# Patient Record
Sex: FEMALE | Race: WHITE | HISPANIC OR LATINO | Employment: OTHER | URBAN - METROPOLITAN AREA
[De-identification: names, ages, dates, MRNs, and addresses within clinical notes are randomized per-mention and may not be internally consistent; named-entity substitution may affect disease eponyms.]

---

## 2017-01-04 ENCOUNTER — ALLSCRIPTS OFFICE VISIT (OUTPATIENT)
Dept: OTHER | Facility: OTHER | Age: 77
End: 2017-01-04

## 2017-01-04 DIAGNOSIS — R27.0 ATAXIA: ICD-10-CM

## 2017-01-04 DIAGNOSIS — E78.2 MIXED HYPERLIPIDEMIA: ICD-10-CM

## 2017-01-04 DIAGNOSIS — G62.0 DRUG-INDUCED POLYNEUROPATHY (HCC): ICD-10-CM

## 2017-01-06 ENCOUNTER — ALLSCRIPTS OFFICE VISIT (OUTPATIENT)
Dept: OTHER | Facility: OTHER | Age: 77
End: 2017-01-06

## 2017-01-11 ENCOUNTER — APPOINTMENT (OUTPATIENT)
Dept: LAB | Facility: CLINIC | Age: 77
End: 2017-01-11
Payer: COMMERCIAL

## 2017-01-11 DIAGNOSIS — R27.0 ATAXIA: ICD-10-CM

## 2017-01-11 DIAGNOSIS — G62.0 DRUG-INDUCED POLYNEUROPATHY (HCC): ICD-10-CM

## 2017-01-11 DIAGNOSIS — E78.2 MIXED HYPERLIPIDEMIA: ICD-10-CM

## 2017-01-11 LAB
ALBUMIN SERPL BCP-MCNC: 3.8 G/DL (ref 3.5–5)
ALP SERPL-CCNC: 70 U/L (ref 46–116)
ALT SERPL W P-5'-P-CCNC: 30 U/L (ref 12–78)
ANION GAP SERPL CALCULATED.3IONS-SCNC: 8 MMOL/L (ref 4–13)
AST SERPL W P-5'-P-CCNC: 18 U/L (ref 5–45)
BASOPHILS # BLD AUTO: 0.02 THOUSANDS/ΜL (ref 0–0.1)
BASOPHILS NFR BLD AUTO: 0 % (ref 0–1)
BILIRUB SERPL-MCNC: 0.57 MG/DL (ref 0.2–1)
BUN SERPL-MCNC: 15 MG/DL (ref 5–25)
CALCIUM SERPL-MCNC: 9.1 MG/DL (ref 8.3–10.1)
CHLORIDE SERPL-SCNC: 102 MMOL/L (ref 100–108)
CO2 SERPL-SCNC: 28 MMOL/L (ref 21–32)
CREAT SERPL-MCNC: 0.7 MG/DL (ref 0.6–1.3)
EOSINOPHIL # BLD AUTO: 0.1 THOUSAND/ΜL (ref 0–0.61)
EOSINOPHIL NFR BLD AUTO: 2 % (ref 0–6)
ERYTHROCYTE [DISTWIDTH] IN BLOOD BY AUTOMATED COUNT: 13.4 % (ref 11.6–15.1)
EST. AVERAGE GLUCOSE BLD GHB EST-MCNC: 114 MG/DL
FOLATE SERPL-MCNC: 19.7 NG/ML (ref 3.1–17.5)
GFR SERPL CREATININE-BSD FRML MDRD: >60 ML/MIN/1.73SQ M
GLUCOSE SERPL-MCNC: 136 MG/DL (ref 65–140)
HBA1C MFR BLD: 5.6 % (ref 4.2–6.3)
HCT VFR BLD AUTO: 40.2 % (ref 34.8–46.1)
HGB BLD-MCNC: 13.8 G/DL (ref 11.5–15.4)
LYMPHOCYTES # BLD AUTO: 1.64 THOUSANDS/ΜL (ref 0.6–4.47)
LYMPHOCYTES NFR BLD AUTO: 35 % (ref 14–44)
MCH RBC QN AUTO: 31.7 PG (ref 26.8–34.3)
MCHC RBC AUTO-ENTMCNC: 34.3 G/DL (ref 31.4–37.4)
MCV RBC AUTO: 92 FL (ref 82–98)
MONOCYTES # BLD AUTO: 0.23 THOUSAND/ΜL (ref 0.17–1.22)
MONOCYTES NFR BLD AUTO: 5 % (ref 4–12)
NEUTROPHILS # BLD AUTO: 2.71 THOUSANDS/ΜL (ref 1.85–7.62)
NEUTS SEG NFR BLD AUTO: 58 % (ref 43–75)
NRBC BLD AUTO-RTO: 0 /100 WBCS
PLATELET # BLD AUTO: 231 THOUSANDS/UL (ref 149–390)
PMV BLD AUTO: 10.6 FL (ref 8.9–12.7)
POTASSIUM SERPL-SCNC: 3.8 MMOL/L (ref 3.5–5.3)
PROT SERPL-MCNC: 7.5 G/DL (ref 6.4–8.2)
RBC # BLD AUTO: 4.35 MILLION/UL (ref 3.81–5.12)
SODIUM SERPL-SCNC: 138 MMOL/L (ref 136–145)
T4 FREE SERPL-MCNC: 1.02 NG/DL (ref 0.76–1.46)
TSH SERPL DL<=0.05 MIU/L-ACNC: 4.93 UIU/ML (ref 0.36–3.74)
VIT B12 SERPL-MCNC: 302 PG/ML (ref 100–900)
WBC # BLD AUTO: 4.74 THOUSAND/UL (ref 4.31–10.16)

## 2017-01-11 PROCEDURE — 83036 HEMOGLOBIN GLYCOSYLATED A1C: CPT

## 2017-01-11 PROCEDURE — 84439 ASSAY OF FREE THYROXINE: CPT

## 2017-01-11 PROCEDURE — 84443 ASSAY THYROID STIM HORMONE: CPT

## 2017-01-11 PROCEDURE — 85025 COMPLETE CBC W/AUTO DIFF WBC: CPT

## 2017-01-11 PROCEDURE — 36415 COLL VENOUS BLD VENIPUNCTURE: CPT

## 2017-01-11 PROCEDURE — 80053 COMPREHEN METABOLIC PANEL: CPT

## 2017-01-11 PROCEDURE — 82746 ASSAY OF FOLIC ACID SERUM: CPT

## 2017-01-11 PROCEDURE — 82607 VITAMIN B-12: CPT

## 2017-02-07 ENCOUNTER — GENERIC CONVERSION - ENCOUNTER (OUTPATIENT)
Dept: OTHER | Facility: OTHER | Age: 77
End: 2017-02-07

## 2017-04-11 ENCOUNTER — ALLSCRIPTS OFFICE VISIT (OUTPATIENT)
Dept: OTHER | Facility: OTHER | Age: 77
End: 2017-04-11

## 2017-05-03 DIAGNOSIS — Z12.31 ENCOUNTER FOR SCREENING MAMMOGRAM FOR MALIGNANT NEOPLASM OF BREAST: ICD-10-CM

## 2017-05-09 ENCOUNTER — GENERIC CONVERSION - ENCOUNTER (OUTPATIENT)
Dept: OTHER | Facility: OTHER | Age: 77
End: 2017-05-09

## 2017-08-09 ENCOUNTER — GENERIC CONVERSION - ENCOUNTER (OUTPATIENT)
Dept: OTHER | Facility: OTHER | Age: 77
End: 2017-08-09

## 2017-08-14 ENCOUNTER — ALLSCRIPTS OFFICE VISIT (OUTPATIENT)
Dept: OTHER | Facility: OTHER | Age: 77
End: 2017-08-14

## 2017-08-14 ENCOUNTER — TRANSCRIBE ORDERS (OUTPATIENT)
Dept: ADMINISTRATIVE | Facility: HOSPITAL | Age: 77
End: 2017-08-14

## 2017-08-14 DIAGNOSIS — C54.1 ENDOMETRIAL CANCER (HCC): Primary | ICD-10-CM

## 2017-08-16 ENCOUNTER — GENERIC CONVERSION - ENCOUNTER (OUTPATIENT)
Dept: OTHER | Facility: OTHER | Age: 77
End: 2017-08-16

## 2017-08-25 ENCOUNTER — GENERIC CONVERSION - ENCOUNTER (OUTPATIENT)
Dept: OTHER | Facility: OTHER | Age: 77
End: 2017-08-25

## 2017-08-30 ENCOUNTER — GENERIC CONVERSION - ENCOUNTER (OUTPATIENT)
Dept: OTHER | Facility: OTHER | Age: 77
End: 2017-08-30

## 2017-11-08 ENCOUNTER — HOSPITAL ENCOUNTER (OUTPATIENT)
Dept: CT IMAGING | Facility: CLINIC | Age: 77
Discharge: HOME | End: 2017-11-08
Payer: COMMERCIAL

## 2017-11-08 DIAGNOSIS — C54.1 MALIGNANT NEOPLASM OF ENDOMETRIUM (HCC): ICD-10-CM

## 2017-11-08 PROCEDURE — 71260 CT THORAX DX C+: CPT

## 2017-11-08 PROCEDURE — 74177 CT ABD & PELVIS W/CONTRAST: CPT

## 2017-11-08 RX ADMIN — IOHEXOL 100 ML: 350 INJECTION, SOLUTION INTRAVENOUS at 09:10

## 2017-11-14 ENCOUNTER — GENERIC CONVERSION - ENCOUNTER (OUTPATIENT)
Dept: OTHER | Facility: OTHER | Age: 77
End: 2017-11-14

## 2017-11-14 DIAGNOSIS — C54.1 MALIGNANT NEOPLASM OF ENDOMETRIUM (HCC): ICD-10-CM

## 2018-01-04 ENCOUNTER — GENERIC CONVERSION - ENCOUNTER (OUTPATIENT)
Dept: INTERNAL MEDICINE CLINIC | Facility: CLINIC | Age: 78
End: 2018-01-04

## 2018-01-08 ENCOUNTER — APPOINTMENT (OUTPATIENT)
Dept: LAB | Facility: CLINIC | Age: 78
End: 2018-01-08
Payer: COMMERCIAL

## 2018-01-08 ENCOUNTER — GENERIC CONVERSION - ENCOUNTER (OUTPATIENT)
Dept: OTHER | Facility: OTHER | Age: 78
End: 2018-01-08

## 2018-01-08 ENCOUNTER — ALLSCRIPTS OFFICE VISIT (OUTPATIENT)
Dept: OTHER | Facility: OTHER | Age: 78
End: 2018-01-08

## 2018-01-08 DIAGNOSIS — R05.9 COUGH: ICD-10-CM

## 2018-01-08 DIAGNOSIS — E78.2 MIXED HYPERLIPIDEMIA: ICD-10-CM

## 2018-01-08 LAB
ALBUMIN SERPL BCP-MCNC: 3.6 G/DL (ref 3.5–5)
ALP SERPL-CCNC: 67 U/L (ref 46–116)
ALT SERPL W P-5'-P-CCNC: 24 U/L (ref 12–78)
ANION GAP SERPL CALCULATED.3IONS-SCNC: 6 MMOL/L (ref 4–13)
AST SERPL W P-5'-P-CCNC: 17 U/L (ref 5–45)
BASOPHILS # BLD AUTO: 0.02 THOUSANDS/ΜL (ref 0–0.1)
BASOPHILS NFR BLD AUTO: 0 % (ref 0–1)
BILIRUB SERPL-MCNC: 0.38 MG/DL (ref 0.2–1)
BUN SERPL-MCNC: 16 MG/DL (ref 5–25)
CALCIUM SERPL-MCNC: 9.5 MG/DL (ref 8.3–10.1)
CHLORIDE SERPL-SCNC: 106 MMOL/L (ref 100–108)
CHOLEST SERPL-MCNC: 189 MG/DL (ref 50–200)
CO2 SERPL-SCNC: 28 MMOL/L (ref 21–32)
CREAT SERPL-MCNC: 0.67 MG/DL (ref 0.6–1.3)
EOSINOPHIL # BLD AUTO: 0.13 THOUSAND/ΜL (ref 0–0.61)
EOSINOPHIL NFR BLD AUTO: 2 % (ref 0–6)
ERYTHROCYTE [DISTWIDTH] IN BLOOD BY AUTOMATED COUNT: 12.6 % (ref 11.6–15.1)
GFR SERPL CREATININE-BSD FRML MDRD: 86 ML/MIN/1.73SQ M
GLUCOSE P FAST SERPL-MCNC: 105 MG/DL (ref 65–99)
HCT VFR BLD AUTO: 40.1 % (ref 34.8–46.1)
HDLC SERPL-MCNC: 58 MG/DL (ref 40–60)
HGB BLD-MCNC: 13.8 G/DL (ref 11.5–15.4)
LDLC SERPL CALC-MCNC: 105 MG/DL (ref 0–100)
LYMPHOCYTES # BLD AUTO: 1.2 THOUSANDS/ΜL (ref 0.6–4.47)
LYMPHOCYTES NFR BLD AUTO: 22 % (ref 14–44)
MCH RBC QN AUTO: 31.9 PG (ref 26.8–34.3)
MCHC RBC AUTO-ENTMCNC: 34.4 G/DL (ref 31.4–37.4)
MCV RBC AUTO: 93 FL (ref 82–98)
MONOCYTES # BLD AUTO: 0.34 THOUSAND/ΜL (ref 0.17–1.22)
MONOCYTES NFR BLD AUTO: 6 % (ref 4–12)
NEUTROPHILS # BLD AUTO: 3.73 THOUSANDS/ΜL (ref 1.85–7.62)
NEUTS SEG NFR BLD AUTO: 70 % (ref 43–75)
NRBC BLD AUTO-RTO: 0 /100 WBCS
PLATELET # BLD AUTO: 237 THOUSANDS/UL (ref 149–390)
PMV BLD AUTO: 10.2 FL (ref 8.9–12.7)
POTASSIUM SERPL-SCNC: 4.8 MMOL/L (ref 3.5–5.3)
PROT SERPL-MCNC: 7.6 G/DL (ref 6.4–8.2)
RBC # BLD AUTO: 4.33 MILLION/UL (ref 3.81–5.12)
SODIUM SERPL-SCNC: 140 MMOL/L (ref 136–145)
TRIGL SERPL-MCNC: 129 MG/DL
WBC # BLD AUTO: 5.44 THOUSAND/UL (ref 4.31–10.16)

## 2018-01-08 PROCEDURE — 80061 LIPID PANEL: CPT

## 2018-01-08 PROCEDURE — 85025 COMPLETE CBC W/AUTO DIFF WBC: CPT

## 2018-01-08 PROCEDURE — 86003 ALLG SPEC IGE CRUDE XTRC EA: CPT

## 2018-01-08 PROCEDURE — 80053 COMPREHEN METABOLIC PANEL: CPT

## 2018-01-08 PROCEDURE — 82785 ASSAY OF IGE: CPT

## 2018-01-08 PROCEDURE — 36415 COLL VENOUS BLD VENIPUNCTURE: CPT

## 2018-01-09 LAB
A ALTERNATA IGE QN: <0.1 KUA/I
A FUMIGATUS IGE QN: <0.1 KUA/I
ALLERGEN COMMENT: ABNORMAL
BERMUDA GRASS IGE QN: <0.1 KUA/I
BOXELDER IGE QN: <0.1 KUA/I
C HERBARUM IGE QN: <0.1 KUA/I
CAT DANDER IGE QN: <0.1 KUA/I
CMN PIGWEED IGE QN: <0.1 KUA/I
COMMON RAGWEED IGE QN: <0.1 KUA/I
COTTONWOOD IGE QN: <0.1 KUA/I
D FARINAE IGE QN: <0.1 KUA/I
D PTERONYSS IGE QN: <0.1 KUA/I
DOG DANDER IGE QN: <0.1 KUA/I
LONDON PLANE IGE QN: <0.1 KUA/I
MOUSE URINE PROT IGE QN: <0.1 KUA/I
MT JUNIPER IGE QN: <0.1 KUA/I
MUGWORT IGE QN: <0.1 KUA/I
P NOTATUM IGE QN: <0.1 KUA/I
ROACH IGE QN: <0.1 KUA/I
SHEEP SORREL IGE QN: <0.1 KUA/I
SILVER BIRCH IGE QN: <0.1 KUA/I
TIMOTHY IGE QN: <0.1 KUA/I
TOTAL IGE SMQN RAST: 336 KU/L (ref 0–113)
WALNUT IGE QN: <0.1 KUA/I
WHITE ASH IGE QN: <0.1 KUA/I
WHITE ELM IGE QN: <0.1 KUA/I
WHITE MULBERRY IGE QN: <0.1 KUA/I
WHITE OAK IGE QN: <0.1 KUA/I

## 2018-01-09 NOTE — PROGRESS NOTES
Assessment   1  Cough (786 2) (R05)   2  Lichen planus (071 3) (L43 9)   3  Mixed hyperlipidemia (272 2) (E78 2)   4  Moderate persistent asthmatic bronchitis with acute exacerbation (493 92) (J45 41)   5  Skin rash (782 1) (R21)    Plan   Cough    · (1) ALLERGY, NORTHEAST PANEL ADULT; Status:Active; Requested XSY:[id number];    · (1) CBC/PLT/DIFF; Status:Active; Requested FLN:12RWC8297;    · (1) COMPREHENSIVE METABOLIC PANEL; Status:Active; Requested YEB:01ZQV0679;    · * XR CHEST PA & LATERAL; Status:Active; Requested [id number];    · * XR SINUSES ROUTINE 3+ VIEWS; Status:Active; Requested YIT:[id number]; Flu vaccine need    · Fluzone High-Dose 0 5 ML Intramuscular Suspension Prefilled Syringe; INJECT 0 5  ML    Intramuscular; To Be Done: 01DDO2976  Health Maintenance    · *VB - Urinary Incontinence Screen (Dx Z13 89 Screen for UI) ; every 1 year; Last 69LOA7623; Next    88RUC5767; Status:Active  Mixed hyperlipidemia    · (1) LIPID PANEL FASTING W DIRECT LDL REFLEX; Status:Active; Requested HXX:94DUX0099;   Screen for colon cancer    · COLONOSCOPY; Status:Active; Requested DGY:21FPL7125;   Skin rash    · 1 - Santino Vicente MD  (Dermatology) Co-Management  *  Status: Hold For - Scheduling  Requested    for: 30GYU1507  Care Summary provided  : Yes    Discussion/Summary   Discussion Summary:    Regarding the issues you brought up today cancer screening requested testing requested  to the skin doctor requested regarding the chronic likens rash on both feet particular regarding your ongoing cough: The most likely diagnosis is chronic sinusitis or cough variant asthma or both  It is possible this is a bacterial infection although not terribly likely  Initial assessment should be blood testing plus x-ray of chest in paranasal sinuses  me in 2 or 3 days to review those test results  visit should be scheduled within a month  Further treatment options to be decided after review of the original examinations  History of Present Illness   HPI: A patient is here for a wellness visit  She follows with [facility] for endometrial carcinoma  However, she brings in a couple of issues patient has an area of chronic lichenification of Both the left and the right foot medially extending from the calcaneus to the midfoot which has been itchy for years   history of a cough with stuffy nose but no dyspnea, fever, chills, or sweats  the patient was seen here on August 2016 with a cough and found to be wheezing  She was given a diagnosis of asthma at that time  She reports to me that the cough never really disappeared but has stayed chronic  However, what is different, is that for 1 week she has had a mucoid nasal discharge mixed with blood  Lichen Planus (Brief): The patient is being seen for a routine clinic follow-up of lichen planus  The patient presents with complaints of gradual onset of constant episodes of mild bilateral foot localized rash  Episodes years ago  Her symptoms are caused by no known event  Hyperlipidemia (Follow-Up): The patient states her hyperlipidemia has been stable since the last visit  She has no comorbid illnesses  She has no significant interval events  Symptoms: The patient is currently asymptomatic  Cough, Chronic: The patient is being seen for a routine clinic follow-up of chronic cough  The patient presents with complaints of intermittent episodes of moderate productive cough, described as barky  Episodes started about 2 years ago  Her symptoms are caused by no known event  The patient describes the cough as barky  Currently, the symptoms occur One times a week  No exacerbating factors are noted  Review of Systems   Complete-Female:      Constitutional: no fever-- and-- no chills  Cardiovascular: no chest pain-- and-- no palpitations  Respiratory: cough, but-- no shortness of breath during exertion  Gastrointestinal: no nausea-- and-- no diarrhea  Musculoskeletal: arthralgias, but-- no joint swelling,-- no myalgias-- and-- no joint stiffness  Integumentary: a rash, but-- as noted in HPI  Neurological: no headache  Psychiatric: no anxiety-- and-- no depression  Hematologic/Lymphatic: no swollen glands,-- no tendency for easy bleeding-- and-- no tendency for easy bruising  Preventive Quality 65 Older:      Preventive Quality 65 and Older: Falls Risk: The patient fell 0 times in the past 12 months  Active Problems   1  Ataxia (781 3) (R27 0)   2  Borderline osteopenia (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) (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 80)   3  Drug-induced peripheral neuropathy (357 6,E980 5) (G62 0)   4  Encounter for antineoplastic chemotherapy (V58 11) (Z51 11)   5  Encounter for screening mammogram for malignant neoplasm of breast (V76 12) (Z12 31)   6  Encounter for special screening examination for genitourinary disorder (V81 6) (Z13 89)   7  Endometrial cancer (182 0) (C54 1)   8  Flu vaccine need (V04 81) (Z23)   9  Increased frequency of urination (788 41) (R35 0)   10  Joint pain, knee (719 46) (M25 569)   11  Mixed hyperlipidemia (272 2) (E78 2)   12  Moderate persistent asthmatic bronchitis with acute exacerbation (493 92) (J45 41)   13  Need for pneumococcal vaccination (V03 82) (Z23)   14  Need for revaccination (V05 9) (Z23)   15  Need for vaccination with 13-polyvalent pneumococcal conjugate vaccine (V03 82) (Z23)   16  Post-menopausal bleeding (627 1) (N95 0)   17  Postoperative visit (V58 49) (Z48 89)   18  Screening for genitourinary condition (V81 6) (Z13 89)   19  Skin rash (782 1) (R21)   20  Urinary frequency (788 41) (R35 0)    Past Medical History   1  History of Benign colon polyp (211 3) (K63 5)   2  History of Cellulitis (682 9) (L03 90)   3  History of Effusion of knee (719 06) (M25 469)   4  History of Encounter for screening colonoscopy (V76 51) (Z12 11)   5  Endometrial cancer (182 0) (C54 1)   6  History of Esophagitis, reflux (530 11) (K21 0)   7  History of Flu vaccine need (V04 81) (Z23)   8  H/O screening mammography (V15 89) (Z92 89)   9  History of Hemorrhoids, internal (455 0) (K64 8)   10  History of chronic fatigue syndrome (V13 89) (Z87 898)   11  History of constipation (V12 79) (Z87 19)   12  History of hematuria (V13 09) (Z87 448)   13  History of pregnancy (V13 29)   14  History of sebaceous gland disease (V13 3) (Z87 2)   15  History of Joint pain (719 40) (M25 50)   16  History of Myalgia and myositis (729 1)   17  History of Need for DTaP vaccine (V06 1) (Z23)   18  History of Painful respiration (786 52) (R07 1)   19  History of Visit for routine gyn exam (V72 31) (Z01 419)    Surgical History   1  History of Appendectomy   2  History of Hysterectomy Robotic-Assisted   3  History of Knee Arthroscopy (Therapeutic)   4  History of Open Treatment Of Fracture Of One Metacarpal Bone   5  History of Salpingo-oophorectomy Bilateral   6  History of Staging Lymphadenectomy   7  History of Tubal Ligation  Surgical History Reviewed: The surgical history was reviewed and updated today  Family History   Mother    1  Family history of   Father    2  Family history of    3  Family history of malignant neoplasm of stomach (V16 0) (Z80 0)  Family History Reviewed: The family history was reviewed and updated today  Social History    · Denied: History of Alcohol use   · Denied: History of drug use   · Housewife or homemaker   · Lives with relatives   · Lives with spouse   · Never a smoker   · Denied: History of Tobacco use   · Travel to [facility]   · Travel to [facility]    Current Meds    1  Fish Oil Concentrate 1000 MG Oral Capsule; TAKE 1 CAPSULE DAILY; Therapy: 72ZBH0501 to Recorded   2  Folic Acid 544 MCG Oral Tablet; TAKE 1 TABLET DAILY AS DIRECTED; Therapy: 51HUR2099 to Recorded   3  Multi-B-Plus TABS; Therapy: 79NKN7448 to Recorded   4  Vitamin B-12 1000 MCG Oral Tablet; TAKE 4 TABLET Daily;      Therapy: 23FJR0242 to Recorded   5  Vitamin D2 2000 UNIT Oral Tablet; Take 1 tablet daily; Therapy: 76HRT6679 to Recorded  Medication List Reviewed: The medication list was reviewed and updated today  Allergies   1  No Known Drug Allergies    Vitals   Vital Signs    Recorded: 10HSL8130 09:48AM   Temperature 98 2 F   Heart Rate 94   Systolic 216   Diastolic 68   Height 5 ft 2 in   Weight 191 lb 6 oz   BMI Calculated 35   BSA Calculated 1 88   O2 Saturation 96     Physical Exam        Constitutional      General appearance: No acute distress, well appearing and well nourished  comfortable,-- overweight-- and-- appearance reflects stated age  Ears, Nose, Mouth, and Throat      Oropharynx: Normal with no erythema, edema, exudate or lesions  Pulmonary      Respiratory effort: No increased work of breathing or signs of respiratory distress  Auscultation of lungs: Clear to auscultation  Cardiovascular      Auscultation of heart: Normal rate and rhythm, normal S1 and S2, without murmurs  Examination of extremities for edema and/or varicosities: Normal        Musculoskeletal      Gait and station: Normal        Neurologic      Reflexes: 2+ and symmetric  Psychiatric      Orientation to person, place, and time: Normal        Mood and affect: Normal           Results/Data   Falls Risk Assessment (Dx Z13 89 Screen for Neurologic Disorder) 26ITF9441 09:46AM User, Arynga      Test Name Result Flag Reference   Falls Risk      No falls in the past year      PHQ-9 Adult Depression Screening 40XBK4472 09:46AM User, Arynga      Test Name Result Flag Reference   PHQ-9 Adult Depression Score 0     Over the last two weeks, how often have you been bothered by any of the following problems?      Little interest or pleasure in doing things: Not at all - 0     Feeling down, depressed, or hopeless: Not at all - 0     Trouble falling or staying asleep, or sleeping too much: Not at all - 0     Feeling tired or having little energy: Not at all - 0     Poor appetite or over eating: Not at all - 0     Feeling bad about yourself - or that you are a failure or have let yourself or your family down: Not at all - 0     Trouble concentrating on things, such as reading the newspaper or watching television: Not at all - 0     Moving or speaking so slowly that other people could have noticed  Or the opposite -  being so fidgety or restless that you have been moving around a lot more than usual: Not at all - 0     Thoughts that you would be better off dead, or of hurting yourself in some way: Not at all - 0   PHQ-9 Adult Depression Screening Negative     PHQ-9 Difficulty Level Not difficult at all     PHQ-9 Severity No Depression        *VB - Urinary Incontinence Screen (Dx Z13 89 Screen for UI) 12MCC1091 09:44AM Melida Russell      Test Name Result Flag Reference   Urinary Incontinence Assessment 14NCD7634  yes          Health Management   Family history of    COLONOSCOPY; every 10 years; Next Due: 26VAN0129; Overdue  History of Renal mass, right   COLONOSCOPY; every 10 years; Next Due: 33ERV5645; Overdue  Health Maintenance   * MAMMO SCREENING BILATERAL W CAD; every 1 year; Last 53DQM0238; Next Due: Q554899; Active  *VB - Urinary Incontinence Screen (Dx Z13 89 Screen for UI); every 1 year; Last 81UGI7537; Next    Due: T7776571; Active  Fluzone Intramuscular Injectable; every 1 year; Last 39Sii6823; Next Due: 50Lye9265;  Overdue    Future Appointments      Date/Time Provider Specialty Site   2018 08:00 AM Angelica Lozano [facility] Gynecological Oncology [facility]     Signatures    Electronically signed by : JEN Cline ; 2018 10:27AM EST                       (Author)

## 2018-01-13 VITALS
DIASTOLIC BLOOD PRESSURE: 76 MMHG | WEIGHT: 197.5 LBS | HEART RATE: 90 BPM | SYSTOLIC BLOOD PRESSURE: 128 MMHG | HEIGHT: 62 IN | BODY MASS INDEX: 36.34 KG/M2

## 2018-01-13 VITALS
DIASTOLIC BLOOD PRESSURE: 74 MMHG | WEIGHT: 198.31 LBS | HEART RATE: 72 BPM | RESPIRATION RATE: 14 BRPM | SYSTOLIC BLOOD PRESSURE: 124 MMHG | HEIGHT: 62 IN | BODY MASS INDEX: 36.49 KG/M2 | TEMPERATURE: 98.1 F

## 2018-01-13 VITALS
WEIGHT: 197.38 LBS | BODY MASS INDEX: 36.32 KG/M2 | HEART RATE: 82 BPM | DIASTOLIC BLOOD PRESSURE: 64 MMHG | SYSTOLIC BLOOD PRESSURE: 110 MMHG | TEMPERATURE: 98 F | HEIGHT: 62 IN | RESPIRATION RATE: 14 BRPM

## 2018-01-14 VITALS
TEMPERATURE: 98 F | BODY MASS INDEX: 36.54 KG/M2 | DIASTOLIC BLOOD PRESSURE: 70 MMHG | HEIGHT: 62 IN | HEART RATE: 68 BPM | WEIGHT: 198.56 LBS | SYSTOLIC BLOOD PRESSURE: 108 MMHG | RESPIRATION RATE: 12 BRPM

## 2018-01-22 VITALS
HEART RATE: 94 BPM | BODY MASS INDEX: 35.22 KG/M2 | DIASTOLIC BLOOD PRESSURE: 68 MMHG | HEIGHT: 62 IN | SYSTOLIC BLOOD PRESSURE: 112 MMHG | WEIGHT: 191.38 LBS | TEMPERATURE: 98.2 F | OXYGEN SATURATION: 96 %

## 2018-01-22 VITALS
HEIGHT: 62 IN | SYSTOLIC BLOOD PRESSURE: 130 MMHG | DIASTOLIC BLOOD PRESSURE: 72 MMHG | WEIGHT: 193.31 LBS | RESPIRATION RATE: 16 BRPM | TEMPERATURE: 98.9 F | BODY MASS INDEX: 35.57 KG/M2 | HEART RATE: 84 BPM

## 2018-01-23 NOTE — PROGRESS NOTES
Assessment    1  Encounter for preventive health examination (V70 0) (Z00 00)    Plan  Cough    · (1) ALLERGY, NORTHEAST PANEL ADULT; Status:Active; Requested LBD:23JXJ2826;    · (1) CBC/PLT/DIFF; Status:Active; Requested MXC:81RUK4200;    · (1) COMPREHENSIVE METABOLIC PANEL; Status:Active; Requested UMF:68UVU8966;    · * XR CHEST PA & LATERAL; Status:Active; Requested RYN:53RHJ6085;    · * XR SINUSES ROUTINE 3+ VIEWS; Status:Active; Requested PPW:88QQI8408; Flu vaccine need    · Fluzone High-Dose 0 5 ML Intramuscular Suspension Prefilled Syringe;  INJECT 0 5  ML Intramuscular; To Be Done: 71SIH2712  Health Maintenance    · *VB - Urinary Incontinence Screen (Dx Z13 89 Screen for UI) ; every 1 year; Last  72IQG3799; Next 21HIE7198; Status:Active  Mixed hyperlipidemia    · (1) LIPID PANEL FASTING W DIRECT LDL REFLEX; Status:Active; Requested  MARIO:69DAN4889;   Screen for colon cancer    · COLONOSCOPY; Status:Active; Requested RNV:68OHY1336;   Skin rash    · 1 - Jules CLARKE, Flaco Agee  (Dermatology) Co-Management  *  Status: Hold For - Scheduling   Requested for: 69DIZ5765  Care Summary provided  : Yes    Discussion/Summary  Impression: Subsequent Annual Wellness Visit, with preventive exam as well as age and risk appropriate counseling completed  Cardiovascular screening and counseling: due for a lipid panel and Dx - V81 2 Screen for CV Disorder  Diabetes screening and counseling: due for blood glucose and Dx - V77 1 Screen for DM  Colorectal cancer screening and counseling: screening is current, due for a colonoscopy (low risk) and Dx - V76 51 Screen for CRC  Breast cancer screening and counseling: screening is current  Cervical cancer screening and counseling: screening not indicated  Osteoporosis screening and counseling: the risks and benefits of screening were discussed  Abdominal aortic aneurysm screening and counseling: screening not indicated and Dx - V81 2 Screen for CV Disorder     Glaucoma screening and counseling: screening is current and Dx - V80 1 Screen for Glaucoma  HIV screening and counseling: screening not indicated  Immunizations: influenza vaccine is due today, the lifetime pneumococcal vaccine has been completed, hepatitis B vaccination series is not indicated at this time due to the patient's low risk of beck the disease, Td vaccination up to date and Tdap vaccination up to date  Advance Directive Planning: not complete, she was encouraged to follow-up with me to discuss her questions and/or decisions  Patient Discussion: plan discussed with the patient, follow-up visit needed in one year  History of Present Illness  Welcome to Medicare and Wellness Visits: The patient is being seen for the subsequent annual wellness visit  Medicare Screening and Risk Factors   Hospitalizations: no previous hospitalizations  Medicare Screening Tests Risk Questions   Drug and Alcohol Use: The patient has never smoked cigarettes and has never used smokeless tobacco  The patient reports never drinking alcohol  She has never used illicit drugs  Diet and Physical Activity: Current diet includes well balanced meals  She exercises 20 times per week  Exercise: walking  Mood Disorder and Cognitive Impairment Screening: PHQ-9 Depression Scale   Over the past 2 weeks, how often have you been bothered by the following problems? 1 ) Little interest or pleasure in doing things? Not at all    2 ) Feeling down, depressed or hopeless? Not at all    3 ) Trouble falling asleep or sleeping too much? Not at all    4 ) Feeling tired or having little energy? Not at all    5 ) Poor appetite or overeating? Not at all    6 ) Feeling bad about yourself, or that you are a failure, or have let yourself or your family down? Not at all    7 ) Trouble concentrating on things, such as reading a newspaper or watching television?  Not at all    8 ) Moving or speaking so slowly that other people could have noticed, or the opposite, moving or speaking faster than usual? Not at all    9 ) Thoughts that you would be off dead or of hurting yourself in some way? Not at all  Functional Ability/Level of Safety: Hearing is normal bilaterally, normal in the right ear and normal in the left ear  She does not use a hearing aid  The patient is currently able to do activities of daily living without limitations, able to participate in social activities without limitations and able to drive without limitations  Injury History: urinary incontinence  Advance Directives: Advance directives: no living will, no durable power of  for health care directives and no advance directives  Co-Managers and Medical Equipment/Suppliers: See Patient Care Team   Reviewed Updated [facility]:   Last Medicare Wellness Visit Information was reviewed, patient interviewed and updates made to the record today  Patient Care Team    Care Team Member Role Specialty Office Number   Nela Velázquez  Obstetrics/Gynecology [phone]   Anand Joe MD Specialist Gynecological Oncology [phone]     Active Problems    1  Ataxia (781 3) (R27 0)   2  Borderline osteopenia (733 90) (M85 80)   3  Drug-induced peripheral neuropathy (357 6,E980 5) (G62 0)   4  Encounter for antineoplastic chemotherapy (V58 11) (Z51 11)   5  Encounter for screening mammogram for malignant neoplasm of breast (V76 12)   (Z12 31)   6  Encounter for special screening examination for genitourinary disorder (V81 6) (Z13 89)   7  Endometrial cancer (182 0) (C54 1)   8  Flu vaccine need (V04 81) (Z23)   9  Increased frequency of urination (788 41) (R35 0)   10  Joint pain, knee (719 46) (M25 569)   11  Mixed hyperlipidemia (272 2) (E78 2)   12  Moderate persistent asthmatic bronchitis with acute exacerbation (493 92) (J45 41)   13  Need for pneumococcal vaccination (V03 82) (Z23)   14  Need for revaccination (V05 9) (Z23)   15   Need for vaccination with 13-polyvalent pneumococcal conjugate vaccine (V03 82) (Z23)   16  Post-menopausal bleeding (627 1) (N95 0)   17  Postoperative visit (V58 49) (Z48 89)   18  Screening for genitourinary condition (V81 6) (Z13 89)   19  Skin rash (782 1) (R21)   20   Urinary frequency (788 41) (R35 0)    Past Medical History    · History of Benign colon polyp (211 3) (K63 5)   · History of Cellulitis (682 9) (L03 90)   · History of Effusion of knee (719 06) (M25 469)   · History of Encounter for screening colonoscopy (V76 51) (Z12 11)   · Endometrial cancer (182 0) (C54 1)   · History of Esophagitis, reflux (530 11) (K21 0)   · History of Flu vaccine need (V04 81) (Z23)   · H/O screening mammography (V15 89) (Z92 89)   · History of Hemorrhoids, internal (455 0) (K64 8)   · History of chronic fatigue syndrome (V13 89) (M00 625)   · History of constipation (V12 79) (Z87 19)   · History of hematuria (V13 09) (Z87 448)   · History of pregnancy (V13 29)   · History of sebaceous gland disease (V13 3) (Z87 2)   · History of Joint pain (719 40) (M25 50)   · History of Myalgia and myositis (729 1)   · History of Need for DTaP vaccine (V06 1) (Z23)   · History of Painful respiration (786 52) (R07 1)   · History of Visit for routine gyn exam (V72 31) (Z01 419)    Surgical History    · History of Appendectomy   · History of Hysterectomy Robotic-Assisted   · History of Knee Arthroscopy (Therapeutic)   · History of Open Treatment Of Fracture Of One Metacarpal Bone   · History of Salpingo-oophorectomy Bilateral   · History of Staging Lymphadenectomy   · History of Tubal Ligation    Family History  Mother    · Family history of   Father    · Family history of    · Family history of malignant neoplasm of stomach (V16 0) (Z80 0)    Social History    · Denied: History of Alcohol use   · Denied: History of drug use   · Housewife or homemaker   · Lives with relatives   · Lives with spouse   · Never a smoker   · Denied: History of Tobacco use   · Travel to [facility]   · Travel to [facility]    Current Meds   1  Fish Oil Concentrate 1000 MG Oral Capsule; TAKE 1 CAPSULE DAILY; Therapy: 05BSP8518 to Recorded   2  Folic Acid 045 MCG Oral Tablet; TAKE 1 TABLET DAILY AS DIRECTED; Therapy: 92JDR4912 to Recorded   3  Multi-B-Plus TABS; Therapy: 44FIK7449 to Recorded   4  Vitamin B-12 1000 MCG Oral Tablet; TAKE 4 TABLET Daily; Therapy: 63GWY1200 to Recorded   5  Vitamin D2 2000 UNIT Oral Tablet; Take 1 tablet daily; Therapy: 27XMY6785 to Recorded    Allergies    1  No Known Drug Allergies    Immunizations   1 2 3 4    Influenza  12-Dec-2013 01-Dec-2014 05-Beto-2016 04-Beto-2017    PCV  05-Beto-2016 10-Feb-2017      Tdap  01-Dec-2014       Varicella  03-Feb-2016        Vitals  Signs    Temperature: 98 2 F  Heart Rate: 94  Systolic: 249  Diastolic: 68  Height: 5 ft 2 in  Weight: 191 lb 6 oz  BMI Calculated: 35  BSA Calculated: 1 88  O2 Saturation: 96    Results/Data  Falls Risk Assessment (Dx Z13 89 Screen for Neurologic Disorder) 07KZF2413 09:46AM User, Mashed Pixels     Test Name Result Flag Reference   Falls Risk      No falls in the past year     PHQ-9 Adult Depression Screening 87SNP9333 09:46AM User, Mashed Pixels     Test Name Result Flag Reference   PHQ-9 Adult Depression Score 0     Over the last two weeks, how often have you been bothered by any of the following problems? Little interest or pleasure in doing things: Not at all - 0  Feeling down, depressed, or hopeless: Not at all - 0  Trouble falling or staying asleep, or sleeping too much: Not at all - 0  Feeling tired or having little energy: Not at all - 0  Poor appetite or over eating: Not at all - 0  Feeling bad about yourself - or that you are a failure or have let yourself or your family down: Not at all - 0  Trouble concentrating on things, such as reading the newspaper or watching television: Not at all - 0  Moving or speaking so slowly that other people could have noticed   Or the opposite -  being so fidgety or restless that you have been moving around a lot more than usual: Not at all - 0  Thoughts that you would be better off dead, or of hurting yourself in some way: Not at all - 0   PHQ-9 Adult Depression Screening Negative     PHQ-9 Difficulty Level Not difficult at all     PHQ-9 Severity No Depression       *VB - Urinary Incontinence Screen (Dx Z13 89 Screen for UI) 70ENF4841 09:44AM Renay Dubin     Test Name Result Flag Reference   Urinary Incontinence Assessment 95YCW6066  yes         Health Management  Family history of    COLONOSCOPY; every 10 years; Next Due: 77QNS7424; Overdue  History of Renal mass, right   COLONOSCOPY; every 10 years; Next Due: 64SAC9224; Overdue  Health Maintenance   * MAMMO SCREENING BILATERAL W CAD; every 1 year; Last 68ZQM6325; Next Due:  Q7498748; Active  *VB - Urinary Incontinence Screen (Dx Z13 89 Screen for UI); every 1 year; Last  40NPD4455; Next Due: X3631484; Active  Fluzone Intramuscular Injectable; every 1 year; Last 27Ydf3504; Next Due: 16Nii0798;   Overdue    Future Appointments    Date/Time Provider Specialty Site   2018 08:00 AM Shereen Byrnes, [address] Gynecological Oncology [facility]     Signatures   Electronically signed by : JEN Venegas ; 2018 10:23AM EST                       (Author)

## 2018-03-07 NOTE — PROGRESS NOTES
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)   3  Drug-induced peripheral neuropathy (357 6,E980 5) (G62 0)   4  Encounter for antineoplastic chemotherapy (V58 11) (Z51 11)   5  Encounter for special screening examination for genitourinary disorder (V81 6) (Z13 89)   6  Endometrial cancer (182 0) (C54 1)   7  Flu vaccine need (V04 81) (Z23)   8  Increased frequency of urination (788 41) (R35 0)   9  Mixed hyperlipidemia (272 2) (E78 2)   10  Moderate persistent asthmatic bronchitis with acute exacerbation (493 92) (J45 41)   11  Need for pneumococcal vaccination (V03 82) (Z23)   12  Need for revaccination (V05 9) (Z23)   13  Need for vaccination with 13-polyvalent pneumococcal conjugate vaccine (V03 82) (Z23)   14  Post-menopausal bleeding (627 1) (N95 0)   15  Postoperative visit (V58 49) (Z48 89)   16  Skin rash (782 1) (R21)   17  Urinary frequency (788 41) (R35 0)    Immunizations  Influenza --- Hermila Yates: [id number] Rasher: 01-Dec-2014; Monty Roy: 05-Jan-2016; Series4:  04-Jan-2017   PCV --- Hermila Yates: 05-Jan-2016   Tdap --- Series1: 01-Dec-2014   Varicella --- Series1: 03-Feb-2016     Current Meds   1  DULoxetine HCl - 30 MG Oral Capsule Delayed Release Particles; TAKE 1 CAPSULE   EVERY DAY   2  Fish Oil Concentrate 1000 MG Oral Capsule; TAKE 1 CAPSULE DAILY   3  Folic Acid 746 MCG Oral Tablet; TAKE 1 TABLET DAILY AS DIRECTED   4  Multi-B-Plus TABS   5  Vitamin B-12 1000 MCG Oral Tablet; TAKE 4 TABLET Daily   6  Vitamin D2 2000 UNIT Oral Tablet; Take 1 tablet daily    Allergies    1   No Known Drug Allergies    Future Appointments    Date/Time Provider Specialty Site 01/08/2018 09:30 AM JEN Farooq   Internal Medicine [facility]   04/06/2017 09:30 AM Alejandra Zuluaga [facility]     Signatures   Electronically signed by : JEN Goncalves ; Feb 13 2017 12:15PM EST

## 2018-05-09 ENCOUNTER — APPOINTMENT (OUTPATIENT)
Dept: RADIOLOGY | Facility: CLINIC | Age: 78
End: 2018-05-09
Payer: COMMERCIAL

## 2018-05-09 ENCOUNTER — OFFICE VISIT (OUTPATIENT)
Dept: OBGYN CLINIC | Facility: CLINIC | Age: 78
End: 2018-05-09
Payer: COMMERCIAL

## 2018-05-09 VITALS
WEIGHT: 192 LBS | BODY MASS INDEX: 35.33 KG/M2 | DIASTOLIC BLOOD PRESSURE: 73 MMHG | HEIGHT: 62 IN | SYSTOLIC BLOOD PRESSURE: 115 MMHG | HEART RATE: 67 BPM

## 2018-05-09 DIAGNOSIS — M79.671 PAIN OF RIGHT HEEL: ICD-10-CM

## 2018-05-09 DIAGNOSIS — M77.31 HEEL SPUR, RIGHT: ICD-10-CM

## 2018-05-09 DIAGNOSIS — M79.671 PAIN OF RIGHT HEEL: Primary | ICD-10-CM

## 2018-05-09 DIAGNOSIS — M72.2 PLANTAR FASCIITIS OF RIGHT FOOT: ICD-10-CM

## 2018-05-09 PROCEDURE — 73650 X-RAY EXAM OF HEEL: CPT

## 2018-05-09 PROCEDURE — 99203 OFFICE O/P NEW LOW 30 MIN: CPT | Performed by: FAMILY MEDICINE

## 2018-05-09 RX ORDER — NAPROXEN 500 MG/1
500 TABLET ORAL 2 TIMES DAILY WITH MEALS
Qty: 30 TABLET | Refills: 0 | Status: SHIPPED | OUTPATIENT
Start: 2018-05-09 | End: 2018-08-27

## 2018-05-09 RX ORDER — EAR PLUGS
4 EACH OTIC (EAR) DAILY
COMMUNITY
Start: 2014-06-02

## 2018-05-09 RX ORDER — PHENOL 1.4 %
AEROSOL, SPRAY (ML) MUCOUS MEMBRANE
COMMUNITY
End: 2018-10-23 | Stop reason: SDUPTHER

## 2018-05-09 RX ORDER — CHOLECALCIFEROL (VITAMIN D3) 125 MCG
1 TABLET ORAL DAILY
COMMUNITY
Start: 2014-06-02 | End: 2019-08-08

## 2018-05-09 RX ORDER — LATANOPROST 50 UG/ML
1 SOLUTION/ DROPS OPHTHALMIC
COMMUNITY
Start: 2016-12-21

## 2018-05-09 NOTE — PROGRESS NOTES
Assessment/Plan:  Assessment/Plan   Diagnoses and all orders for this visit:    Pain of right heel  -     XR heel / calcaneus 2+ vw right; Future  -     naproxen (NAPROSYN) 500 mg tablet; Take 1 tablet (500 mg total) by mouth 2 (two) times a day with meals    Plantar fasciitis of right foot  -     naproxen (NAPROSYN) 500 mg tablet; Take 1 tablet (500 mg total) by mouth 2 (two) times a day with meals    Heel spur, right    Other orders  -     latanoprost (XALATAN) 0 005 % ophthalmic solution; Apply 1 drop to eye  -     Multiple Vitamins-Minerals (MULTI-B-PLUS) TABS; Take by mouth  -     Multiple Vitamins-Minerals (MULTIVITAMIN ADULTS 50+) TABS; Take by mouth  -     Cyanocobalamin (VITAMIN B-12) 1000 MCG/15ML LIQD; Take 4 tablets by mouth daily  -     Ergocalciferol (VITAMIN D2) 2000 units TABS; Take 1 tablet by mouth daily      81-year-old female with right heel pain  Discussed with patient physical exam, radiographs, impression, and plan  X-rays noted for heel spur of the calcaneus  Her physical exam is noted for significant tenderness the plantar calcaneus at the plantar fascia origin, with mild tenderness at the medial and lateral calcaneus, with mild reproduction of pain with calcaneal squeeze  Clinical impression is plantar fasciitis  I recommend treatment regimen of anti-inflammatory, ice wrap, and strapping at night  She will take naproxen 500 mg twice daily with food consistently for 2 weeks  She is to do cold rub with frozen water bottle, and may obtain plantar fasciitis strapping socks from local pharmacy  She will follow up with me in 4 weeks for re-evaluation  Subjective:   Patient ID: Opal Lund is a 78 y o  female  Chief Complaint   Patient presents with    Right Foot - Pain       81-year-old female presents for evaluation of right heel pain of 2-3 months duration  She denies any trauma or inciting event, and onset was sudden    Pain is described as localized to the plantar aspect of right heel, sharp and needle like, radiates to the distal plantar aspect of the foot, worse with bearing weight, and improved with rest   She reports pain is most significant in the morning and when taken initial steps  She has been walking with antalgic gait due to the pain  She reports that bearing weight is more tolerable when walking on soft surfaces such as grass  She had taken 2 Aleve tablets which she states helped with her pain temporarily  The following portions of the patient's history were reviewed and updated as appropriate: She  has no past medical history on file  She  has no past surgical history on file  Her family history includes Cancer in her father and mother  She  has no tobacco, alcohol, and drug history on file  She has No Known Allergies       Review of Systems   Constitutional: Negative for chills and fever  HENT: Negative for sore throat  Eyes: Negative for visual disturbance  Respiratory: Negative for shortness of breath  Cardiovascular: Negative for chest pain  Gastrointestinal: Negative for abdominal pain  Genitourinary: Negative for difficulty urinating  Musculoskeletal: Positive for gait problem (Antalgic) and myalgias  Negative for arthralgias  Skin: Negative for rash and wound  Neurological: Negative for weakness and numbness  Hematological: Does not bruise/bleed easily  Psychiatric/Behavioral: Negative for self-injury  Objective:  Vitals:    05/09/18 0828   BP: 115/73   Pulse: 67   Weight: 87 1 kg (192 lb)   Height: 5' 2" (1 575 m)     Right Ankle Exam     Muscle Strength   The patient has normal right ankle strength  Observations     Right Ankle/Foot   Negative for deformity  Tenderness     Right Ankle/Foot   Tenderness in the medial calcaneus and plantar fascia  No tenderness in the Achilles insertion, anterior ankle, anterior talofibular ligament, fifth metatarsal base, calcaneofibular ligament, dorsum foot and fibula  Additional Tenderness Details  Right - plantar aspect of calcaneus    Active Range of Motion     Right Ankle/Foot   Normal active range of motion    Strength/Myotome Testing     Right Ankle/Foot   Normal strength    Tests     Right Ankle/Foot   Positive for calcaneal squeeze  Negative for metatarsal squeeze  Physical Exam   Constitutional: She is oriented to person, place, and time  She appears well-developed  No distress  HENT:   Head: Normocephalic  Eyes: Conjunctivae are normal    Neck: No tracheal deviation present  Cardiovascular: Normal rate  Pulmonary/Chest: Effort normal  No respiratory distress  Abdominal: She exhibits no distension  Musculoskeletal:        Right ankle: No CF ligament tenderness found  Right foot: There is no deformity  Neurological: She is alert and oriented to person, place, and time  Skin: Skin is warm and dry  Psychiatric: She has a normal mood and affect  Her behavior is normal    Vitals reviewed  I have personally reviewed pertinent films in PACS

## 2018-05-14 ENCOUNTER — OFFICE VISIT (OUTPATIENT)
Dept: GYNECOLOGIC ONCOLOGY | Facility: CLINIC | Age: 78
End: 2018-05-14
Payer: COMMERCIAL

## 2018-05-14 VITALS
HEART RATE: 68 BPM | SYSTOLIC BLOOD PRESSURE: 128 MMHG | WEIGHT: 190 LBS | HEIGHT: 62 IN | DIASTOLIC BLOOD PRESSURE: 76 MMHG | BODY MASS INDEX: 34.96 KG/M2 | RESPIRATION RATE: 16 BRPM

## 2018-05-14 DIAGNOSIS — C54.1 ENDOMETRIAL CANCER (HCC): ICD-10-CM

## 2018-05-14 DIAGNOSIS — Z12.31 ENCOUNTER FOR SCREENING MAMMOGRAM FOR BREAST CANCER: Primary | ICD-10-CM

## 2018-05-14 PROCEDURE — 99214 OFFICE O/P EST MOD 30 MIN: CPT | Performed by: PHYSICIAN ASSISTANT

## 2018-05-14 NOTE — ASSESSMENT & PLAN NOTE
Clinically without evidence of disease recurrence   Return to the office in 6 months for continued cancer surveillance    Script provided for screening mammogram

## 2018-05-14 NOTE — PROGRESS NOTES
Assessment/Plan:    Problem List Items Addressed This Visit        Genitourinary    Endometrial cancer ([facility] [address] )     Clinically without evidence of disease recurrence  Return to the office in 6 months for continued cancer surveillance    Script provided for screening mammogram             Other Visit Diagnoses     Encounter for screening mammogram for breast cancer    -  Primary    Relevant Orders    Mammo screening bilateral w cad            CHIEF COMPLAINT:   Endometrial cancer surveillance    Problem:  Cancer Staging  Endometrial cancer ([facility] [address] )  Staging form: Corpus Uteri - Carcinoma, AJCC 8th Edition  - Clinical: FIGO Stage IB - Signed by Chantal Flynn PA-C on 5/14/2018        Previous therapy:     Endometrial cancer [facility])     Initial Diagnosis     Endometrial cancer ([facility] [address] )         7/6/2015 Surgery     Robotic-assisted total laparoscopic hysterectomy, bilateral salpingo-oophorectomy, pelvic and para-aortic lymph node dissections on   A  1 3 out of 1 8 cm depth of invasion, negative nodes, no LVSI, positive peritoneal cytology          - 11/20/2015 Chemotherapy     Paclitaxel 175 mg/m2 and carboplatin AUC 6  She received six cycles  - 11/2015 Radiation     Vaginal brachytherapy              Patient ID: Franco Bhandari is a 78 y o  female  who has no new complaints today  No vaginal bleeding, abdominal/pelvic pain  Normal bowel and bladder function  No interval change in medical history since last visit  Quality of life is good  The following portions of the patient's history were reviewed and updated as appropriate: allergies, current medications, past medical history, past surgical history and problem list     Review of Systems   Constitutional: Negative  HENT: Negative  Eyes: Negative  Respiratory: Negative  Cardiovascular: Negative  Gastrointestinal: Negative  Genitourinary: Negative  Musculoskeletal: Negative  Skin: Negative  Neurological: Negative  Psychiatric/Behavioral: Negative  Current Outpatient Prescriptions   Medication Sig Dispense Refill    Cyanocobalamin (VITAMIN B-12) 1000 MCG/15ML LIQD Take 4 tablets by mouth daily      Ergocalciferol (VITAMIN D2) 2000 units TABS Take 1 tablet by mouth daily      latanoprost (XALATAN) 0 005 % ophthalmic solution Apply 1 drop to eye      Multiple Vitamins-Minerals (MULTI-B-PLUS) TABS Take by mouth      Multiple Vitamins-Minerals (MULTIVITAMIN ADULTS 50+) TABS Take by mouth      naproxen (NAPROSYN) 500 mg tablet Take 1 tablet (500 mg total) by mouth 2 (two) times a day with meals 30 tablet 0     No current facility-administered medications for this visit  Objective:    Blood pressure 128/76, pulse 68, resp  rate 16, height 5' 2" (1 575 m), weight 86 2 kg (190 lb)  Body mass index is 34 75 kg/m²  Body surface area is 1 87 meters squared  Physical Exam   Constitutional: She is oriented to person, place, and time  She appears well-developed and well-nourished  HENT:   Head: Normocephalic and atraumatic  Neck: Normal range of motion  Neck supple  No thyromegaly present  Pulmonary/Chest: Effort normal    Abdominal: Soft  She exhibits no distension and no mass  There is no rebound  Genitourinary:   Genitourinary Comments: The external female genitalia is normal  The bartholin's, uretheral and skenes glands are normal  The urethral meatus is normal  Speculum exam reveals a grossly normal vagina  No masses, lesions or bleeding  Manual exam notes a surgical absent cervix, uterus and adnexal structures  No masses or fullness  Musculoskeletal: Normal range of motion  She exhibits no edema  Lymphadenopathy:     She has no cervical adenopathy  Neurological: She is alert and oriented to person, place, and time  Skin: Skin is warm and dry  No rash noted  Psychiatric: She has a normal mood and affect  Her behavior is normal    Vitals reviewed

## 2018-06-15 ENCOUNTER — TRANSCRIBE ORDERS (OUTPATIENT)
Dept: ADMINISTRATIVE | Facility: HOSPITAL | Age: 78
End: 2018-06-15

## 2018-06-15 DIAGNOSIS — M81.0 SENILE OSTEOPOROSIS: Primary | ICD-10-CM

## 2018-06-21 ENCOUNTER — HOSPITAL ENCOUNTER (OUTPATIENT)
Dept: BONE DENSITY | Facility: CLINIC | Age: 78
Discharge: HOME | End: 2018-06-21
Payer: COMMERCIAL

## 2018-06-21 DIAGNOSIS — M81.0 SENILE OSTEOPOROSIS: ICD-10-CM

## 2018-06-21 PROCEDURE — 77080 DXA BONE DENSITY AXIAL: CPT

## 2018-06-25 ENCOUNTER — OFFICE VISIT (OUTPATIENT)
Dept: OBGYN CLINIC | Facility: CLINIC | Age: 78
End: 2018-06-25
Payer: COMMERCIAL

## 2018-06-25 VITALS
WEIGHT: 187 LBS | BODY MASS INDEX: 34.41 KG/M2 | SYSTOLIC BLOOD PRESSURE: 112 MMHG | DIASTOLIC BLOOD PRESSURE: 72 MMHG | HEART RATE: 56 BPM | HEIGHT: 62 IN

## 2018-06-25 DIAGNOSIS — M72.2 PLANTAR FASCIITIS OF RIGHT FOOT: Primary | ICD-10-CM

## 2018-06-25 DIAGNOSIS — M79.671 PAIN IN RIGHT FOOT: ICD-10-CM

## 2018-06-25 DIAGNOSIS — M77.31 HEEL SPUR, RIGHT: ICD-10-CM

## 2018-06-25 PROCEDURE — 99213 OFFICE O/P EST LOW 20 MIN: CPT | Performed by: FAMILY MEDICINE

## 2018-06-25 RX ORDER — NAPROXEN 500 MG/1
500 TABLET ORAL 2 TIMES DAILY WITH MEALS
Qty: 30 TABLET | Refills: 0 | Status: SHIPPED | OUTPATIENT
Start: 2018-06-25 | End: 2018-09-24

## 2018-06-25 NOTE — PATIENT INSTRUCTIONS
Knee Exercises   WHAT YOU NEED TO KNOW:   What do I need to know about knee exercises? Knee exercises help strengthen the muscles around your knee  Strong muscles can help reduce pain and decrease your risk of future injury  Knee exercises also help you heal after an injury or surgery  · Start slow  These are beginning exercises  Ask your healthcare provider if you need to see a physical therapist for more advanced exercises  As you get stronger, you may be able to do more sets of each exercise or add weights  · Stop if you feel pain  It is normal to feel some discomfort at first  Regular exercise will help decrease your discomfort over time  · Do the exercises on both legs  Do this so both knees remain strong  · Warm up before you do knee exercises  Walk or ride a stationary bike for 5 or 10 minutes to warm your muscles  How do I perform knee stretches safely? Always stretch before you do strengthening exercises  Do these stretching exercises again after you do the strengthening exercises  Do these stretches 4 or 5 days a week, or as directed  · Standing calf stretch: Face a wall and place both palms flat on the wall, or hold the back of a chair for balance  Keep a slight bend in your knees  Take a big step backward with one leg  Keep your other leg directly under you  Keep both heels flat and press your hips forward  Hold the stretch for 30 seconds, and then relax for 30 seconds  Switch legs  Repeat 2 or 3 times on each leg  · Standing quadriceps stretch:  Stand and place one hand against a wall or hold the back of a chair for balance  With your weight on one leg, bend your other leg and grab your ankle  Bring your heel toward your buttocks  Hold the stretch for 30 to 60 seconds  Switch legs  Repeat 2 or 3 times on each leg  · Sitting hamstring stretch:  Sit with both legs straight in front of you  Do not point or flex your toes   Place your palms on the floor and slide your hands forward until you feel the stretch  Do not round your back  Hold the stretch for 30 seconds  Repeat 2 or 3 times  How do I perform knee strengthening exercises safely? Do these exercises 4 or 5 days a week, or as directed  · Standing half squats:  Stand with your feet shoulder-width apart  Lean your back against a wall or hold the back of a chair for balance, if needed  Slowly sit down about 10 inches, as if you are going to sit in a chair  Your body weight should be mostly over your heels  Hold the squat for 5 seconds, then rise to a standing position  Do 3 sets of 10 squats to strengthen your buttocks and thighs  · Standing hamstring curls: Face a wall and place both palms flat on the wall, or hold the back of a chair for balance  With your weight on one leg, lift your other foot as close to your buttocks as you can  Hold for 5 seconds and then lower your leg  Do 2 sets of 10 curls on each leg  This exercise strengthens the muscles in the back of your thigh  · Standing calf raises:  Face a wall and place both palms flat on the wall, or hold the back of a chair for balance  Stand up straight, and do not lean  Place all your weight on one leg by lifting the other foot off the floor  Raise the heel of the foot that is on the floor as high as you can and then lower it  Do 2 sets of 10 calf raises on each leg to strengthen your calf muscles  · Straight leg lifts:  Lie on your stomach with straight legs  Fold your arms in front of you and rest your head in your arms  Tighten your leg muscles and raise one leg as high as you can  Hold for 5 seconds, then lower your leg  Do 2 sets of 10 lifts on each leg to strengthen your buttocks  · Sitting leg lifts:  Sit in a chair  Slowly straighten and raise one leg  Squeeze your thigh muscles and hold for 5 seconds  Relax and return your foot to the floor  Do 2 sets of 10 lifts on each leg   This helps strengthen the muscles in the front of your thigh  When should I contact my healthcare provider? · You have new pain or your pain becomes worse  · You have questions or concerns about your condition or care  CARE AGREEMENT:   You have the right to help plan your care  Learn about your health condition and how it may be treated  Discuss treatment options with your caregivers to decide what care you want to receive  You always have the right to refuse treatment  The above information is an  only  It is not intended as medical advice for individual conditions or treatments  Talk to your doctor, nurse or pharmacist before following any medical regimen to see if it is safe and effective for you  © 2017 [facility] Information is for End User's use only and may not be sold, redistributed or otherwise used for commercial purposes  All illustrations and images included in CareNotes® are the copyrighted property of [employer]  or [address]

## 2018-06-25 NOTE — PROGRESS NOTES
Assessment/Plan:  Assessment/Plan   Diagnoses and all orders for this visit:    Plantar fasciitis of right foot  -     naproxen (NAPROSYN) 500 mg tablet; Take 1 tablet (500 mg total) by mouth 2 (two) times a day with meals  -     Ambulatory referral to Physical Therapy; Future    Pain in right foot  -     naproxen (NAPROSYN) 500 mg tablet; Take 1 tablet (500 mg total) by mouth 2 (two) times a day with meals  -     Ambulatory referral to Physical Therapy; Future    Heel spur, right          46-year-old female with right foot pain  Discussed with patient physical exam, impression, and plan  Her symptoms and physical exam have improved since previous visit  She is noted to have moderate tenderness at plantar fascia origin and pain at the medial calcaneus  I will have her take another course naproxen 500 mg twice daily with food for 2 weeks, and I will refer her to physical therapy which she is to start as soon as possible and do home exercises directed  She is also to continue with icing and strapping  She will follow up with me in 2 months at which point she will be re-evaluated  Subjective:   Patient ID: Chevy Goel is a 78 y o  female  Chief Complaint   Patient presents with    Right Foot - Follow-up       46-year-old female following up for right foot pain  She was last seen 6 weeks ago which point x-ray evaluation was noted for calcaneal spur and clinical impression was plantar fasciitis  She was prescribed course of naproxen and advised on wearing plantar fasciitis socks, as well as stretching and cold rub  Today she reports improvement in her right foot pain since her previous visit  She had taking course naproxen as directed, did strapping of her foot at night and also does it while she is sitting watching TV   She states that pain is described as localized plantar aspects of foot, sharp and burning, nonradiating, worse when take initial steps in the morning, and improves after stretching and taking a few steps  She reports that her gait has improved stating she can bear more weight when ambulating, as opposed to before when she walked with antalgic gait on her toes  Review of Systems   Musculoskeletal: Positive for myalgias  Negative for arthralgias and gait problem  Neurological: Negative for weakness and numbness  Objective:  Vitals:    06/25/18 0829   BP: 112/72   BP Location: Right arm   Patient Position: Sitting   Cuff Size: Standard   Pulse: 56   Weight: 84 8 kg (187 lb)   Height: 5' 2" (1 575 m)     Right Ankle Exam     Muscle Strength   The patient has normal right ankle strength  Observations     Right Ankle/Foot   Negative for deformity  Tenderness     Right Ankle/Foot   Tenderness in the medial calcaneus and plantar fascia  No tenderness in the Achilles insertion, anterior ankle, dorsum foot, medial malleolus and mid-plantar aspect  Additional Tenderness Details   Right  -plantar fascia origin    Active Range of Motion     Right Ankle/Foot   Normal active range of motion    Strength/Myotome Testing     Right Ankle/Foot   Normal strength    Tests     Right Ankle/Foot   Negative for calcaneal squeeze and metatarsal squeeze  Physical Exam   Constitutional: She is oriented to person, place, and time  She appears well-developed  No distress  HENT:   Head: Normocephalic  Eyes: Conjunctivae are normal    Neck: No tracheal deviation present  Cardiovascular: Normal rate  Pulmonary/Chest: Effort normal  No respiratory distress  Abdominal: She exhibits no distension  Musculoskeletal:        Right ankle: No medial malleolus tenderness found  Right foot: There is no deformity  Neurological: She is alert and oriented to person, place, and time  Skin: Skin is warm and dry  Psychiatric: She has a normal mood and affect  Her behavior is normal    Nursing note and vitals reviewed

## 2018-08-27 ENCOUNTER — OFFICE VISIT (OUTPATIENT)
Dept: OBGYN CLINIC | Facility: CLINIC | Age: 78
End: 2018-08-27
Payer: COMMERCIAL

## 2018-08-27 VITALS
DIASTOLIC BLOOD PRESSURE: 71 MMHG | HEART RATE: 54 BPM | HEIGHT: 61 IN | BODY MASS INDEX: 35.3 KG/M2 | SYSTOLIC BLOOD PRESSURE: 114 MMHG | WEIGHT: 187 LBS

## 2018-08-27 DIAGNOSIS — M72.2 PLANTAR FASCIITIS OF RIGHT FOOT: Primary | ICD-10-CM

## 2018-08-27 DIAGNOSIS — M79.671 PAIN IN RIGHT FOOT: ICD-10-CM

## 2018-08-27 PROCEDURE — 99213 OFFICE O/P EST LOW 20 MIN: CPT | Performed by: FAMILY MEDICINE

## 2018-08-27 NOTE — PROGRESS NOTES
Assessment/Plan:  Assessment/Plan   Diagnoses and all orders for this visit:    Plantar fasciitis of right foot    Pain in right foot          29-year-old female with improved right foot plantar fasciitis  Discussed with patient physical exam, impression, and plan  Her physical exam is noted for mild tenderness medial aspect of the calcaneus and plantar fascia insertion, without reproduction of pain with forced ankle dorsiflexion  I recommend she continue with her home exercise program, strapping at night, and NSAIDs as needed  She need only follow up with me on an as-needed basis in this regard  Subjective:   Patient ID: Matthew Collado is a 78 y o  female  Chief Complaint   Patient presents with    Right Foot - Follow-up       29-year-old female following up for right foot pain 5 months duration  She was last seen 2 months ago at which point she was noted to have improvement in her right foot plantar fasciitis  She was prescribed course of naproxen, encouraged to continue with strapping at night, and referred to physical therapy  Today she reports significant improvement in pain of right foot  She reports only mild amount of pain when taking initial steps in the morning, but ambulate throughout the day without issue  She reports that she was away on vacation and did extensive walking without any pain  Pain in the morning is described as localized to the bottom of the heel, achy and sharp, mild in intensity, nonradiating, and improves after few steps and with stretching  She has been doing home exercises, wearing plantar fasciitis socks at night, and taking naproxen as needed and states this has helped significantly with her symptoms  Review of Systems   Musculoskeletal: Positive for arthralgias  Negative for joint swelling  Neurological: Negative for weakness and numbness         Objective:  Vitals:    08/27/18 0758   BP: 114/71   Pulse: (!) 54   Weight: 84 8 kg (187 lb)   Height: 5' 1" (1 549 m)     Right Ankle Exam     Muscle Strength   The patient has normal right ankle strength  Observations     Right Ankle/Foot   Negative for deformity  Tenderness     Right Ankle/Foot   Tenderness in the medial calcaneus and plantar fascia  No tenderness in the Achilles insertion, anterior ankle, anterior talofibular ligament, fifth metatarsal base, calcaneofibular ligament, deltoid ligament, dorsum foot, lateral malleolus, medial malleolus, peroneal tendon and posterior talofibular ligament  Active Range of Motion     Right Ankle/Foot   Normal active range of motion    Strength/Myotome Testing     Right Ankle/Foot   Normal strength    Tests     Right Ankle/Foot   Positive for calcaneal squeeze  Physical Exam   Constitutional: She is oriented to person, place, and time  She appears well-developed  No distress  HENT:   Head: Normocephalic  Eyes: Conjunctivae are normal    Neck: No tracheal deviation present  Pulmonary/Chest: Effort normal  No respiratory distress  Abdominal: She exhibits no distension  Musculoskeletal:        Right ankle: No lateral malleolus, no medial malleolus, no CF ligament and no posterior TFL tenderness found  Right foot: There is no deformity  Neurological: She is alert and oriented to person, place, and time  Skin: Skin is warm and dry  Psychiatric: She has a normal mood and affect  Her behavior is normal    Nursing note and vitals reviewed

## 2018-09-24 ENCOUNTER — OFFICE VISIT (OUTPATIENT)
Dept: OBGYN CLINIC | Facility: CLINIC | Age: 78
End: 2018-09-24
Payer: COMMERCIAL

## 2018-09-24 VITALS
BODY MASS INDEX: 34.23 KG/M2 | HEART RATE: 69 BPM | HEIGHT: 62 IN | WEIGHT: 186 LBS | DIASTOLIC BLOOD PRESSURE: 78 MMHG | SYSTOLIC BLOOD PRESSURE: 122 MMHG

## 2018-09-24 DIAGNOSIS — M53.3 SACROILIAC JOINT DYSFUNCTION OF RIGHT SIDE: ICD-10-CM

## 2018-09-24 DIAGNOSIS — M54.50 ACUTE RIGHT-SIDED LOW BACK PAIN WITHOUT SCIATICA: Primary | ICD-10-CM

## 2018-09-24 DIAGNOSIS — M16.0 BILATERAL HIP JOINT ARTHRITIS: ICD-10-CM

## 2018-09-24 DIAGNOSIS — R29.898 WEAKNESS OF BOTH HIPS: ICD-10-CM

## 2018-09-24 DIAGNOSIS — M62.830 LUMBAR PARASPINAL MUSCLE SPASM: ICD-10-CM

## 2018-09-24 DIAGNOSIS — M51.36 DEGENERATIVE DISC DISEASE, LUMBAR: ICD-10-CM

## 2018-09-24 PROCEDURE — 99214 OFFICE O/P EST MOD 30 MIN: CPT | Performed by: FAMILY MEDICINE

## 2018-09-24 RX ORDER — MELOXICAM 15 MG/1
15 TABLET ORAL DAILY
Qty: 30 TABLET | Refills: 1 | Status: SHIPPED | OUTPATIENT
Start: 2018-09-24 | End: 2019-03-01

## 2018-09-24 NOTE — PROGRESS NOTES
Assessment/Plan:  Assessment/Plan   Diagnoses and all orders for this visit:    Acute right-sided low back pain without sciatica  -     Ambulatory referral to Physical Therapy; Future    Degenerative disc disease, lumbar  -     Ambulatory referral to Physical Therapy; Future  -     meloxicam (MOBIC) 15 mg tablet; Take 1 tablet (15 mg total) by mouth daily    Lumbar paraspinal muscle spasm  -     Ambulatory referral to Physical Therapy; Future    Sacroiliac joint dysfunction of right side  -     Ambulatory referral to Physical Therapy; Future    Bilateral hip joint arthritis  -     Ambulatory referral to Physical Therapy; Future  -     meloxicam (MOBIC) 15 mg tablet; Take 1 tablet (15 mg total) by mouth daily    Weakness of both hips  -     Ambulatory referral to Physical Therapy; Future        79-year-old female with low back pain following multiple fall injuries within past 2 weeks  Discussed with patient physical exam, previous imaging, impression, and plan  Review of CT scan of chest abdomen and pelvis from November 2017 is noted for multilevel degenerative disc disease of lumbar spine  Her physical exam is unremarkable for midline lumbar tenderness but noted for moderate right-sided paraspinal and sacroiliac joint tenderness, with limited range of motion in right and left rotation and right and left side bending  Lower extremity examination is intact for strength and reflexes, but noted for decreased sensation to light touch the soles of both feet  I discussed treatment regimen of oral anti-inflammatory, tumeric supplement once daily, and physical therapy to which she agreed  She is to take meloxicam 15 mg once daily with food, and start physical therapy as soon as possible and do home exercises as directed  She will follow up with me in 6 weeks at which point she will be re-evaluated  If pain worsens she may return sooner  Subjective:   Patient ID: Genevieve Boudreaux is a 78 y o  female    Chief Complaint Patient presents with    Lower Back - Pain    Right Hip - Pain       51-year-old female presents for evaluation of her right low back and right hip pain following multiple fall injuries  She reports about 10 days ago while walking on a sidewalk she fell landing on her right buttock  She immediately had pain that was described as localized to the right low back and buttock, constant, achy, nonradiating, worse with standing and ambulation  She reports history of neuropathy causing decreased sensation light touch the soles of both feet, following chemotherapy for treatment of endometrial cancer 2 years ago  She denies any worsening or change in her numbness/tingling  She also reports having fallen few more times since then  Hip Pain   This is a new problem  The current episode started 1 to 4 weeks ago  The problem occurs constantly  The problem has been unchanged  Associated symptoms include numbness  Pertinent negatives include no weakness  The symptoms are aggravated by twisting, walking and standing  She has tried rest for the symptoms  The treatment provided mild relief  Review of Systems   Neurological: Positive for numbness  Negative for weakness  Objective:  Vitals:    09/24/18 0919   BP: 122/78   Pulse: 69   Weight: 84 4 kg (186 lb)   Height: 5' 2" (1 575 m)     Right Hip Exam     Muscle Strength   Abduction: 4/5   Flexion: 5/5     Tests   FOX: negative    Comments:  Negative FADDIR      Left Hip Exam     Muscle Strength   Abduction: 4/5   Flexion: 5/5     Tests   FOX: negative    Comments:  Negative FADDIR      Back Exam     Tenderness   The patient is experiencing tenderness in the sacroiliac and lumbar (Right lumbar paraspinal tenderness L1-L5, right sacroiliac joint tenderness)      Range of Motion   Extension: normal   Flexion: normal   Lateral Bend Right: abnormal   Lateral Bend Left: abnormal   Rotation Right: abnormal   Rotation Left: abnormal     Muscle Strength Right Quadriceps:  5/5   Left Quadriceps:  5/5     Tests   Straight leg raise right: negative  Straight leg raise left: negative    Reflexes   Patellar: 2/4    Other   Sensation: decreased    Comments:  Decreased sensation to light touch soles of both feet            Physical Exam   Constitutional: She is oriented to person, place, and time  She appears well-developed  No distress  HENT:   Head: Normocephalic  Eyes: Conjunctivae are normal    Neck: No tracheal deviation present  Cardiovascular: Normal rate  Pulmonary/Chest: Effort normal  No respiratory distress  Abdominal: She exhibits no distension  Musculoskeletal: She exhibits tenderness  She exhibits no edema or deformity  Decreased range of motion of lumbar spine   Neurological: She is alert and oriented to person, place, and time  She displays normal reflexes  She exhibits normal muscle tone  Skin: Skin is warm and dry  No rash noted  No pallor  Psychiatric: She has a normal mood and affect  Her behavior is normal  Judgment and thought content normal    Nursing note and vitals reviewed  I have personally reviewed pertinent films in PACS and my interpretation is Lumbar spine multilevel degenerative disc disease, and arthritis of both hips

## 2018-10-10 ENCOUNTER — EVALUATION (OUTPATIENT)
Dept: PHYSICAL THERAPY | Facility: CLINIC | Age: 78
End: 2018-10-10
Payer: COMMERCIAL

## 2018-10-10 DIAGNOSIS — M62.830 LUMBAR PARASPINAL MUSCLE SPASM: ICD-10-CM

## 2018-10-10 DIAGNOSIS — M53.3 SACROILIAC JOINT DYSFUNCTION OF RIGHT SIDE: ICD-10-CM

## 2018-10-10 DIAGNOSIS — M51.36 DEGENERATIVE DISC DISEASE, LUMBAR: ICD-10-CM

## 2018-10-10 DIAGNOSIS — M54.50 ACUTE RIGHT-SIDED LOW BACK PAIN WITHOUT SCIATICA: Primary | ICD-10-CM

## 2018-10-10 DIAGNOSIS — R29.898 WEAKNESS OF BOTH HIPS: ICD-10-CM

## 2018-10-10 DIAGNOSIS — M16.0 BILATERAL HIP JOINT ARTHRITIS: ICD-10-CM

## 2018-10-10 PROCEDURE — G8990 OTHER PT/OT CURRENT STATUS: HCPCS | Performed by: PHYSICAL THERAPIST

## 2018-10-10 PROCEDURE — 97161 PT EVAL LOW COMPLEX 20 MIN: CPT | Performed by: PHYSICAL THERAPIST

## 2018-10-10 PROCEDURE — G8991 OTHER PT/OT GOAL STATUS: HCPCS | Performed by: PHYSICAL THERAPIST

## 2018-10-10 NOTE — PROGRESS NOTES
PT Evaluation     Today's date: 10/10/2018  Patient name: Jonathan Glynn  : 1940  MRN: [id number]  Referring provider: Sinai Richter DO  Dx:   Encounter Diagnosis     ICD-10-CM    1  Acute right-sided low back pain without sciatica M54 5 Ambulatory referral to Physical Therapy   2  Degenerative disc disease, lumbar M51 36 Ambulatory referral to Physical Therapy   3  Lumbar paraspinal muscle spasm M62 830 Ambulatory referral to Physical Therapy   4  Sacroiliac joint dysfunction of right side M53 3 Ambulatory referral to Physical Therapy   5  Bilateral hip joint arthritis M16 0 Ambulatory referral to Physical Therapy   6  Weakness of both hips R29 898 Ambulatory referral to Physical Therapy                  Assessment  Impairments: abnormal or restricted ROM, activity intolerance, impaired physical strength, lacks appropriate home exercise program, pain with function and safety issue    Assessment details: Patient was provided a home exercise program and demonstrated an understanding of exercises  Patient was advised to stop performing home exercise program if symptoms increase or new complaints developed  Verbal understanding demonstrated regarding home exercise program instructions  Patient would benefit from skilled physical therapy services for prescribed exercises, manual interventions, neuromuscular re-education, education, and modalities as deemed appropriate to assist patient in achieving their maximum level of function  Understanding of Dx/Px/POC: good   Prognosis: good    Goals  STG  1  Decrease pain by at least two subjective ratings in 4 weeks  2  Increase trunk AROM to wfl - no pain end range 4 weeks  3  Improve motor function  4 weeks    LTG  1  Independent with HEP  2  Patient will report improvements with both functional and recreational abilities 6 weeks  3    Patient will report reduced pain > 50% 6-8 weeks      Plan  Patient would benefit from: skilled PT  Planned modality interventions: thermotherapy: hydrocollator packs  Planned therapy interventions: flexibility, graded exercise, home exercise program, therapeutic exercise, strengthening, stretching, patient education, neuromuscular re-education, manual therapy, joint mobilization and IADL retraining  Frequency: 2x week  Duration in weeks: 4  Treatment plan discussed with: patient  Plan details: Patient response to treatment will be monitored each session and progressed accordingly    Thank you for this referral          Subjective Evaluation    History of Present Illness  Mechanism of injury: Patient reports that she initially went to Dr Geoff Martines for right heel pain  This pain subsided but initially caused her to limp "quite a bit"  She has had left knee pain x many years -  She notes that all the limping caused her knee pain to flare and subsequently aggravated her right hip and low back  She states that she was given medication which was helpful  She notes that she is unable to do many chores but finds herself falling "quite a bit "  She notes that she has history of eye issues including occular stroke which has left her with poor eyesight on right  She denies dizziness, but feels that she is very wobbly  She was diagnosed with endometrial cancer 2 years ago which required RAYMON - she notes that all follow up scans have been (-) to date  One MRI - did include the hip which showed arthritis  Today- patient reports that her primary c/o is inability to sleep well  She cannot right s/l, doesn't sleep well supine and can only tolerate left s/l for short periods of time  Her pain is located right buttock region  She has had no injections to date  She recently started with tumeric pills which has "helped a lot " to allow her to move easier     Pain  Current pain ratin  At best pain ratin  At worst pain ratin  Location: Right lateral buttock  - she recently notes that her posterior calf has had severe pain    Quality: sharp  Aggravating factors: sitting and walking  Progression: improved    Social Support  Lives in: multiple-level home  Lives with: alone    Hand dominance: right    Patient Goals  Patient goals for therapy: decreased pain, increased motion and independence with ADLs/IADLs  Patient goal: "I want to walk normal, I don't want to be wobbly"        Objective     Special Questions  Positive for disturbed sleep  Negative for night pain, bladder dysfunction, bowel dysfunction and saddle (S4) numbness    Additional Special Questions  Patient reports disturbed sleep secondary to being uncomfortable and not finding any position that works for her  Postural Observations  Seated posture: fair  Standing posture: fair  Correction of posture: has no consistent effect    Additional Postural Observation Details  Patient tends to demonstrate slight rounding with fhp, shoulders ir/ protracted slightly, flattened lordosis  Palpation   Left   No palpable tenderness to the lumbar paraspinals  Right   No palpable tenderness to the lumbar paraspinals  Additional Palpation Details  (-) tenderness piriformis musculature on left    Tenderness     Lumbar Spine  No tenderness in the spinous process  Left Hip   No tenderness in the PSIS  Right Hip   No tenderness in the PSIS       Neurological Testing     Sensation     Lumbar   Left   Intact: light touch    Right   Intact: light touch    Active Range of Motion     Additional Active Range of Motion Details  fis  - min loss  RFIS - NE  eis - Mod loss  RFIL - NE    Prone lying - NE     Strength/Myotome Testing     Left Hip   Planes of Motion   Flexion: 4-  Abduction: 4-  Adduction: 4  Internal rotation: 4-    Right Hip   Planes of Motion   Flexion: 4-  Abduction: 4-  Adduction: 4  External rotation: 4-  Internal rotation: 4-    Left Knee   Flexion: 4+  Extension: 4    Right Knee   Flexion: 4+  Extension: 4+    Left Ankle/Foot   Dorsiflexion: 5    Right Ankle/Foot   Dorsiflexion: 5    Tests       Thoracic   Negative slump  Lumbar   Negative slump  Left   Negative passive SLR  Right   Negative passive SLR  General Comments     Lumbar Comments  Gait - patient occasionally wobbly - demonstrating extra steps to maintain straight walking     No assistive device ( although she does have a SPC that she will use on occasion)    BIODEX -   EO, firm = 1 35  EC, firm = 2 11  EO, foam = 1 31  EC, foam = 8 21 (+) FALL  Composite = 3 24          Precautions: LBP, Right hip pain - hx left knee pain (arthritis)    Daily Treatment Diary     Manual                                                                                   Exercise Diary  10/10            Aktc/ dktc 5/10s x 10 ea            Ppt / ltr  3/5s x 20/10            bridging 3s x 20            Slr/ saq                          Supine dls s/a/l                          Standing hr             Squats @ bar             slr stand x 3, hs curls                          biodex  LOS             Sidestep w/ tb                                                                                                            Modalities

## 2018-10-15 ENCOUNTER — TELEPHONE (OUTPATIENT)
Dept: INTERNAL MEDICINE CLINIC | Facility: CLINIC | Age: 78
End: 2018-10-15

## 2018-10-15 NOTE — TELEPHONE ENCOUNTER
Patient has a appointment on Tuesday 23 would like to have lab work done before   Chas Avilez Please call when order is in the system    [phone]  Chas Avilez

## 2018-10-16 ENCOUNTER — OFFICE VISIT (OUTPATIENT)
Dept: PHYSICAL THERAPY | Facility: CLINIC | Age: 78
End: 2018-10-16
Payer: COMMERCIAL

## 2018-10-16 DIAGNOSIS — M16.0 BILATERAL HIP JOINT ARTHRITIS: ICD-10-CM

## 2018-10-16 DIAGNOSIS — M54.50 ACUTE RIGHT-SIDED LOW BACK PAIN WITHOUT SCIATICA: Primary | ICD-10-CM

## 2018-10-16 DIAGNOSIS — M51.36 DEGENERATIVE DISC DISEASE, LUMBAR: ICD-10-CM

## 2018-10-16 DIAGNOSIS — M53.3 SACROILIAC JOINT DYSFUNCTION OF RIGHT SIDE: ICD-10-CM

## 2018-10-16 DIAGNOSIS — M62.830 LUMBAR PARASPINAL MUSCLE SPASM: ICD-10-CM

## 2018-10-16 DIAGNOSIS — R29.898 WEAKNESS OF BOTH HIPS: ICD-10-CM

## 2018-10-16 PROCEDURE — 97110 THERAPEUTIC EXERCISES: CPT

## 2018-10-16 PROCEDURE — 97112 NEUROMUSCULAR REEDUCATION: CPT

## 2018-10-16 NOTE — PROGRESS NOTES
Daily Note     Today's date: 10/16/2018  Patient name: Rick Gordon  : 1940  MRN: [id number]  Referring provider: Radha Casillas DO  Dx:   Encounter Diagnosis     ICD-10-CM    1  Acute right-sided low back pain without sciatica M54 5    2  Degenerative disc disease, lumbar M51 36    3  Lumbar paraspinal muscle spasm M62 830    4  Sacroiliac joint dysfunction of right side M53 3    5  Bilateral hip joint arthritis M16 0    6  Weakness of both hips R29 898                   Subjective:  Upon presentation, SPR=0/10  Patient stated she is now not experiencing pain with sit to stand transfers  Objective: See treatment diary below     Manual                                                                                                                                                      Exercise Diary  10/10 10/16                   Aktc/ dktc 5/10s x 10 ea :05/:10  10x each                   Ppt / ltr  3/5s x 20/10 :05  20x each                   bridging 3s x 20 20x                    B:  SLR/ SAQ   10x2 each                                           Supine dls s/a/l   27I                                           Standing hr   10x2                   Squats @ bar                       slr stand x 3, hs curls   10x each                                           biodex  LOS                       Sidestep w/ tb                                                                                                                                                                                                     Modalities                                                                                                       Assessment: Tolerated treatment without reports of exacerbation of back pain symptoms; patient's chief complaint is left knee pain-although denied aggravation of knee symtpoms with activity participation today   Patient exhibited good technique with therapeutic exercises  Patient did have difficulty with PPT/abdominal recruitment; repeated cues needed  Plan: Continue per plan of care

## 2018-10-17 ENCOUNTER — OFFICE VISIT (OUTPATIENT)
Dept: PHYSICAL THERAPY | Facility: CLINIC | Age: 78
End: 2018-10-17
Payer: COMMERCIAL

## 2018-10-17 DIAGNOSIS — M62.830 LUMBAR PARASPINAL MUSCLE SPASM: ICD-10-CM

## 2018-10-17 DIAGNOSIS — M54.50 ACUTE RIGHT-SIDED LOW BACK PAIN WITHOUT SCIATICA: Primary | ICD-10-CM

## 2018-10-17 DIAGNOSIS — M53.3 SACROILIAC JOINT DYSFUNCTION OF RIGHT SIDE: ICD-10-CM

## 2018-10-17 DIAGNOSIS — R29.898 WEAKNESS OF BOTH HIPS: ICD-10-CM

## 2018-10-17 DIAGNOSIS — M51.36 DEGENERATIVE DISC DISEASE, LUMBAR: ICD-10-CM

## 2018-10-17 DIAGNOSIS — H54.7 POOR EYESIGHT: ICD-10-CM

## 2018-10-17 DIAGNOSIS — Z79.899 LONG-TERM USE OF HIGH-RISK MEDICATION: Primary | ICD-10-CM

## 2018-10-17 DIAGNOSIS — H35.9 RETINAL DISEASE: ICD-10-CM

## 2018-10-17 PROCEDURE — 97110 THERAPEUTIC EXERCISES: CPT | Performed by: PHYSICAL THERAPIST

## 2018-10-17 PROCEDURE — 97530 THERAPEUTIC ACTIVITIES: CPT | Performed by: PHYSICAL THERAPIST

## 2018-10-17 NOTE — TELEPHONE ENCOUNTER
Pt stated she was seen by her ophthalmologist and he recommended that she contact her PCP to have her cholesterol level which may be the cause of her eye issues

## 2018-10-17 NOTE — PROGRESS NOTES
Daily Note     Today's date: 10/17/2018  Patient name: Zelda Fontaine  : 1940  MRN: [id number]  Referring provider: Masood Mendes DO  Dx:   Encounter Diagnosis     ICD-10-CM    1  Acute right-sided low back pain without sciatica M54 5    2  Degenerative disc disease, lumbar M51 36    3  Lumbar paraspinal muscle spasm M62 830    4  Sacroiliac joint dysfunction of right side M53 3    5  Weakness of both hips R29 898                   Subjective:  Patient presents states that she feels good, no pain  Reports no difficulty with addition of weight except for left le saq/ slr in supine  Objective: See treatment diary below     Manual                                                                                                                                                      Exercise Diary  10/10 10/16  10/ 17                 Aktc/ dktc 5/10s x 10 ea :05/:10  10x each  5/10s x 10 ea                 Ppt / ltr  3/5s x 20/10 :05  20x each  5s x 20 ea                 bridging 3s x 20 20x   20x                  B:  SLR/ SAQ   10x2 each  1# x 20                                         Supine dls s/a/l   64U  8# le x 10x                   laq/ hip flexion     20x ea                  Standing hr   10x2  20x                  Squats @ bar      20x                  slr stand x 3, hs curls   10x each  1#  X 20ea                                         biodex  LOS                       Sidestep w/ tb      rtb 20' x 3                                                                                                                                                                                               Modalities                                                                                                       Assessment: tolerated session well today - left knee pain limits her somewhat only in supine     Plan: Continue per plan of care  Progress dynamic activitiies

## 2018-10-18 ENCOUNTER — APPOINTMENT (OUTPATIENT)
Dept: LAB | Facility: CLINIC | Age: 78
End: 2018-10-18
Payer: COMMERCIAL

## 2018-10-18 DIAGNOSIS — H35.9 RETINAL DISEASE: ICD-10-CM

## 2018-10-18 DIAGNOSIS — H54.7 POOR EYESIGHT: ICD-10-CM

## 2018-10-18 DIAGNOSIS — Z79.899 LONG-TERM USE OF HIGH-RISK MEDICATION: ICD-10-CM

## 2018-10-18 LAB
ALBUMIN SERPL BCP-MCNC: 3.8 G/DL (ref 3.5–5)
ALP SERPL-CCNC: 64 U/L (ref 46–116)
ALT SERPL W P-5'-P-CCNC: 32 U/L (ref 12–78)
ANION GAP SERPL CALCULATED.3IONS-SCNC: 3 MMOL/L (ref 4–13)
AST SERPL W P-5'-P-CCNC: 21 U/L (ref 5–45)
BASOPHILS # BLD AUTO: 0.03 THOUSANDS/ΜL (ref 0–0.1)
BASOPHILS NFR BLD AUTO: 1 % (ref 0–1)
BILIRUB SERPL-MCNC: 0.59 MG/DL (ref 0.2–1)
BILIRUB UR QL STRIP: NEGATIVE
BUN SERPL-MCNC: 23 MG/DL (ref 5–25)
CALCIUM SERPL-MCNC: 9.2 MG/DL (ref 8.3–10.1)
CHLORIDE SERPL-SCNC: 107 MMOL/L (ref 100–108)
CHOLEST SERPL-MCNC: 194 MG/DL (ref 50–200)
CLARITY UR: CLEAR
CO2 SERPL-SCNC: 29 MMOL/L (ref 21–32)
COLOR UR: YELLOW
CREAT SERPL-MCNC: 0.6 MG/DL (ref 0.6–1.3)
EOSINOPHIL # BLD AUTO: 0.15 THOUSAND/ΜL (ref 0–0.61)
EOSINOPHIL NFR BLD AUTO: 3 % (ref 0–6)
ERYTHROCYTE [DISTWIDTH] IN BLOOD BY AUTOMATED COUNT: 13 % (ref 11.6–15.1)
EST. AVERAGE GLUCOSE BLD GHB EST-MCNC: 117 MG/DL
GFR SERPL CREATININE-BSD FRML MDRD: 89 ML/MIN/1.73SQ M
GLUCOSE P FAST SERPL-MCNC: 90 MG/DL (ref 65–99)
GLUCOSE UR STRIP-MCNC: NEGATIVE MG/DL
HBA1C MFR BLD: 5.7 % (ref 4.2–6.3)
HCT VFR BLD AUTO: 40.3 % (ref 34.8–46.1)
HDLC SERPL-MCNC: 66 MG/DL (ref 40–60)
HGB BLD-MCNC: 13.2 G/DL (ref 11.5–15.4)
HGB UR QL STRIP.AUTO: NEGATIVE
IMM GRANULOCYTES # BLD AUTO: 0.02 THOUSAND/UL (ref 0–0.2)
IMM GRANULOCYTES NFR BLD AUTO: 0 % (ref 0–2)
KETONES UR STRIP-MCNC: NEGATIVE MG/DL
LDLC SERPL CALC-MCNC: 112 MG/DL (ref 0–100)
LEUKOCYTE ESTERASE UR QL STRIP: NEGATIVE
LYMPHOCYTES # BLD AUTO: 1.29 THOUSANDS/ΜL (ref 0.6–4.47)
LYMPHOCYTES NFR BLD AUTO: 29 % (ref 14–44)
MCH RBC QN AUTO: 30.8 PG (ref 26.8–34.3)
MCHC RBC AUTO-ENTMCNC: 32.8 G/DL (ref 31.4–37.4)
MCV RBC AUTO: 94 FL (ref 82–98)
MONOCYTES # BLD AUTO: 0.34 THOUSAND/ΜL (ref 0.17–1.22)
MONOCYTES NFR BLD AUTO: 8 % (ref 4–12)
NEUTROPHILS # BLD AUTO: 2.7 THOUSANDS/ΜL (ref 1.85–7.62)
NEUTS SEG NFR BLD AUTO: 59 % (ref 43–75)
NITRITE UR QL STRIP: NEGATIVE
NRBC BLD AUTO-RTO: 0 /100 WBCS
PH UR STRIP.AUTO: 6.5 [PH] (ref 4.5–8)
PLATELET # BLD AUTO: 188 THOUSANDS/UL (ref 149–390)
PMV BLD AUTO: 10.4 FL (ref 8.9–12.7)
POTASSIUM SERPL-SCNC: 4.3 MMOL/L (ref 3.5–5.3)
PROT SERPL-MCNC: 7.2 G/DL (ref 6.4–8.2)
PROT UR STRIP-MCNC: NEGATIVE MG/DL
RBC # BLD AUTO: 4.29 MILLION/UL (ref 3.81–5.12)
SODIUM SERPL-SCNC: 139 MMOL/L (ref 136–145)
SP GR UR STRIP.AUTO: 1.01 (ref 1–1.03)
TRIGL SERPL-MCNC: 82 MG/DL
TSH SERPL DL<=0.05 MIU/L-ACNC: 3.5 UIU/ML (ref 0.36–3.74)
UROBILINOGEN UR QL STRIP.AUTO: 0.2 E.U./DL
WBC # BLD AUTO: 4.53 THOUSAND/UL (ref 4.31–10.16)

## 2018-10-18 PROCEDURE — 80061 LIPID PANEL: CPT

## 2018-10-18 PROCEDURE — 36415 COLL VENOUS BLD VENIPUNCTURE: CPT

## 2018-10-18 PROCEDURE — 83036 HEMOGLOBIN GLYCOSYLATED A1C: CPT

## 2018-10-18 PROCEDURE — 81003 URINALYSIS AUTO W/O SCOPE: CPT | Performed by: INTERNAL MEDICINE

## 2018-10-18 PROCEDURE — 80053 COMPREHEN METABOLIC PANEL: CPT

## 2018-10-18 PROCEDURE — 84443 ASSAY THYROID STIM HORMONE: CPT

## 2018-10-18 PROCEDURE — 85025 COMPLETE CBC W/AUTO DIFF WBC: CPT

## 2018-10-23 ENCOUNTER — OFFICE VISIT (OUTPATIENT)
Dept: INTERNAL MEDICINE CLINIC | Facility: CLINIC | Age: 78
End: 2018-10-23
Payer: COMMERCIAL

## 2018-10-23 VITALS
HEART RATE: 77 BPM | WEIGHT: 190 LBS | HEIGHT: 62 IN | SYSTOLIC BLOOD PRESSURE: 114 MMHG | OXYGEN SATURATION: 96 % | DIASTOLIC BLOOD PRESSURE: 68 MMHG | BODY MASS INDEX: 34.96 KG/M2

## 2018-10-23 DIAGNOSIS — M19.90 ARTHRITIS: ICD-10-CM

## 2018-10-23 DIAGNOSIS — K63.5 BENIGN COLON POLYP: ICD-10-CM

## 2018-10-23 DIAGNOSIS — M81.0 AGE-RELATED OSTEOPOROSIS WITHOUT CURRENT PATHOLOGICAL FRACTURE: Primary | ICD-10-CM

## 2018-10-23 DIAGNOSIS — J45.20 MILD INTERMITTENT ASTHMA WITHOUT COMPLICATION: ICD-10-CM

## 2018-10-23 DIAGNOSIS — Z23 ENCOUNTER FOR IMMUNIZATION: ICD-10-CM

## 2018-10-23 DIAGNOSIS — Z12.39 BREAST SCREENING: ICD-10-CM

## 2018-10-23 DIAGNOSIS — H34.8310 BRANCH RETINAL VEIN OCCLUSION OF RIGHT EYE WITH MACULAR EDEMA: ICD-10-CM

## 2018-10-23 PROBLEM — J45.909 ASTHMA: Status: ACTIVE | Noted: 2018-10-23

## 2018-10-23 PROCEDURE — G0009 ADMIN PNEUMOCOCCAL VACCINE: HCPCS

## 2018-10-23 PROCEDURE — 90732 PPSV23 VACC 2 YRS+ SUBQ/IM: CPT

## 2018-10-23 PROCEDURE — 99214 OFFICE O/P EST MOD 30 MIN: CPT | Performed by: INTERNAL MEDICINE

## 2018-10-23 PROCEDURE — 90662 IIV NO PRSV INCREASED AG IM: CPT

## 2018-10-23 PROCEDURE — G0008 ADMIN INFLUENZA VIRUS VAC: HCPCS

## 2018-10-23 NOTE — PROGRESS NOTES
Assessment/Plan:       Diagnoses and all orders for this visit:    Age-related osteoporosis without current pathological fracture    Arthritis    Benign colon polyp    Breast screening    Mild intermittent asthma without complication    Encounter for immunization  -     PNEUMOCOCCAL POLYSACCHARIDE VACCINE 23-VALENT =>1YO SQ IM  -     influenza vaccine, [id number], high-dose, PF 0 5 mL, for patients 65 yr+ (FLUZONE HIGH-DOSE)    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    Other orders  -     TURMERIC PO; Take 500 Units by mouth daily          Patient Instructions   Doing well overall on multiple fronts  Ophthalmology suggest you take 81 mg aspirin daily for the retinal vein occlusion; we reviewed you numbers on the ophthalmologist believes that neither blood pressure medicine or cholesterol medicine will improve the I situation  Follow-up in 6 months  Subjective:      Patient ID: Britni Levin is a 78 y o  female  A 51-year-old female  Endometrial cancer treated at [facility]  Intermittent mild intermittent cough variant asthma  Associated rhinitis  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 extending from calcaneus to the midfoot  History of benign colon polyp  Colonoscopy done earlier in 2018  Mammogram done a year ago  She developed visual defect of the right eye  She went to an ophthalmologist who is diagnosis is "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 "  Apparently, the suggestion is that this is due to either hyperlipidemia or hypertension  I spoke with the ophthalmologist and explain that she is not hypertensive nor hyperlipidemic  I reviewed the numbers with him  He agreed that there was no indication to treat with antihypertensive or antihyperlipidemic drugs    And he suggested that the only treatment for this situation would be addition of aspirin 81 mg daily which she had already suggested to the patient  The following portions of the patient's history were reviewed and updated as appropriate:   She has a past medical history of Arthritis; Asthma; Benign colon polyp; Effusion of knee; Endometrial cancer ([facility] [address] ); Esophagitis, reflux; Hematuria; Hemorrhoids, internal; Hyperlipidemia; Myalgia; Sebaceous gland disease; and Uterine cancer ([facility] [address] )  ,   does not have any pertinent problems on file  ,   has a past surgical history that includes Appendectomy; Oophorectomy; Lymph node biopsy; Hysterectomy; Laparoscopy; Knee arthroscopy; Robotic assisted hysterectomy; Wrist surgery; Salpingoophorectomy (Bilateral); Lymphadenectomy; Tubal ligation; HAND FRACTURE REPAIR; and Lymphadenectomy  ,  family history includes Cancer in her father and mother; Stomach cancer in her father  ,   reports that she has never smoked  She has never used smokeless tobacco  She reports that she does not drink alcohol or use drugs  ,  has No Known Allergies     Current Outpatient Prescriptions   Medication Sig Dispense Refill    Cyanocobalamin (VITAMIN B-12) 1000 MCG/15ML LIQD Take 4 tablets by mouth daily      Ergocalciferol (VITAMIN D2) 2000 units TABS Take 1 tablet by mouth daily      latanoprost (XALATAN) 0 005 % ophthalmic solution Apply 1 drop to eye      meloxicam (MOBIC) 15 mg tablet Take 1 tablet (15 mg total) by mouth daily 30 tablet 1    Multiple Vitamins-Minerals (MULTI-B-PLUS) TABS Take by mouth      TURMERIC PO Take 500 Units by mouth daily       No current facility-administered medications for this visit  Review of Systems   Constitutional: Negative for chills and fever  HENT: Negative for sore throat and trouble swallowing  Eyes: Positive for photophobia and visual disturbance  Negative for pain  Respiratory: Negative for cough, shortness of breath and wheezing  Cardiovascular: Negative for chest pain and leg swelling  Gastrointestinal: Negative for abdominal pain, diarrhea, nausea and vomiting  Endocrine: Negative for cold intolerance and heat intolerance  Genitourinary: Negative for dysuria, frequency and pelvic pain  Musculoskeletal: Positive for arthralgias  Negative for joint swelling  Skin: Positive for rash  Negative for wound  Allergic/Immunologic: Negative for immunocompromised state  Neurological: Negative for dizziness, seizures, syncope and headaches  Psychiatric/Behavioral: Negative for dysphoric mood  The patient is not nervous/anxious  Objective:  Vitals:    10/23/18 0831   BP: 114/68   Pulse: 77   SpO2: 96%      Physical Exam   Constitutional: She is oriented to person, place, and time  She appears well-developed and well-nourished  HENT:   Head: Normocephalic and atraumatic  Eyes: Pupils are equal, round, and reactive to light  EOM are normal    Neck: Normal range of motion  Neck supple  No tracheal deviation present  No thyromegaly present  Cardiovascular: Normal rate, regular rhythm and normal heart sounds  Exam reveals no gallop  No murmur heard  Pulmonary/Chest: No respiratory distress  She has no wheezes  She has no rales  Abdominal: Soft  Bowel sounds are normal  There is no tenderness  Musculoskeletal: Normal range of motion  She exhibits no tenderness or deformity  Neurological: She is alert and oriented to person, place, and time  Coordination normal    Skin: Skin is warm  Psychiatric: She has a normal mood and affect   Judgment normal

## 2018-10-23 NOTE — PATIENT INSTRUCTIONS
Doing well overall on multiple fronts  Ophthalmology suggest you take 81 mg aspirin daily for the retinal vein occlusion; we reviewed you numbers on the ophthalmologist believes that neither blood pressure medicine or cholesterol medicine will improve the I situation  Follow-up in 6 months

## 2018-10-24 ENCOUNTER — OFFICE VISIT (OUTPATIENT)
Dept: PHYSICAL THERAPY | Facility: CLINIC | Age: 78
End: 2018-10-24
Payer: COMMERCIAL

## 2018-10-24 DIAGNOSIS — R29.898 WEAKNESS OF BOTH HIPS: ICD-10-CM

## 2018-10-24 DIAGNOSIS — M62.830 LUMBAR PARASPINAL MUSCLE SPASM: ICD-10-CM

## 2018-10-24 DIAGNOSIS — M53.3 SACROILIAC JOINT DYSFUNCTION OF RIGHT SIDE: ICD-10-CM

## 2018-10-24 DIAGNOSIS — M54.50 ACUTE RIGHT-SIDED LOW BACK PAIN WITHOUT SCIATICA: Primary | ICD-10-CM

## 2018-10-24 DIAGNOSIS — M16.0 BILATERAL HIP JOINT ARTHRITIS: ICD-10-CM

## 2018-10-24 DIAGNOSIS — M51.36 DEGENERATIVE DISC DISEASE, LUMBAR: ICD-10-CM

## 2018-10-24 PROCEDURE — 97110 THERAPEUTIC EXERCISES: CPT

## 2018-10-24 PROCEDURE — 97112 NEUROMUSCULAR REEDUCATION: CPT

## 2018-10-24 NOTE — PROGRESS NOTES
Daily Note     Today's date: 10/24/2018  Patient name: Britni Levin  : 1940  MRN: [id number]  Referring provider: Garett Malin DO  Dx:   Encounter Diagnosis     ICD-10-CM    1  Acute right-sided low back pain without sciatica M54 5    2  Degenerative disc disease, lumbar M51 36    3  Lumbar paraspinal muscle spasm M62 830    4  Sacroiliac joint dysfunction of right side M53 3    5  Weakness of both hips R29 898    6  Bilateral hip joint arthritis M16 0                   Subjective: Upon presentation, SPR=0/10  Patient's only complaint is left knee discomfort        Objective: See treatment diary below     Manual                                                                                                                                                      Exercise Diary  10/10 10/16  10/ 17 10/24               Aktc/ dktc 5/10s x 10 ea :05/:10  10x each  5/10s x 10 ea :05/:10  10x each               Ppt / ltr  3/5s x 20/10 :05  20x each  5s x 20 ea :05/:10  20x/10x               bridging 3s x 20 20x   20x  25x               B:  SLR/ SAQ   10x2 each  1# x 20 1 5#  20x each                                       Supine dls s/a/l   69O  7# le x 10x  1 5#  15x                laq/ hip flexion     20x ea  1 5#  20x each               Standing hr   10x2  20x  20x on foam               Squats @ bar      20x  20x on foam               slr stand x 3, hs curls   10x each  1#  X 20ea 1 5#  20x each                                       biodex  LOS       Static  3x               Sidestep w/ tb      rtb 20' x 3 RTB  20'x4                                                                                                                                                                                             Modalities                                                                                                       Assessment: Tolerated treatment and progression of program without reports of exacerbation of back symptoms or limitations in performance  Patient exhibited good technique with therapeutic exercises      Plan: Continue per plan of care

## 2018-10-26 ENCOUNTER — OFFICE VISIT (OUTPATIENT)
Dept: PHYSICAL THERAPY | Facility: CLINIC | Age: 78
End: 2018-10-26
Payer: COMMERCIAL

## 2018-10-26 DIAGNOSIS — M51.36 DEGENERATIVE DISC DISEASE, LUMBAR: ICD-10-CM

## 2018-10-26 DIAGNOSIS — M54.50 ACUTE RIGHT-SIDED LOW BACK PAIN WITHOUT SCIATICA: Primary | ICD-10-CM

## 2018-10-26 DIAGNOSIS — M62.830 LUMBAR PARASPINAL MUSCLE SPASM: ICD-10-CM

## 2018-10-26 PROCEDURE — G8990 OTHER PT/OT CURRENT STATUS: HCPCS | Performed by: PHYSICAL THERAPIST

## 2018-10-26 PROCEDURE — 97110 THERAPEUTIC EXERCISES: CPT | Performed by: PHYSICAL THERAPIST

## 2018-10-26 PROCEDURE — G8991 OTHER PT/OT GOAL STATUS: HCPCS | Performed by: PHYSICAL THERAPIST

## 2018-10-26 PROCEDURE — 97112 NEUROMUSCULAR REEDUCATION: CPT | Performed by: PHYSICAL THERAPIST

## 2018-10-26 NOTE — PROGRESS NOTES
Daily Note     Today's date: 10/26/2018  Patient name: Ileana Kaye  : 1940  MRN: [id number]  Referring provider: Dimple Dhillon DO  Dx:   Encounter Diagnosis     ICD-10-CM    1  Acute right-sided low back pain without sciatica M54 5    2  Degenerative disc disease, lumbar M51 36    3  Lumbar paraspinal muscle spasm M62 830                   Subjective: Upon presentation, SPR=0/10  Patient reports that the PT has been helpful with no pain lb/ right hip now   "I can just get up" from a seated position now whereas before she states it was painful and she had to push up     Notes that Left knee pain continues, but "this is usual"    Objective: See treatment diary below     Manual                                                                                                                                                      Exercise Diary  10/10 10/16  10/ 17 10/24  10/26             Aktc/ dktc 5/10s x 10 ea :05/:10  10x each  5/10s x 10 ea :05/:10  10x each  5/10s x 10 ea             Ppt / ltr  3/5s x 20/10 :05  20x each  5s x 20 ea :05/:10  20x/10x  3/5s x 20/10x             bridging 3s x 20 20x   20x  25x  25x             B:  SLR/ SAQ   10x2 each  1# x 20 1 5#  20x each  1 5# x 20               bike         10'             Supine dls s/a/l   94G  4# le x 10x  1 5#  15x  1 5 x 20 alt              laq/ hip flexion     20x ea  1 5#  20x each  1 5# x 20 ea             Standing hr   10x2  20x  20x on foam  20x on foam             Squats @ bar      20x  20x on foam  20x on foam             slr stand x 3, hs curls   10x each  1#  X 20ea 1 5#  20x each  1 5# x 20 ea on foam                                     biodex  LOS       Static  3x               Sidestep w/ tb      rtb 20' x 3 RTB  20'x4  gtb x 5 @ bar                                                                                                                                                                   Modalities                                                                                                        Assessment: Tolerated treatment well today -  Nice improvements with FOTO today  Plan: Continue per plan of care

## 2018-10-31 ENCOUNTER — APPOINTMENT (OUTPATIENT)
Dept: PHYSICAL THERAPY | Facility: CLINIC | Age: 78
End: 2018-10-31
Payer: COMMERCIAL

## 2018-11-14 PROCEDURE — G8992 OTHER PT/OT  D/C STATUS: HCPCS | Performed by: PHYSICAL THERAPIST

## 2018-11-14 PROCEDURE — G8991 OTHER PT/OT GOAL STATUS: HCPCS | Performed by: PHYSICAL THERAPIST

## 2018-11-14 NOTE — PROGRESS NOTES
PT Discharge    Today's date: 2018  Patient name: Britni Levin  : 1940  MRN: [id number]  Referring provider: Garett Malin DO  Dx:   Encounter Diagnosis     ICD-10-CM    1  Acute right-sided low back pain without sciatica M54 5    2  Degenerative disc disease, lumbar M51 36    3  Lumbar paraspinal muscle spasm M62 830        Start Time: 8514  Stop Time: 09  Total time in clinic (min): 61 minutes    Assessment    Assessment details:   Patient was last seen on 10-26-18 = she did not attend further visit thus final measurements are unavailable  On last couple of attended sessions - patient was reporting nice improvements, little - no pain  Understanding of Dx/Px/POC: good   Prognosis: good    Goals  STG partially met  1  Decrease pain by at least two subjective ratings in 4 weeks  2  Increase trunk AROM to wfl - no pain end range 4 weeks  3  Improve motor function  4 weeks    LTG    1  Independent with HEP  MET to date  2  Patient will report improvements with both functional and recreational abilities 6 weeks  Partially met  3  Patient will report reduced pain > 50% 6-8 weeks MET      Plan  Planned therapy interventions: home exercise program  Plan details: Patient had been demonstrating independence and competence with Harvey Alpers will be discharged to such with MD follow up prn    Thank you for this referral          Subjective Evaluation    Pain  Current pain ratin  At best pain ratin  At worst pain ratin  Quality: sharp  Aggravating factors: sitting and walking  Progression: improved    Social Support  Lives in: multiple-level home  Lives with: alone    Hand dominance: right    Patient Goals  Patient goals for therapy: decreased pain, increased motion and independence with ADLs/IADLs  Patient goal: "I want to walk normal, I don't want to be wobbly"        Objective     Special Questions  Positive for disturbed sleep   Negative for night pain, bladder dysfunction, bowel dysfunction and saddle (S4) numbness    Additional Special Questions  Patient reports disturbed sleep secondary to being uncomfortable and not finding any position that works for her  Postural Observations  Seated posture: fair  Standing posture: fair  Correction of posture: has no consistent effect    Additional Postural Observation Details  Patient tends to demonstrate slight rounding with fhp, shoulders ir/ protracted slightly, flattened lordosis  Palpation   Left   No palpable tenderness to the lumbar paraspinals  Right   No palpable tenderness to the lumbar paraspinals  Additional Palpation Details  (-) tenderness piriformis musculature on left    Tenderness     Lumbar Spine  No tenderness in the spinous process  Left Hip   No tenderness in the PSIS  Right Hip   No tenderness in the PSIS  Neurological Testing     Sensation     Lumbar   Left   Intact: light touch    Right   Intact: light touch    Active Range of Motion     Additional Active Range of Motion Details  fis  - min loss  RFIS - NE  eis - Mod loss  RFIL - NE    Prone lying - NE     Strength/Myotome Testing     Left Hip   Planes of Motion   Flexion: 4-  Abduction: 4-  Adduction: 4  Internal rotation: 4-    Right Hip   Planes of Motion   Flexion: 4-  Abduction: 4-  Adduction: 4  External rotation: 4-  Internal rotation: 4-    Left Knee   Flexion: 4+  Extension: 4    Right Knee   Flexion: 4+  Extension: 4+    Left Ankle/Foot   Dorsiflexion: 5    Right Ankle/Foot   Dorsiflexion: 5    Tests       Thoracic   Negative slump  Lumbar   Negative slump  Left   Negative passive SLR  Right   Negative passive SLR  General Comments     Lumbar Comments  Gait - patient occasionally wobbly - demonstrating extra steps to maintain straight walking     No assistive device ( although she does have a SPC that she will use on occasion)    BIODEX -   EO, firm = 1 35  EC, firm = 2 11  EO, foam = 1 31  EC, foam = 8 21 (+) FALL  Composite = 3 24      Flowsheet Rows      Most Recent Value   PT/OT G-Codes   Current Score  69   Projected Score  51   FOTO information reviewed  Yes   Assessment Type  Re-evaluation [foto only]   G code set  Other PT/OT Primary   Other PT Primary Current Status ()  CJ   Other PT Primary Goal Status ()  CK          Precautions: LBP, Right hip pain - hx left knee pain (arthritis)    Daily Treatment Diary

## 2018-11-15 ENCOUNTER — APPOINTMENT (OUTPATIENT)
Dept: RADIOLOGY | Facility: CLINIC | Age: 78
End: 2018-11-15
Payer: COMMERCIAL

## 2018-11-15 ENCOUNTER — OFFICE VISIT (OUTPATIENT)
Dept: OBGYN CLINIC | Facility: CLINIC | Age: 78
End: 2018-11-15

## 2018-11-15 VITALS
BODY MASS INDEX: 34.96 KG/M2 | HEART RATE: 83 BPM | DIASTOLIC BLOOD PRESSURE: 74 MMHG | WEIGHT: 190 LBS | HEIGHT: 62 IN | SYSTOLIC BLOOD PRESSURE: 123 MMHG

## 2018-11-15 DIAGNOSIS — G89.29 CHRONIC PAIN OF LEFT KNEE: ICD-10-CM

## 2018-11-15 DIAGNOSIS — M17.12 PRIMARY OSTEOARTHRITIS OF LEFT KNEE: Primary | ICD-10-CM

## 2018-11-15 DIAGNOSIS — M25.562 CHRONIC PAIN OF LEFT KNEE: ICD-10-CM

## 2018-11-15 DIAGNOSIS — M17.12 PRIMARY OSTEOARTHRITIS OF LEFT KNEE: ICD-10-CM

## 2018-11-15 PROCEDURE — 99213 OFFICE O/P EST LOW 20 MIN: CPT | Performed by: FAMILY MEDICINE

## 2018-11-15 PROCEDURE — 73562 X-RAY EXAM OF KNEE 3: CPT

## 2018-11-15 NOTE — PROGRESS NOTES
Assessment/Plan:  Assessment/Plan   Diagnoses and all orders for this visit:    Primary osteoarthritis of left knee  -     XR knee 3 vw left non injury; Future  -     Brace    Chronic pain of left knee  -     XR knee 3 vw left non injury; Future        80-year-old female with chronic left knee pain discussed with patient physical exam, radiographs, impression, plan  X-rays noted for degenerative changes of the knee joint most pronounced at the medial and patellofemoral joints  Physical exam is noted for moderate tenderness of the medial joint line and crepitus with range of motion  I discussed with patient that since she has not improved with multiple rounds of corticosteroid injection and viscosupplementation she may consider surgical intervention in the form of knee replacement, to which patient expresses interest   Also discussed with her option of wearing medial  knee brace to which she agreed  She is to be provided with medial  knee brace and advised patient that if pain does not improved, she is to contact the office and I will provide her a referral to orthopedic surgeon  Subjective:   Patient ID: Ankit Portillo is a 78 y o  female  Chief Complaint   Patient presents with    Left Knee - Pain       80-year-old female presents for evaluation of chronic left knee pain of more than 2 years duration  She reports history of having had arthroscopic left knee surgery more than 10 years ago, and after surgery she has been without any pain of left knee up until few years ago  She was diagnosed with osteoarthritis and received corticosteroid injections and then started viscosupplementation  She had 1st viscosupplementation series done approximately 2 years ago of which relief of pain lasted more than 1 year  Her last viscosupplementation injection was about 3-4 months ago and she states that after the injection she did not experience any improvement in pain    She has also been taking meloxicam 15 mg once daily without any improvement  Pain described as generalized to the knee but worse at the medial aspect, nonradiating, constant, achy and sometimes sharp, worse with ambulation, and improved with rest   She reports that in the past she was able to ambulate for more than few hours, but now she is able to ambulate for only 10-15 minutes before having to stop due to pain  Knee Pain   This is a chronic problem  The current episode started more than 1 year ago  The problem occurs constantly  The problem has been gradually worsening  Associated symptoms include arthralgias and joint swelling  Pertinent negatives include no numbness or weakness  The symptoms are aggravated by walking and standing  She has tried rest and NSAIDs (Corticosteroid injection, viscosupplementation) for the symptoms  The treatment provided no relief  Review of Systems   Musculoskeletal: Positive for arthralgias and joint swelling  Neurological: Negative for weakness and numbness  Objective:  Vitals:    11/15/18 0834   BP: 123/74   Pulse: 83   Weight: 86 2 kg (190 lb)   Height: 5' 2" (1 575 m)     Left Knee Exam     Tenderness   The patient is experiencing tenderness in the medial joint line  Range of Motion   Extension: normal   Flexion: 140     Muscle Strength     The patient has normal left knee strength  Tests   Varus: negative  Valgus: negative    Other   Swelling: mild  Effusion: no effusion present    Comments:  Crepitus          Observations   Left Knee   Negative for effusion  Physical Exam   Constitutional: She is oriented to person, place, and time  She appears well-developed  No distress  HENT:   Head: Normocephalic  Eyes: Conjunctivae are normal    Neck: No tracheal deviation present  Cardiovascular: Normal rate  Pulmonary/Chest: Effort normal  No respiratory distress  Abdominal: She exhibits no distension     Musculoskeletal:        Left knee: She exhibits no effusion  Neurological: She is alert and oriented to person, place, and time  Skin: Skin is warm and dry  Psychiatric: She has a normal mood and affect  Her behavior is normal    Nursing note and vitals reviewed  I have personally reviewed pertinent films in PACS and my interpretation is Osteoarthritis of the left knee, most pronounced at the medial tibiofemoral and patellofemoral joints

## 2018-11-19 ENCOUNTER — OFFICE VISIT (OUTPATIENT)
Dept: GYNECOLOGIC ONCOLOGY | Facility: CLINIC | Age: 78
End: 2018-11-19
Payer: COMMERCIAL

## 2018-11-19 VITALS
DIASTOLIC BLOOD PRESSURE: 80 MMHG | HEIGHT: 62 IN | RESPIRATION RATE: 14 BRPM | SYSTOLIC BLOOD PRESSURE: 122 MMHG | BODY MASS INDEX: 35.48 KG/M2 | WEIGHT: 192.8 LBS | TEMPERATURE: 98 F | HEART RATE: 60 BPM

## 2018-11-19 DIAGNOSIS — C54.1 ENDOMETRIAL CANCER (HCC): Primary | ICD-10-CM

## 2018-11-19 PROCEDURE — 4040F PNEUMOC VAC/ADMIN/RCVD: CPT | Performed by: PHYSICIAN ASSISTANT

## 2018-11-19 PROCEDURE — 99214 OFFICE O/P EST MOD 30 MIN: CPT | Performed by: PHYSICIAN ASSISTANT

## 2018-11-19 RX ORDER — TACROLIMUS 1 MG/G
OINTMENT TOPICAL
COMMUNITY
Start: 2018-09-10

## 2018-11-19 NOTE — ASSESSMENT & PLAN NOTE
Stage IB, grade 2 with positive peritoneal cytology, s/p adjuvant treatment in November 2015  Clinically without evidence of disease recurrence  Return to the office in 6 months for continued cancer surveillance

## 2018-11-19 NOTE — PROGRESS NOTES
Assessment/Plan:    Problem List Items Addressed This Visit        Genitourinary    Endometrial cancer ([facility] ) - Primary     Stage IB, grade 2 with positive peritoneal cytology, s/p adjuvant treatment in November 2015  Clinically without evidence of disease recurrence  Return to the office in 6 months for continued cancer surveillance  CHIEF COMPLAINT:   Endometrial cancer surveillance    Problem:  Cancer Staging  Endometrial cancer ([facility] )  Staging form: Corpus Uteri - Carcinoma, AJCC 8th Edition  - Clinical: FIGO Stage IB - Signed by Sonu Patel PA-C on 5/14/2018        Previous therapy:     Endometrial cancer [facility])     Initial Diagnosis     Endometrial cancer ([facility] )         7/6/2015 Surgery     Robotic-assisted total laparoscopic hysterectomy, bilateral salpingo-oophorectomy, pelvic and para-aortic lymph node dissections on   A  1 3 out of 1 8 cm depth of invasion, negative nodes, no LVSI, positive peritoneal cytology          - 11/20/2015 Chemotherapy     Paclitaxel 175 mg/m2 and carboplatin AUC 6  She received six cycles  - 11/2015 Radiation     Vaginal brachytherapy              Patient ID: Deirdre Claros is a 78 y o  female  who has no new complaints today  No vaginal bleeding, abdominal/pelvic pain  Normal bowel and bladder function  No interval change in medical history since last visit  Quality of life is good  The following portions of the patient's history were reviewed and updated as appropriate: allergies, current medications, past medical history and problem list     Review of Systems   Constitutional: Negative  HENT: Negative  Eyes: Negative  Respiratory: Negative  Cardiovascular: Negative  Gastrointestinal: Negative  Genitourinary: Negative  Musculoskeletal: Negative  Skin: Negative  Neurological: Negative  Psychiatric/Behavioral: Negative          Current Outpatient Prescriptions   Medication Sig Dispense Refill    tacrolimus (PROTOPIC) 0 1 % ointment       Cyanocobalamin (VITAMIN B-12) 1000 MCG/15ML LIQD Take 4 tablets by mouth daily      Ergocalciferol (VITAMIN D2) 2000 units TABS Take 1 tablet by mouth daily      latanoprost (XALATAN) 0 005 % ophthalmic solution Apply 1 drop to eye      meloxicam (MOBIC) 15 mg tablet Take 1 tablet (15 mg total) by mouth daily 30 tablet 1    Multiple Vitamins-Minerals (MULTI-B-PLUS) TABS Take by mouth      TURMERIC PO Take 500 Units by mouth daily       No current facility-administered medications for this visit  Objective:    Blood pressure 122/80, pulse 60, temperature 98 °F (36 7 °C), temperature source Oral, resp  rate 14, height 5' 2" (1 575 m), weight 87 5 kg (192 lb 12 8 oz)  Body mass index is 35 26 kg/m²  Body surface area is 1 88 meters squared  Physical Exam   Constitutional: She is oriented to person, place, and time  She appears well-developed and well-nourished  HENT:   Head: Normocephalic and atraumatic  Neck: Normal range of motion  Neck supple  No thyromegaly present  Pulmonary/Chest: Effort normal    Abdominal: Soft  She exhibits no distension and no mass  There is no rebound  Genitourinary:   Genitourinary Comments: The external female genitalia is normal  The bartholin's, uretheral and skenes glands are normal  The urethral meatus is normal (midline with no lesions)  Anus without fissure or lesion  Speculum exam reveals a grossly normal vagina  No masses, lesions, discharge or bleeding  No significant cystocele or rectocele noted  Bimanual exam notes a surgical absent cervix, uterus and adnexal structures  No masses or fullness  Bladder is without fullness, mass or tenderness  Musculoskeletal: Normal range of motion  She exhibits no edema  Lymphadenopathy:     She has no cervical adenopathy  Neurological: She is alert and oriented to person, place, and time  Skin: Skin is warm and dry  No rash noted     Psychiatric: She has a normal mood and affect  Her behavior is normal    Vitals reviewed

## 2019-01-16 ENCOUNTER — OFFICE VISIT (OUTPATIENT)
Dept: OBGYN CLINIC | Facility: CLINIC | Age: 79
End: 2019-01-16
Payer: COMMERCIAL

## 2019-01-16 ENCOUNTER — APPOINTMENT (OUTPATIENT)
Dept: RADIOLOGY | Facility: CLINIC | Age: 79
End: 2019-01-16
Payer: COMMERCIAL

## 2019-01-16 ENCOUNTER — OFFICE VISIT (OUTPATIENT)
Dept: INTERNAL MEDICINE CLINIC | Facility: CLINIC | Age: 79
End: 2019-01-16
Payer: COMMERCIAL

## 2019-01-16 VITALS
HEIGHT: 62 IN | BODY MASS INDEX: 35.81 KG/M2 | HEART RATE: 67 BPM | OXYGEN SATURATION: 96 % | SYSTOLIC BLOOD PRESSURE: 108 MMHG | DIASTOLIC BLOOD PRESSURE: 70 MMHG | WEIGHT: 194.6 LBS

## 2019-01-16 VITALS
BODY MASS INDEX: 35.85 KG/M2 | HEIGHT: 62 IN | HEART RATE: 73 BPM | WEIGHT: 194.8 LBS | SYSTOLIC BLOOD PRESSURE: 111 MMHG | DIASTOLIC BLOOD PRESSURE: 72 MMHG

## 2019-01-16 DIAGNOSIS — M17.12 PRIMARY OSTEOARTHRITIS OF LEFT KNEE: ICD-10-CM

## 2019-01-16 DIAGNOSIS — M17.12 PRIMARY OSTEOARTHRITIS OF LEFT KNEE: Primary | ICD-10-CM

## 2019-01-16 DIAGNOSIS — Z00.00 HEALTH CARE MAINTENANCE: Primary | ICD-10-CM

## 2019-01-16 PROBLEM — R25.2 LEG CRAMP: Status: ACTIVE | Noted: 2019-01-16

## 2019-01-16 PROCEDURE — 1125F AMNT PAIN NOTED PAIN PRSNT: CPT | Performed by: INTERNAL MEDICINE

## 2019-01-16 PROCEDURE — G0439 PPPS, SUBSEQ VISIT: HCPCS | Performed by: INTERNAL MEDICINE

## 2019-01-16 PROCEDURE — 99214 OFFICE O/P EST MOD 30 MIN: CPT | Performed by: ORTHOPAEDIC SURGERY

## 2019-01-16 PROCEDURE — 1170F FXNL STATUS ASSESSED: CPT | Performed by: INTERNAL MEDICINE

## 2019-01-16 PROCEDURE — 73562 X-RAY EXAM OF KNEE 3: CPT

## 2019-01-16 RX ORDER — CHLORHEXIDINE GLUCONATE 4 G/100ML
SOLUTION TOPICAL DAILY PRN
Status: CANCELLED | OUTPATIENT
Start: 2019-01-16

## 2019-01-16 RX ORDER — CHLORHEXIDINE GLUCONATE 0.12 MG/ML
15 RINSE ORAL ONCE
Status: CANCELLED | OUTPATIENT
Start: 2019-01-16 | End: 2019-01-16

## 2019-01-16 RX ORDER — DORZOLAMIDE HYDROCHLORIDE AND TIMOLOL MALEATE 20; 5 MG/ML; MG/ML
SOLUTION/ DROPS OPHTHALMIC
COMMUNITY
Start: 2018-12-18

## 2019-01-16 RX ORDER — ONDANSETRON 2 MG/ML
4 INJECTION INTRAMUSCULAR; INTRAVENOUS ONCE
Status: CANCELLED | OUTPATIENT
Start: 2019-01-16 | End: 2019-01-16

## 2019-01-16 NOTE — PROGRESS NOTES
HPI:  Patient is a 70y o  year old  female  who presents with chief complaint of left knee pain which patient reports is primarily on the medial aspect of the knee  Pt states that the current flare up started about 1 year ago and the pain  has substaintialy increased in the past 1 month Pt states that she has pain every day that can be described as aching pain but she has sharp pain with ambulation  Pt states that she feels as if her knee locks at times  Pt states that she also has cramps in her left leg at times  Pt has had CSI and visco injections that did provide some short term relief  Pt has been attending physical therapy with out relief  PT has tried wearing a medial  brace but states that it causes her increased pain when she wears the brace  Per pt she has had left meniscus surgery 40 years ago    ROS:   General: No fever, no chills, no weight loss, no weight gain  HEENT:  No loss of hearing, no nose bleeds, no sore throat  Eyes:  No eye pain, no red eyes, no visual disturbance  Cardiac:  Regular rate and rhythm  Pulmonary: No respiratory distress  Lung sounds clear to auscultation      Cardiovascular:  No chest pain, no palpitations, no edema  GI: No abdominal pain, no nausea, no vomiting  Endocrine: No frequent urination, no excessive thirst  Urinary:  No dysuria, no hematuria, no incontinence  Musculoskeletal: see HPI and PE  Skin:  No rash, no wounds  Neurological:  No dizziness, no headache, no numbness  Psychiatric:  No difficulty concentrating, no depression, no suicide thoughts, no anxiety  Review of all other systems is negative    PMH:  Past Medical History:   Diagnosis Date    Arthritis     Asthma     Benign colon polyp     Effusion of knee     Endometrial cancer ([facility] )     last assessed 11/14/17     Esophagitis, reflux     Hematuria     Hemorrhoids, internal     Hyperlipidemia     Myalgia     Sebaceous gland disease     Uterine cancer (HCC)        PSH:  Past Surgical History:   Procedure Laterality Date    APPENDECTOMY      HAND FRACTURE REPAIR      Open Treatment of Fracture of one Metacarpal Bone    HYSTERECTOMY      KNEE ARTHROSCOPY      Therapeutic    LYMPH NODE BIOPSY      LYMPHADENECTOMY      staging    LYMPHADENECTOMY      Staging    OOPHORECTOMY      PELVIC LAPAROSCOPY      ROBOTIC ASSISTED HYSTERECTOMY      SALPINGOOPHORECTOMY Bilateral     TUBAL LIGATION      WRIST SURGERY      open treatment of fracture of one metacarpal bone       Medications:  Current Outpatient Prescriptions   Medication Sig Dispense Refill    Cyanocobalamin (VITAMIN B-12) 1000 MCG/15ML LIQD Take 4 tablets by mouth daily      dorzolamide-timolol (COSOPT) 22 3-6 8 MG/ML ophthalmic solution       Ergocalciferol (VITAMIN D2) 2000 units TABS Take 1 tablet by mouth daily      latanoprost (XALATAN) 0 005 % ophthalmic solution Apply 1 drop to eye      meloxicam (MOBIC) 15 mg tablet Take 1 tablet (15 mg total) by mouth daily 30 tablet 1    Multiple Vitamins-Minerals (MULTI-B-PLUS) TABS Take by mouth      tacrolimus (PROTOPIC) 0 1 % ointment       TURMERIC PO Take 500 Units by mouth daily       No current facility-administered medications for this visit  Allergies:  No Known Allergies    Family History:  Family History   Problem Relation Age of Onset   James Benavides Cancer Mother         unknown    Cancer Father     Stomach cancer Father        Social History:  Social History     Occupational History    Not on file  Social History Main Topics    Smoking status: Never Smoker    Smokeless tobacco: Never Used      Comment: Denied History of Tobacco use    Alcohol use No    Drug use: No    Sexual activity: Not on file       Physical Exam:  General :  Alert, cooperative, no distress, appears stated age  Blood pressure 111/72, pulse 73, height 5' 2" (1 575 m), weight 88 4 kg (194 lb 12 8 oz)     Head:  Normocephalic, without obvious abnormality, atraumatic   Eyes:  Conjunctiva/corneas clear, EOM's intact,   Ears: Both ears normal appearance, no hearing deficits  Nose: Nares normal, septum midline, no drainage    Neck: Supple,  trachea midline, no adenopathy, no tenderness, no mass   Back:   Symmetric, no curvature, ROM normal, no tenderness   Lungs:   Respirations unlabored, clear to auscultation   Cardiac:  Regular rate and rhythm   Extremities: Extremities normal, atraumatic, no cyanosis or edema      Pulses: 2+ and symmetric   Skin: Skin color, texture, turgor normal, no rashes or lesions      Neurologic: Normal           Left Knee Exam   Swelling: Mild  Effusion: Yes    Tenderness   The patient is experiencing tenderness in the medial joint line  Range of Motion   Extension: -10  Flexion:     100    Muscle Strength   Normal left knee strength    Tests   Lachman:  Anterior - Negative      Drawer:       Posterior - Negative  Varus:  Negative  Valgus: Negative  Patellar Apprehension: No          Imaging Studies: The following imaging studies were reviewed in office today  My findings are noted  Left knee 3 views performed 11/15/18  shows osteoarthritis with  medial compartment narrowing and osteophyte formation and sclerosis  There are also moderate degenerative patella femoral changes  Assessment  Encounter Diagnosis   Name Primary?  Primary osteoarthritis of left knee Yes         Plan:  Due to having no long term relief from conservative methods pt would like to proceed with total left  knee arthroplasty  Total knee arthroplasty was discussed at length today with use of a model  Detailed consent was signed  Pt understands the risks and benefits of the procedure  Pt will follow up in the office after surgery

## 2019-01-16 NOTE — PROGRESS NOTES
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     Past Medical History:   Diagnosis Date    Arthritis     Asthma     Benign colon polyp     Effusion of knee     Endometrial cancer ([facility] [address] [facility]     last assessed 11/14/17     Esophagitis, reflux     Hematuria     Hemorrhoids, internal     Hyperlipidemia     Myalgia     Sebaceous gland disease     [facility]      Past Surgical History:   Procedure Laterality Date    APPENDECTOMY      HAND FRACTURE REPAIR      Open Treatment of Fracture of one Metacarpal Bone    HYSTERECTOMY      KNEE ARTHROSCOPY      Therapeutic    LYMPH NODE BIOPSY      LYMPHADENECTOMY      staging    LYMPHADENECTOMY      Staging    OOPHORECTOMY      PELVIC LAPAROSCOPY      ROBOTIC ASSISTED HYSTERECTOMY      SALPINGOOPHORECTOMY Bilateral     TUBAL LIGATION      WRIST SURGERY      open treatment of fracture of one metacarpal bone     Family History   Problem Relation Age of Onset    Cancer Mother         unknown    Cancer Father     Stomach cancer Father      History   Smoking Status    Never Smoker   Smokeless Tobacco    Never Used     Comment: Denied History of Tobacco use     History   Alcohol Use No      History   Drug Use No       Current Outpatient Prescriptions   Medication Sig Dispense Refill    Cyanocobalamin (VITAMIN B-12) 1000 MCG/15ML LIQD Take 4 tablets by mouth daily      dorzolamide-timolol (COSOPT) 22 3-6 8 MG/ML ophthalmic solution       Ergocalciferol (VITAMIN D2) 2000 units TABS Take 1 tablet by mouth daily      latanoprost (XALATAN) 0 005 % ophthalmic solution Apply 1 drop to eye      meloxicam (MOBIC) 15 mg tablet Take 1 tablet (15 mg total) by mouth daily 30 tablet 1    Multiple Vitamins-Minerals (MULTI-B-PLUS) TABS Take by mouth      tacrolimus (PROTOPIC) 0 1 % ointment       TURMERIC PO Take 500 Units by mouth daily       No current facility-administered medications for this visit  No Known Allergies  Immunization History   Administered Date(s) Administered    Influenza 12/01/2014, 01/05/2016, 01/04/2017, 01/08/2018    Influenza Split High Dose Preservative Free IM 12/01/2014, 01/05/2016, 01/04/2017, 01/08/2018    Influenza TIV (IM) 12/12/2013    Influenza, high dose seasonal 0 5 mL 10/23/2018    Pneumococcal Conjugate 13-Valent 01/05/2016, 02/10/2017    Pneumococcal Polysaccharide PPV23 10/23/2018    Tdap 12/01/2014    Varicella 02/03/2016       Patient Care Team:  Rachel Barnett MD as PCP - General  Petra Morales MD    Medicare Screening Tests and Risk Assessments:  Lana Burroughs is here for her Subsequent Wellness visit  Health Risk Assessment:  Patient rates overall health as good  Patient feels that their physical health rating is Slightly better  Eyesight was rated as Slightly worse  Hearing was rated as Slightly worse  Patient feels that their emotional and mental health rating is Slightly better  Pain experienced by patient in the last 7 days has been A lot  Patient's pain rating has been 8/10  Patient states that she has experienced weight loss or gain in last 6 months  (Additional comments: Lost a little weight)    Emotional/Mental Health:  Patient has been feeling nervous/anxious  PHQ-9 Depression Screening:    Frequency of the following problems over the past two weeks:      1  Little interest or pleasure in doing things: 0 - not at all      2   Feeling down, depressed, or hopeless: 0 - not at all  PHQ-2 Score: 0          Broken Bones/Falls: Fall Risk Assessment:    In the past year, patient has experienced: No history of falling in past year          Bladder/Bowel:  Patient has leaked urine accidently in the last six months  Patient reports loss of bowel control  Immunizations:  Patient has had a flu vaccination within the last year  Patient has received a pneumonia shot  Patient has received a shingles shot  Patient has not received tetanus/diphtheria shot  Home Safety:  Patient has trouble with stairs inside or outside of their home  Patient currently reports that there are no safety hazards present in home, working smoke alarms, working carbon monoxide detectors  Preventative Screenings:   Breast cancer screening performed, colon cancer screen completed, cholesterol screen completed, glaucoma eye exam completed,     Nutrition:  Current diet: Regular with servings of the following:    Medications:  Patient is currently taking over-the-counter supplements  List of OTC medications includes: vitamins  Patient is able to manage medications  Lifestyle Choices:  Patient reports no tobacco use  Patient has not smoked or used tobacco in the past   Patient reports alcohol use  Patient drives a vehicle  Patient wears seat belt  Activities of Daily Living:  Can get out of bed by his or her self, able to dress self, able to make own meals, able to do own shopping, able to bathe self, can do own laundry/housekeeping, can manage own money, pay bills and track expenses    Previous Hospitalizations:  No hospitalization or ED visit in past 12 months        Advanced Directives:  Patient has not decided on power of   Patient has not completed advanced directive          Preventative Screening/Counseling:      Cardiovascular:      General: Screening Current          Diabetes:      General: Screening Current          Colorectal Cancer: General: Screening Current          Breast Cancer:      General: Screening Current          Cervical Cancer:      General: Screening Current      Comments: History of endometrial cancer- follows up regularly        Osteoporosis:      General: Screening Current          AAA:      General: Screening Not Indicated          Glaucoma:      General: Screening Current          HIV:      General: Screening Not Indicated          Hepatitis C:      General: Screening Not Indicated        Advanced Directives:   Patient has no living will for healthcare, does not have durable POA for healthcare, patient does not have an advanced directive  Information on ACP and/or AD provided  5 wishes given  Provider agrees with end of life decisions        Immunizations:  Patient reviewed and up to date

## 2019-01-16 NOTE — PATIENT INSTRUCTIONS
77-year-old patient here today for a wellness visit  Expresses that she has had knee pain for about 30 years any Orthopedics failing multiple insert if that she had a knee replacement  That due to her good health, it is not a bad idea  Up to date on colonoscopy and mammography      Return to office in 6 months or sooner if she elects to do any surgery for clearance

## 2019-01-16 NOTE — PROGRESS NOTES
Assessment/Plan:       Diagnoses and all orders for this visit:    Health care maintenance    Other orders  -     dorzolamide-timolol (COSOPT) 22 3-6 8 MG/ML ophthalmic solution;           Patient Instructions   70year-old patient here today for a wellness visit  Expresses that she has had knee pain for about 30 years any Orthopedics failing multiple insert if that she had a knee replacement  That due to her good health, it is not a bad idea  Up to date on colonoscopy and mammography  Return to office in 6 months or sooner if she elects to do any surgery for clearance        Subjective:      Patient ID: Deirdre Claros is a 70 y o  female  Patient is a 65-year-old female here today for wellness visit  She expresses that she has had left knee pain for 30 years  Furthermore, she expresses that she has cramping in her left leg in her thigh, calf and foot, likely due to compensation from the knee pain  Patient sees an orthopedic doctor in which she has had cortisone shots multiple times which only helped temporarily  She states recently the orthopedic doctor suggested she get a knee replacement  The patient was informed not a bad idea due to her good health and long history of pain  Patient has a visit with the orthopedic doctor in a few weeks  Patient also expressed that she has some ataxia, likely due to chemotherapy for endometrial cancer in her past     Up-to-date on colon in mammography screens  Return to office in 6 months or sooner if need for surgical clearance  The following portions of the patient's history were reviewed and updated as appropriate:   She has a past medical history of Arthritis; Asthma; Benign colon polyp; Effusion of knee; Endometrial cancer ([facility] [address] ); Esophagitis, reflux; Hematuria; Hemorrhoids, internal; Hyperlipidemia; Myalgia; Sebaceous gland disease; and Uterine cancer ([facility] [address] )  ,   does not have any pertinent problems on file  ,   has a past surgical history that includes Appendectomy; Oophorectomy; Lymph node biopsy; Hysterectomy; Laparoscopy; Knee arthroscopy; Robotic assisted hysterectomy; Wrist surgery; Salpingoophorectomy (Bilateral); Lymphadenectomy; Tubal ligation; HAND FRACTURE REPAIR; and Lymphadenectomy  ,  family history includes Cancer in her father and mother; Stomach cancer in her father  ,   reports that she has never smoked  She has never used smokeless tobacco  She reports that she does not drink alcohol or use drugs  ,  has No Known Allergies     Current Outpatient Prescriptions   Medication Sig Dispense Refill    Cyanocobalamin (VITAMIN B-12) 1000 MCG/15ML LIQD Take 4 tablets by mouth daily      dorzolamide-timolol (COSOPT) 22 3-6 8 MG/ML ophthalmic solution       Ergocalciferol (VITAMIN D2) 2000 units TABS Take 1 tablet by mouth daily      latanoprost (XALATAN) 0 005 % ophthalmic solution Apply 1 drop to eye      meloxicam (MOBIC) 15 mg tablet Take 1 tablet (15 mg total) by mouth daily 30 tablet 1    Multiple Vitamins-Minerals (MULTI-B-PLUS) TABS Take by mouth      tacrolimus (PROTOPIC) 0 1 % ointment       TURMERIC PO Take 500 Units by mouth daily       No current facility-administered medications for this visit  Review of Systems   Constitutional: Negative for fatigue  Respiratory: Negative for chest tightness, shortness of breath and wheezing  Cardiovascular: Negative for chest pain, palpitations and leg swelling  Musculoskeletal: Positive for arthralgias  Left knee pain  Left leg, calf and foot cramping   Neurological: Negative for dizziness, weakness, light-headedness and headaches  Ataxia         Objective:  Vitals:    01/16/19 0952   BP: 108/70   Pulse: 67   SpO2: 96%      Physical Exam   Constitutional: She is oriented to person, place, and time  She appears well-developed and well-nourished  Patient is a 53-year-old female in good health  Sitting comfortably on table in no acute distress     HENT:   Head: Normocephalic and atraumatic  Cardiovascular: Normal rate and regular rhythm  Pulmonary/Chest: Effort normal and breath sounds normal    Musculoskeletal: She exhibits no edema or tenderness  Decreased range of motion of the left knee  Pain on internal and external rotation as well as flexion  Left knee synovial thickening  Neurological: She is alert and oriented to person, place, and time  Skin: Skin is warm and dry

## 2019-01-16 NOTE — PROGRESS NOTES
Assessment/Plan:  Assessment/Plan   There are no diagnoses linked to this encounter  Subjective:   Patient ID: Tierney Vazquez is a 70 y o  female    Chief Complaint   Patient presents with    Left Knee - Pain       Knee Pain                 Review of Systems    Objective:  Vitals:    01/16/19 1304   Weight: 88 4 kg (194 lb 12 8 oz)   Height: 5' 2" (1 575 m)     Ortho Exam    Physical Exam    {Imaging Review Statement:[id number]}

## 2019-01-22 ENCOUNTER — OFFICE VISIT (OUTPATIENT)
Dept: LAB | Facility: HOSPITAL | Age: 79
End: 2019-01-22
Attending: ORTHOPAEDIC SURGERY
Payer: COMMERCIAL

## 2019-01-22 ENCOUNTER — HOSPITAL ENCOUNTER (OUTPATIENT)
Dept: RADIOLOGY | Facility: HOSPITAL | Age: 79
Discharge: HOME | End: 2019-01-22
Attending: ORTHOPAEDIC SURGERY
Payer: COMMERCIAL

## 2019-01-22 DIAGNOSIS — M17.12 PRIMARY OSTEOARTHRITIS OF LEFT KNEE: ICD-10-CM

## 2019-01-22 LAB
ABO GROUP BLD: NORMAL
ALBUMIN SERPL BCP-MCNC: 3.9 G/DL (ref 3.5–5)
ALP SERPL-CCNC: 65 U/L (ref 46–116)
ALT SERPL W P-5'-P-CCNC: 23 U/L (ref 12–78)
ANION GAP SERPL CALCULATED.3IONS-SCNC: 7 MMOL/L (ref 4–13)
APTT PPP: 26 SECONDS (ref 26–38)
AST SERPL W P-5'-P-CCNC: 18 U/L (ref 5–45)
BASOPHILS # BLD AUTO: 0.04 THOUSANDS/ΜL (ref 0–0.1)
BASOPHILS NFR BLD AUTO: 1 % (ref 0–1)
BILIRUB SERPL-MCNC: 0.7 MG/DL (ref 0.2–1)
BLD GP AB SCN SERPL QL: NEGATIVE
BUN SERPL-MCNC: 20 MG/DL (ref 5–25)
CALCIUM SERPL-MCNC: 9.2 MG/DL (ref 8.3–10.1)
CHLORIDE SERPL-SCNC: 106 MMOL/L (ref 100–108)
CO2 SERPL-SCNC: 28 MMOL/L (ref 21–32)
CREAT SERPL-MCNC: 0.63 MG/DL (ref 0.6–1.3)
CRP SERPL QL: <3 MG/L
EOSINOPHIL # BLD AUTO: 0.1 THOUSAND/ΜL (ref 0–0.61)
EOSINOPHIL NFR BLD AUTO: 2 % (ref 0–6)
ERYTHROCYTE [DISTWIDTH] IN BLOOD BY AUTOMATED COUNT: 12.6 % (ref 11.6–15.1)
EST. AVERAGE GLUCOSE BLD GHB EST-MCNC: 120 MG/DL
FERRITIN SERPL-MCNC: 83 NG/ML (ref 8–388)
GFR SERPL CREATININE-BSD FRML MDRD: 87 ML/MIN/1.73SQ M
GLUCOSE SERPL-MCNC: 118 MG/DL (ref 65–140)
HBA1C MFR BLD: 5.8 % (ref 4.2–6.3)
HCT VFR BLD AUTO: 40.9 % (ref 34.8–46.1)
HGB BLD-MCNC: 13.5 G/DL (ref 11.5–15.4)
IMM GRANULOCYTES # BLD AUTO: 0.03 THOUSAND/UL (ref 0–0.2)
IMM GRANULOCYTES NFR BLD AUTO: 1 % (ref 0–2)
INR PPP: 1.03 (ref 0.86–1.17)
IRON SATN MFR SERPL: 42 %
IRON SERPL-MCNC: 134 UG/DL (ref 50–170)
LYMPHOCYTES # BLD AUTO: 1.19 THOUSANDS/ΜL (ref 0.6–4.47)
LYMPHOCYTES NFR BLD AUTO: 24 % (ref 14–44)
MCH RBC QN AUTO: 30.7 PG (ref 26.8–34.3)
MCHC RBC AUTO-ENTMCNC: 33 G/DL (ref 31.4–37.4)
MCV RBC AUTO: 93 FL (ref 82–98)
MONOCYTES # BLD AUTO: 0.33 THOUSAND/ΜL (ref 0.17–1.22)
MONOCYTES NFR BLD AUTO: 7 % (ref 4–12)
NEUTROPHILS # BLD AUTO: 3.26 THOUSANDS/ΜL (ref 1.85–7.62)
NEUTS SEG NFR BLD AUTO: 65 % (ref 43–75)
NRBC BLD AUTO-RTO: 0 /100 WBCS
PLATELET # BLD AUTO: 200 THOUSANDS/UL (ref 149–390)
PMV BLD AUTO: 10.5 FL (ref 8.9–12.7)
POTASSIUM SERPL-SCNC: 4.5 MMOL/L (ref 3.5–5.3)
PROT SERPL-MCNC: 7.6 G/DL (ref 6.4–8.2)
PROTHROMBIN TIME: 13.4 SECONDS (ref 11.8–14.2)
RBC # BLD AUTO: 4.4 MILLION/UL (ref 3.81–5.12)
RH BLD: POSITIVE
SODIUM SERPL-SCNC: 141 MMOL/L (ref 136–145)
SPECIMEN EXPIRATION DATE: NORMAL
TIBC SERPL-MCNC: 317 UG/DL (ref 250–450)
WBC # BLD AUTO: 4.95 THOUSAND/UL (ref 4.31–10.16)

## 2019-01-22 PROCEDURE — 86901 BLOOD TYPING SEROLOGIC RH(D): CPT

## 2019-01-22 PROCEDURE — 83036 HEMOGLOBIN GLYCOSYLATED A1C: CPT

## 2019-01-22 PROCEDURE — 36415 COLL VENOUS BLD VENIPUNCTURE: CPT

## 2019-01-22 PROCEDURE — 86900 BLOOD TYPING SEROLOGIC ABO: CPT

## 2019-01-22 PROCEDURE — 82728 ASSAY OF FERRITIN: CPT

## 2019-01-22 PROCEDURE — 83540 ASSAY OF IRON: CPT

## 2019-01-22 PROCEDURE — 71046 X-RAY EXAM CHEST 2 VIEWS: CPT

## 2019-01-22 PROCEDURE — 85730 THROMBOPLASTIN TIME PARTIAL: CPT

## 2019-01-22 PROCEDURE — 85610 PROTHROMBIN TIME: CPT

## 2019-01-22 PROCEDURE — 85025 COMPLETE CBC W/AUTO DIFF WBC: CPT

## 2019-01-22 PROCEDURE — 83550 IRON BINDING TEST: CPT

## 2019-01-22 PROCEDURE — 86850 RBC ANTIBODY SCREEN: CPT

## 2019-01-22 PROCEDURE — 93005 ELECTROCARDIOGRAM TRACING: CPT

## 2019-01-22 PROCEDURE — 86140 C-REACTIVE PROTEIN: CPT

## 2019-01-22 PROCEDURE — 80053 COMPREHEN METABOLIC PANEL: CPT

## 2019-01-22 NOTE — PRE-PROCEDURE INSTRUCTIONS
Pre-Surgery Instructions:   Medication Instructions    Cyanocobalamin (VITAMIN B-12) 1000 MCG/15ML LIQD Patient was instructed by Physician and understands   dorzolamide-timolol (COSOPT) 22 3-6 8 MG/ML ophthalmic solution Patient was instructed by Physician and understands   Ergocalciferol (VITAMIN D2) 2000 units TABS Patient was instructed by Physician and understands   latanoprost (XALATAN) 0 005 % ophthalmic solution Patient was instructed by Physician and understands   Multiple Vitamins-Minerals (MULTI-B-PLUS) TABS Patient was instructed by Physician and understands   tacrolimus (PROTOPIC) 0 1 % ointment Patient was instructed by Physician and understands   TURMERIC PO Patient was instructed by Physician and understands

## 2019-01-23 ENCOUNTER — TELEPHONE (OUTPATIENT)
Dept: INTERNAL MEDICINE CLINIC | Facility: CLINIC | Age: 79
End: 2019-01-23

## 2019-01-23 ENCOUNTER — CONSULT (OUTPATIENT)
Dept: INTERNAL MEDICINE CLINIC | Facility: CLINIC | Age: 79
End: 2019-01-23
Payer: COMMERCIAL

## 2019-01-23 VITALS
DIASTOLIC BLOOD PRESSURE: 62 MMHG | WEIGHT: 194.4 LBS | BODY MASS INDEX: 35.77 KG/M2 | HEART RATE: 65 BPM | SYSTOLIC BLOOD PRESSURE: 108 MMHG | OXYGEN SATURATION: 96 % | TEMPERATURE: 98.3 F | HEIGHT: 62 IN

## 2019-01-23 DIAGNOSIS — Z01.818 PREOPERATIVE CLEARANCE: Primary | ICD-10-CM

## 2019-01-23 DIAGNOSIS — M17.12 PRIMARY OSTEOARTHRITIS OF LEFT KNEE: ICD-10-CM

## 2019-01-23 LAB
ATRIAL RATE: 60 BPM
P AXIS: 38 DEGREES
PR INTERVAL: 102 MS
QRS AXIS: -45 DEGREES
QRSD INTERVAL: 90 MS
QT INTERVAL: 422 MS
QTC INTERVAL: 422 MS
T WAVE AXIS: 22 DEGREES
VENTRICULAR RATE: 60 BPM

## 2019-01-23 PROCEDURE — 99215 OFFICE O/P EST HI 40 MIN: CPT | Performed by: PHYSICIAN ASSISTANT

## 2019-01-23 PROCEDURE — 93010 ELECTROCARDIOGRAM REPORT: CPT | Performed by: INTERNAL MEDICINE

## 2019-01-23 NOTE — TELEPHONE ENCOUNTER
Called hospital EKG wasn't ready yet when done they will fax to central scheduling   I also called central scheduling and they will keep a look out for results

## 2019-01-23 NOTE — PROGRESS NOTES
Assessment/Plan:    Preoperative clearance--Left total knee replacement,  DOS-2/1819, Dr Merlene Fitzgerald  BW is normal, CXR and EKG are pending a final reading  Clearance is pending the results of the CXR and EKG  Addendum:    CXR is clear, EKG is NSR, no changes from EKG from 6/24/2015  I see no contraindication to proceed with the planned surgery  Case d/w Dr Attila Farmer who agrees with the A/P  No problem-specific Assessment & Plan notes found for this encounter  There are no diagnoses linked to this encounter  Subjective:      Patient ID: Zelda Fontaine is a 70 y o  female  Pt comes for preoperative clearance  She will have left total knee replacement on 2/18/19 by Dr Merlene Fitzgerald  She is feeling well and without any acute complaints  She had pre-op testing done: BW, CXR and EKG        The following portions of the patient's history were reviewed and updated as appropriate: allergies, current medications, past medical history, past social history, past surgical history and problem list     Review of Systems   Constitutional: Negative for chills, fatigue and fever  HENT: Negative for congestion, ear pain and sore throat  Respiratory: Negative for cough and shortness of breath  Cardiovascular: Negative for chest pain and palpitations  Gastrointestinal: Negative for abdominal pain, diarrhea and nausea  Musculoskeletal: Positive for arthralgias  Objective:      /62 (BP Location: Left arm, Patient Position: Sitting)   Pulse 65   Temp 98 3 °F (36 8 °C) (Oral)   Ht 5' 2" (1 575 m)   Wt 88 2 kg (194 lb 6 4 oz)   SpO2 96%   BMI 35 56 kg/m²          Physical Exam   Constitutional: She is oriented to person, place, and time  She appears well-developed and well-nourished  HENT:   Head: Normocephalic and atraumatic  Right Ear: External ear normal    Left Ear: External ear normal    Mouth/Throat: Oropharynx is clear and moist  No oropharyngeal exudate     Eyes: Pupils are equal, round, and reactive to light  Neck: Normal range of motion  Cardiovascular: Normal rate, regular rhythm and normal heart sounds  Pulmonary/Chest: Effort normal and breath sounds normal  No respiratory distress  Abdominal: Soft  Bowel sounds are normal  There is no tenderness  Lymphadenopathy:     She has no cervical adenopathy  Neurological: She is alert and oriented to person, place, and time  Psychiatric: She has a normal mood and affect   Her behavior is normal  Judgment normal

## 2019-01-23 NOTE — H&P (VIEW-ONLY)
Assessment/Plan:    Preoperative clearance--Left total knee replacement,  DOS-2/1819, Dr FREY [facility]  BW is normal, CXR and EKG are pending a final reading  Clearance is pending the results of the CXR and EKG  Addendum:    CXR is clear, EKG is NSR, no changes from EKG from 6/24/2015  I see no contraindication to proceed with the planned surgery  Case d/w Dr Cha Jones who agrees with the A/P  No problem-specific Assessment & Plan notes found for this encounter  There are no diagnoses linked to this encounter  Subjective:      Patient ID: Jayson Thompson is a 70 y o  female  Pt comes for preoperative clearance  She will have left total knee replacement on 2/18/19 by [facility]  She is feeling well and without any acute complaints  She had pre-op testing done: BW, CXR and EKG        The following portions of the patient's history were reviewed and updated as appropriate: allergies, current medications, past medical history, past social history, past surgical history and problem list     Review of Systems   Constitutional: Negative for chills, fatigue and fever  HENT: Negative for congestion, ear pain and sore throat  Respiratory: Negative for cough and shortness of breath  Cardiovascular: Negative for chest pain and palpitations  Gastrointestinal: Negative for abdominal pain, diarrhea and nausea  Musculoskeletal: Positive for arthralgias  Objective:      /62 (BP Location: Left arm, Patient Position: Sitting)   Pulse 65   Temp 98 3 °F (36 8 °C) (Oral)   Ht 5' 2" (1 575 m)   Wt 88 2 kg (194 lb 6 4 oz)   SpO2 96%   BMI 35 56 kg/m²          Physical Exam   Constitutional: She is oriented to person, place, and time  She appears well-developed and well-nourished  HENT:   Head: Normocephalic and atraumatic  Right Ear: External ear normal    Left Ear: External ear normal    Mouth/Throat: Oropharynx is clear and moist  No oropharyngeal exudate     Eyes: Pupils are equal, round, and reactive to light  Neck: Normal range of motion  Cardiovascular: Normal rate, regular rhythm and normal heart sounds  Pulmonary/Chest: Effort normal and breath sounds normal  No respiratory distress  Abdominal: Soft  Bowel sounds are normal  There is no tenderness  Lymphadenopathy:     She has no cervical adenopathy  Neurological: She is alert and oriented to person, place, and time  Psychiatric: She has a normal mood and affect   Her behavior is normal  Judgment normal

## 2019-01-30 ENCOUNTER — TELEPHONE (OUTPATIENT)
Dept: OBGYN CLINIC | Facility: HOSPITAL | Age: 79
End: 2019-01-30

## 2019-01-31 NOTE — TELEPHONE ENCOUNTER
Preoperative Elective Admission Assessment       Living Situation: Pt reports living at home for Karel Scott and son [facility] Layout:Pt reports multilevel home, 3 stories                  Steps: 2 to enter the home, 18 to 2nd floor, 10 to the 3rd floor     First Floor Setup: Can stay on the 1st floor with BSC and couch  2nd floor has full bath     Post-op Caregiver: , Floyd    Post-op Transport: , Karel Scott    Outpatient Physical Therapy Site: [facility]    DME: Pt has cane, RW, needs a BSC    Patient's Current Level of Function: Pt reports ambulating with a cane, independent with ADLS     Medication Management: Pt reports self managing medications removing pills from the bottle everyday                      Preferred Pharmacy: Pt reports self managing  Managing medications removing them from the bottle                     Blood Management Vitamins: N/A                    Post-op anticoagulant:Deteremined by surgeon    DC Plan: Home with outpatient pt                      Barriers to DC identified preoperatively:     BMI: 35 63    Caresense: Enrolled on 1/31                    RAPT: 11                    ACE/ARB Form: N/A                    HOOS/KOOS: 58 93    Patient Education:   Pt educated on post op pain, early mobilization (POD0), indication/use of incentive spirometer (10x/hr while awake), and indication for/use of foot/leg pumps  pt encouraged to call me with questions, concerns or issues

## 2019-02-11 ENCOUNTER — EVALUATION (OUTPATIENT)
Dept: PHYSICAL THERAPY | Facility: CLINIC | Age: 79
End: 2019-02-11
Payer: COMMERCIAL

## 2019-02-11 DIAGNOSIS — M17.12 PRIMARY OSTEOARTHRITIS OF LEFT KNEE: Primary | ICD-10-CM

## 2019-02-11 PROCEDURE — 97110 THERAPEUTIC EXERCISES: CPT | Performed by: PHYSICAL THERAPIST

## 2019-02-11 PROCEDURE — 97161 PT EVAL LOW COMPLEX 20 MIN: CPT | Performed by: PHYSICAL THERAPIST

## 2019-02-11 PROCEDURE — G8990 OTHER PT/OT CURRENT STATUS: HCPCS | Performed by: PHYSICAL THERAPIST

## 2019-02-11 PROCEDURE — G8991 OTHER PT/OT GOAL STATUS: HCPCS | Performed by: PHYSICAL THERAPIST

## 2019-02-11 NOTE — PROGRESS NOTES
PT Evaluation     Today's date: 2019  Patient name: Kendra Peralta  : 1940  MRN: [id number]  Referring provider: Lanre Peralta MD  Dx:   Encounter Diagnosis     ICD-10-CM    1  Primary osteoarthritis of left knee M17 12 Ambulatory referral to Physical Therapy                  Assessment  Assessment details: Patient was provided a home exercise program and demonstrated an understanding of exercises  Patient was advised to stop performing home exercise program if symptoms increase or new complaints developed  Verbal understanding demonstrated regarding home exercise program instructions  Patient would benefit from skilled physical therapy services for prescribed exercises, manual interventions, neuromuscular re-education, education, and modalities as deemed appropriate to assist patient in achieving their maximum level of function  Patient seen Pre-op today pending TKA 2019    HEP was reviewed and issued, home checklist for safety was also reviewed and issued  Patient was instructed in HEP to be performed both pre and post surgical until follow up with us  Impairments: abnormal gait, abnormal muscle firing, abnormal or restricted ROM, activity intolerance, impaired physical strength, lacks appropriate home exercise program and pain with function  Understanding of Dx/Px/POC: good  Goals  Independent with HEP - MET pre surgical     POST SURGICAL GOALS  STG   1  Patient will demonstrate independence and competence with HEP 2 -4 weeks  2  Patient will report > 25-50% reduced pain 2-4 weeks      LTG   1  Patient will report improvements with both functional and recreational abilities  4-6 weeks  2  Patient will demonstrate improved motor function  4-6 weeks       Plan  Plan details: Patient response to treatment will be monitored each session and progressed accordingly    Thank you for this referral    Patient would benefit from: skilled physical therapy  Planned modality interventions: cryotherapy  Planned therapy interventions: IADL retraining, patient education, postural training, strengthening, stretching, therapeutic exercise, flexibility, home exercise program and manual therapy  Frequency: 2x week  Duration in weeks: 4  Treatment plan discussed with: patient        Subjective Evaluation    History of Present Illness  Mechanism of injury: Patient reports that she has dealt with left knee pain for 30+ years  She had a meniscal repair at that time and did very well for 10 years  She trialed the "gel shots" as well as cortizone injections for the past several years which no longer seem to help  She took some anti-inflammatories which do help slightly  She has severe arthritis and is ready for TKA  She reports no Right knee pain, but does c/o Right hip pain intermittently  Rehab to right hip has been limited by her left knee pain  Pain  Current pain ratin  At best pain ratin  At worst pain ratin  Quality: sharp  Relieving factors: medications  Aggravating factors: stair climbing, walking and sitting  Progression: worsening    Social Support  Lives in: multiple-level home (bathroom on 2nd floor)  Lives with: spouse, young children and adult children    Hand dominance: right    Treatments  Current treatment: injection treatment and medication  Patient Goals  Patient goals for therapy: decreased pain, increased motion and independence with ADLs/IADLs          Objective     Observations   Left Knee   Positive for deformity  Additional Observation Details  (+) arthritic changes left knee with (+) swelling present    Patient is hypersensitive to palpation left knee Medial > lateral region  Tenderness   Left Knee   Tenderness in the inferior fat pad, lateral joint line, medial joint line, pes anserinus and superior patella       Neurological Testing     Sensation     Knee   Left Knee   Intact: light touch    Right Knee   Intact: light touch     Active Range of Motion Left Knee   Flexion: 115 degrees with pain  Extension: -15 degrees with pain    Right Knee   Flexion: 141 degrees   Extension: 0 degrees     Passive Range of Motion   Left Knee   Flexion: 118 degrees with pain  Extension: -10 degrees with pain    Mobility   Patellar Mobility:   Left Knee   Hypomobile: left medial, left lateral, left superior and left inferior    Strength/Myotome Testing     Left Knee   Flexion: 4+  Extension: 4-  Quadriceps contraction: fair    Right Knee   Normal strength  Quadriceps contraction: good    General Comments:      Knee Comments  GAIT - antalgic, decreased stance time left, , maintains left in ER posturing  Lacks TKE thru swing  TUG  16, 16, 15 seconds - NO assistive device             Precautions: Left TKA 2-     Daily Treatment Diary     Manual              PROM Left knee flexion/ ext                                                                     Exercise Diary  2/11            Qs, gs 3s x 20            Heel slides 10s x 10            AP 20x            laq 3s x 20             SLR / SAQ             BIKE             hss             Supine abd 20 x                                                                                                                                                                             Modalities              ICE  Left knee

## 2019-02-15 ENCOUNTER — TELEPHONE (OUTPATIENT)
Dept: OBGYN CLINIC | Facility: HOSPITAL | Age: 79
End: 2019-02-15

## 2019-02-18 ENCOUNTER — HOSPITAL ENCOUNTER (INPATIENT)
Facility: HOSPITAL | Age: 79
LOS: 1 days | Discharge: HOME | DRG: 470 | End: 2019-02-19
Attending: ORTHOPAEDIC SURGERY | Admitting: ORTHOPAEDIC SURGERY
Payer: COMMERCIAL

## 2019-02-18 ENCOUNTER — APPOINTMENT (OUTPATIENT)
Dept: RADIOLOGY | Facility: HOSPITAL | Age: 79
DRG: 470 | End: 2019-02-18
Payer: COMMERCIAL

## 2019-02-18 ENCOUNTER — ANESTHESIA (OUTPATIENT)
Dept: PERIOP | Facility: HOSPITAL | Age: 79
DRG: 470 | End: 2019-02-18
Payer: COMMERCIAL

## 2019-02-18 ENCOUNTER — ANESTHESIA EVENT (OUTPATIENT)
Dept: PERIOP | Facility: HOSPITAL | Age: 79
DRG: 470 | End: 2019-02-18
Payer: COMMERCIAL

## 2019-02-18 DIAGNOSIS — M17.12 PRIMARY OSTEOARTHRITIS OF LEFT KNEE: Primary | ICD-10-CM

## 2019-02-18 LAB
ABO GROUP BLD: NORMAL
BLD GP AB SCN SERPL QL: NEGATIVE
RH BLD: POSITIVE
SPECIMEN EXPIRATION DATE: NORMAL

## 2019-02-18 PROCEDURE — 86901 BLOOD TYPING SEROLOGIC RH(D): CPT | Performed by: ORTHOPAEDIC SURGERY

## 2019-02-18 PROCEDURE — 27447 TOTAL KNEE ARTHROPLASTY: CPT | Performed by: PHYSICIAN ASSISTANT

## 2019-02-18 PROCEDURE — C1776 JOINT DEVICE (IMPLANTABLE): HCPCS | Performed by: ORTHOPAEDIC SURGERY

## 2019-02-18 PROCEDURE — 86900 BLOOD TYPING SEROLOGIC ABO: CPT | Performed by: ORTHOPAEDIC SURGERY

## 2019-02-18 PROCEDURE — 3E0T3BZ INTRODUCTION OF ANESTHETIC AGENT INTO PERIPHERAL NERVES AND PLEXI, PERCUTANEOUS APPROACH: ICD-10-PCS | Performed by: STUDENT IN AN ORGANIZED HEALTH CARE EDUCATION/TRAINING PROGRAM

## 2019-02-18 PROCEDURE — 97110 THERAPEUTIC EXERCISES: CPT

## 2019-02-18 PROCEDURE — 86850 RBC ANTIBODY SCREEN: CPT | Performed by: ORTHOPAEDIC SURGERY

## 2019-02-18 PROCEDURE — 97163 PT EVAL HIGH COMPLEX 45 MIN: CPT

## 2019-02-18 PROCEDURE — G8978 MOBILITY CURRENT STATUS: HCPCS

## 2019-02-18 PROCEDURE — G8979 MOBILITY GOAL STATUS: HCPCS

## 2019-02-18 PROCEDURE — C1713 ANCHOR/SCREW BN/BN,TIS/BN: HCPCS | Performed by: ORTHOPAEDIC SURGERY

## 2019-02-18 PROCEDURE — 27447 TOTAL KNEE ARTHROPLASTY: CPT | Performed by: ORTHOPAEDIC SURGERY

## 2019-02-18 PROCEDURE — 73560 X-RAY EXAM OF KNEE 1 OR 2: CPT

## 2019-02-18 PROCEDURE — 0SRD0J9 REPLACEMENT OF LEFT KNEE JOINT WITH SYNTHETIC SUBSTITUTE, CEMENTED, OPEN APPROACH: ICD-10-PCS | Performed by: ORTHOPAEDIC SURGERY

## 2019-02-18 DEVICE — ATTUNE PATELLA MEDIALIZED DOME 38MM CEMENTED AOX
Type: IMPLANTABLE DEVICE | Site: KNEE | Status: FUNCTIONAL
Brand: ATTUNE

## 2019-02-18 DEVICE — SMARTSET HIGH PERFORMANCE MV MEDIUM VISCOSITY BONE CEMENT 40G
Type: IMPLANTABLE DEVICE | Site: KNEE | Status: FUNCTIONAL
Brand: SMARTSET

## 2019-02-18 DEVICE — ATTUNE KNEE SYSTEM TIBIAL BASE FIXED BEARING SIZE 5 CEMENTED
Type: IMPLANTABLE DEVICE | Site: KNEE | Status: FUNCTIONAL
Brand: ATTUNE

## 2019-02-18 DEVICE — ATTUNE KNEE SYSTEM FEMORAL POSTERIOR STABILIZED NARROW SIZE 6N LEFT CEMENTED
Type: IMPLANTABLE DEVICE | Site: KNEE | Status: FUNCTIONAL
Brand: ATTUNE

## 2019-02-18 DEVICE — ATTUNE KNEE SYSTEM TIBIAL INSERT FIXED BEARING POSTERIOR STABILIZED 6 5MM AOX
Type: IMPLANTABLE DEVICE | Site: KNEE | Status: FUNCTIONAL
Brand: ATTUNE

## 2019-02-18 RX ORDER — CEFAZOLIN SODIUM 2 G/50ML
2000 SOLUTION INTRAVENOUS EVERY 8 HOURS
Status: COMPLETED | OUTPATIENT
Start: 2019-02-18 | End: 2019-02-19

## 2019-02-18 RX ORDER — MAGNESIUM HYDROXIDE 1200 MG/15ML
LIQUID ORAL AS NEEDED
Status: DISCONTINUED | OUTPATIENT
Start: 2019-02-18 | End: 2019-02-18 | Stop reason: HOSPADM

## 2019-02-18 RX ORDER — PROPOFOL 10 MG/ML
INJECTION, EMULSION INTRAVENOUS CONTINUOUS PRN
Status: DISCONTINUED | OUTPATIENT
Start: 2019-02-18 | End: 2019-02-18 | Stop reason: SURG

## 2019-02-18 RX ORDER — PROPRANOLOL/HYDROCHLOROTHIAZID 40 MG-25MG
TABLET ORAL DAILY
Status: DISCONTINUED | OUTPATIENT
Start: 2019-02-18 | End: 2019-02-19

## 2019-02-18 RX ORDER — SODIUM CHLORIDE, SODIUM LACTATE, POTASSIUM CHLORIDE, CALCIUM CHLORIDE 600; 310; 30; 20 MG/100ML; MG/100ML; MG/100ML; MG/100ML
125 INJECTION, SOLUTION INTRAVENOUS CONTINUOUS
Status: DISCONTINUED | OUTPATIENT
Start: 2019-02-18 | End: 2019-02-19

## 2019-02-18 RX ORDER — GABAPENTIN 100 MG/1
100 CAPSULE ORAL EVERY 8 HOURS SCHEDULED
Status: DISCONTINUED | OUTPATIENT
Start: 2019-02-18 | End: 2019-02-19 | Stop reason: HOSPADM

## 2019-02-18 RX ORDER — ASPIRIN 325 MG
325 TABLET ORAL EVERY 12 HOURS
Status: DISCONTINUED | OUTPATIENT
Start: 2019-02-18 | End: 2019-02-19 | Stop reason: HOSPADM

## 2019-02-18 RX ORDER — ACETAMINOPHEN 325 MG/1
650 TABLET ORAL EVERY 6 HOURS PRN
Status: DISCONTINUED | OUTPATIENT
Start: 2019-02-18 | End: 2019-02-19 | Stop reason: HOSPADM

## 2019-02-18 RX ORDER — ONDANSETRON 2 MG/ML
4 INJECTION INTRAMUSCULAR; INTRAVENOUS ONCE
Status: COMPLETED | OUTPATIENT
Start: 2019-02-18 | End: 2019-02-18

## 2019-02-18 RX ORDER — EPHEDRINE SULFATE 50 MG/ML
INJECTION, SOLUTION INTRAVENOUS AS NEEDED
Status: DISCONTINUED | OUTPATIENT
Start: 2019-02-18 | End: 2019-02-18 | Stop reason: SURG

## 2019-02-18 RX ORDER — CEFAZOLIN SODIUM 2 G/50ML
2000 SOLUTION INTRAVENOUS ONCE
Status: COMPLETED | OUTPATIENT
Start: 2019-02-18 | End: 2019-02-18

## 2019-02-18 RX ORDER — CHLORHEXIDINE GLUCONATE 4 G/100ML
SOLUTION TOPICAL DAILY PRN
Status: DISCONTINUED | OUTPATIENT
Start: 2019-02-18 | End: 2019-02-18 | Stop reason: HOSPADM

## 2019-02-18 RX ORDER — DOCUSATE SODIUM 100 MG/1
100 CAPSULE, LIQUID FILLED ORAL 2 TIMES DAILY
Status: DISCONTINUED | OUTPATIENT
Start: 2019-02-18 | End: 2019-02-19 | Stop reason: HOSPADM

## 2019-02-18 RX ORDER — DORZOLAMIDE HYDROCHLORIDE AND TIMOLOL MALEATE 20; 5 MG/ML; MG/ML
1 SOLUTION/ DROPS OPHTHALMIC EVERY 12 HOURS SCHEDULED
Status: DISCONTINUED | OUTPATIENT
Start: 2019-02-18 | End: 2019-02-19 | Stop reason: HOSPADM

## 2019-02-18 RX ORDER — FENTANYL CITRATE 50 UG/ML
INJECTION, SOLUTION INTRAMUSCULAR; INTRAVENOUS AS NEEDED
Status: DISCONTINUED | OUTPATIENT
Start: 2019-02-18 | End: 2019-02-18 | Stop reason: SURG

## 2019-02-18 RX ORDER — BISACODYL 10 MG
10 SUPPOSITORY, RECTAL RECTAL DAILY PRN
Status: DISCONTINUED | OUTPATIENT
Start: 2019-02-18 | End: 2019-02-19 | Stop reason: HOSPADM

## 2019-02-18 RX ORDER — SODIUM CHLORIDE, SODIUM LACTATE, POTASSIUM CHLORIDE, CALCIUM CHLORIDE 600; 310; 30; 20 MG/100ML; MG/100ML; MG/100ML; MG/100ML
INJECTION, SOLUTION INTRAVENOUS CONTINUOUS PRN
Status: DISCONTINUED | OUTPATIENT
Start: 2019-02-18 | End: 2019-02-18 | Stop reason: SURG

## 2019-02-18 RX ORDER — MELOXICAM 7.5 MG/1
7.5 TABLET ORAL DAILY
Status: DISCONTINUED | OUTPATIENT
Start: 2019-02-18 | End: 2019-02-19 | Stop reason: HOSPADM

## 2019-02-18 RX ORDER — HYDROMORPHONE HCL/PF 1 MG/ML
0.5 SYRINGE (ML) INJECTION
Status: DISCONTINUED | OUTPATIENT
Start: 2019-02-18 | End: 2019-02-18 | Stop reason: HOSPADM

## 2019-02-18 RX ORDER — MIDAZOLAM HYDROCHLORIDE 1 MG/ML
INJECTION INTRAMUSCULAR; INTRAVENOUS AS NEEDED
Status: DISCONTINUED | OUTPATIENT
Start: 2019-02-18 | End: 2019-02-18 | Stop reason: SURG

## 2019-02-18 RX ORDER — FENTANYL CITRATE/PF 50 MCG/ML
50 SYRINGE (ML) INJECTION
Status: DISCONTINUED | OUTPATIENT
Start: 2019-02-18 | End: 2019-02-18 | Stop reason: HOSPADM

## 2019-02-18 RX ORDER — CHLORHEXIDINE GLUCONATE 0.12 MG/ML
15 RINSE ORAL ONCE
Status: COMPLETED | OUTPATIENT
Start: 2019-02-18 | End: 2019-02-18

## 2019-02-18 RX ORDER — MIDAZOLAM HYDROCHLORIDE 1 MG/ML
INJECTION INTRAMUSCULAR; INTRAVENOUS
Status: COMPLETED | OUTPATIENT
Start: 2019-02-18 | End: 2019-02-18

## 2019-02-18 RX ORDER — ROPIVACAINE HYDROCHLORIDE 5 MG/ML
INJECTION, SOLUTION EPIDURAL; INFILTRATION; PERINEURAL
Status: COMPLETED | OUTPATIENT
Start: 2019-02-18 | End: 2019-02-18

## 2019-02-18 RX ORDER — PROPOFOL 10 MG/ML
INJECTION, EMULSION INTRAVENOUS AS NEEDED
Status: DISCONTINUED | OUTPATIENT
Start: 2019-02-18 | End: 2019-02-18 | Stop reason: SURG

## 2019-02-18 RX ORDER — OXYCODONE HYDROCHLORIDE 5 MG/1
5 TABLET ORAL EVERY 4 HOURS PRN
Status: DISCONTINUED | OUTPATIENT
Start: 2019-02-18 | End: 2019-02-19 | Stop reason: HOSPADM

## 2019-02-18 RX ORDER — TRAMADOL HYDROCHLORIDE 50 MG/1
50 TABLET ORAL EVERY 6 HOURS SCHEDULED
Status: DISCONTINUED | OUTPATIENT
Start: 2019-02-18 | End: 2019-02-19 | Stop reason: HOSPADM

## 2019-02-18 RX ORDER — TRANEXAMIC ACID 100 MG/ML
INJECTION, SOLUTION INTRAVENOUS AS NEEDED
Status: DISCONTINUED | OUTPATIENT
Start: 2019-02-18 | End: 2019-02-18 | Stop reason: SURG

## 2019-02-18 RX ORDER — METOCLOPRAMIDE HYDROCHLORIDE 5 MG/ML
10 INJECTION INTRAMUSCULAR; INTRAVENOUS ONCE AS NEEDED
Status: DISCONTINUED | OUTPATIENT
Start: 2019-02-18 | End: 2019-02-18 | Stop reason: HOSPADM

## 2019-02-18 RX ORDER — LATANOPROST 50 UG/ML
1 SOLUTION/ DROPS OPHTHALMIC
Status: DISCONTINUED | OUTPATIENT
Start: 2019-02-18 | End: 2019-02-19 | Stop reason: HOSPADM

## 2019-02-18 RX ADMIN — FENTANYL CITRATE 50 MCG: 50 INJECTION INTRAMUSCULAR; INTRAVENOUS at 12:05

## 2019-02-18 RX ADMIN — SODIUM CHLORIDE, SODIUM LACTATE, POTASSIUM CHLORIDE, AND CALCIUM CHLORIDE: .6; .31; .03; .02 INJECTION, SOLUTION INTRAVENOUS at 10:38

## 2019-02-18 RX ADMIN — FENTANYL CITRATE 50 MCG: 50 INJECTION, SOLUTION INTRAMUSCULAR; INTRAVENOUS at 11:53

## 2019-02-18 RX ADMIN — FENTANYL CITRATE 50 MCG: 50 INJECTION INTRAMUSCULAR; INTRAVENOUS at 12:09

## 2019-02-18 RX ADMIN — SODIUM CHLORIDE, SODIUM LACTATE, POTASSIUM CHLORIDE, AND CALCIUM CHLORIDE: .6; .31; .03; .02 INJECTION, SOLUTION INTRAVENOUS at 08:06

## 2019-02-18 RX ADMIN — ROPIVACAINE HYDROCHLORIDE 30 ML: 5 INJECTION, SOLUTION EPIDURAL; INFILTRATION; PERINEURAL at 08:26

## 2019-02-18 RX ADMIN — FENTANYL CITRATE 50 MCG: 50 INJECTION, SOLUTION INTRAMUSCULAR; INTRAVENOUS at 11:48

## 2019-02-18 RX ADMIN — FENTANYL CITRATE 25 MCG: 50 INJECTION, SOLUTION INTRAMUSCULAR; INTRAVENOUS at 11:51

## 2019-02-18 RX ADMIN — HYDROMORPHONE HYDROCHLORIDE 0.5 MG: 1 INJECTION, SOLUTION INTRAMUSCULAR; INTRAVENOUS; SUBCUTANEOUS at 12:25

## 2019-02-18 RX ADMIN — HYDROMORPHONE HYDROCHLORIDE 0.5 MG: 1 INJECTION, SOLUTION INTRAMUSCULAR; INTRAVENOUS; SUBCUTANEOUS at 12:13

## 2019-02-18 RX ADMIN — FENTANYL CITRATE 25 MCG: 50 INJECTION, SOLUTION INTRAMUSCULAR; INTRAVENOUS at 11:58

## 2019-02-18 RX ADMIN — FENTANYL CITRATE 12.5 MCG: 50 INJECTION, SOLUTION INTRAMUSCULAR; INTRAVENOUS at 10:54

## 2019-02-18 RX ADMIN — CHLORHEXIDINE GLUCONATE 0.12% ORAL RINSE 15 ML: 1.2 LIQUID ORAL at 08:18

## 2019-02-18 RX ADMIN — GABAPENTIN 100 MG: 100 CAPSULE ORAL at 21:12

## 2019-02-18 RX ADMIN — MIDAZOLAM HYDROCHLORIDE 2 MG: 1 INJECTION, SOLUTION INTRAMUSCULAR; INTRAVENOUS at 08:26

## 2019-02-18 RX ADMIN — TRANEXAMIC ACID 1 G: 100 INJECTION, SOLUTION INTRAVENOUS at 11:20

## 2019-02-18 RX ADMIN — GABAPENTIN 100 MG: 100 CAPSULE ORAL at 15:47

## 2019-02-18 RX ADMIN — DOCUSATE SODIUM 100 MG: 100 CAPSULE, LIQUID FILLED ORAL at 17:33

## 2019-02-18 RX ADMIN — ASPIRIN 325 MG: 325 TABLET ORAL at 15:47

## 2019-02-18 RX ADMIN — DORZOLAMIDE HYDROCHLORIDE AND TIMOLOL MALEATE 1 DROP: 20; 5 SOLUTION/ DROPS OPHTHALMIC at 21:30

## 2019-02-18 RX ADMIN — TRAMADOL HYDROCHLORIDE 50 MG: 50 TABLET, FILM COATED ORAL at 17:33

## 2019-02-18 RX ADMIN — SODIUM CHLORIDE, SODIUM LACTATE, POTASSIUM CHLORIDE, AND CALCIUM CHLORIDE 125 ML/HR: .6; .31; .03; .02 INJECTION, SOLUTION INTRAVENOUS at 15:47

## 2019-02-18 RX ADMIN — TRANEXAMIC ACID 1 G: 100 INJECTION, SOLUTION INTRAVENOUS at 09:21

## 2019-02-18 RX ADMIN — Medication 1 TABLET: at 15:47

## 2019-02-18 RX ADMIN — MELOXICAM 7.5 MG: 7.5 TABLET ORAL at 15:48

## 2019-02-18 RX ADMIN — MIDAZOLAM HYDROCHLORIDE 2 MG: 1 INJECTION, SOLUTION INTRAMUSCULAR; INTRAVENOUS at 08:24

## 2019-02-18 RX ADMIN — LATANOPROST 1 DROP: 50 SOLUTION OPHTHALMIC at 21:30

## 2019-02-18 RX ADMIN — PROPOFOL 50 MG: 10 INJECTION, EMULSION INTRAVENOUS at 09:09

## 2019-02-18 RX ADMIN — FENTANYL CITRATE 25 MCG: 50 INJECTION, SOLUTION INTRAMUSCULAR; INTRAVENOUS at 11:35

## 2019-02-18 RX ADMIN — EPHEDRINE SULFATE 5 MG: 50 INJECTION, SOLUTION INTRAMUSCULAR; INTRAVENOUS; SUBCUTANEOUS at 09:56

## 2019-02-18 RX ADMIN — CEFAZOLIN SODIUM 2000 MG: 2 SOLUTION INTRAVENOUS at 17:33

## 2019-02-18 RX ADMIN — PROPOFOL 50 MCG/KG/MIN: 10 INJECTION, EMULSION INTRAVENOUS at 09:09

## 2019-02-18 RX ADMIN — ONDANSETRON 4 MG: 2 INJECTION INTRAMUSCULAR; INTRAVENOUS at 08:15

## 2019-02-18 RX ADMIN — FENTANYL CITRATE 12.5 MCG: 50 INJECTION, SOLUTION INTRAMUSCULAR; INTRAVENOUS at 11:03

## 2019-02-18 RX ADMIN — CEFAZOLIN SODIUM 2000 MG: 2 SOLUTION INTRAVENOUS at 08:57

## 2019-02-18 NOTE — PLAN OF CARE
Problem: Potential for Falls  Goal: Patient will remain free of falls  Description  INTERVENTIONS:  - Assess patient frequently for physical needs  -  Identify cognitive and physical deficits and behaviors that affect risk of falls    -  [place] fall precautions as indicated by assessment   - Educate patient/family on patient safety including physical limitations  - Instruct patient to call for assistance with activity based on assessment  - Modify environment to reduce risk of injury  - Consider OT/PT consult to assist with strengthening/mobility  Outcome: Progressing     Problem: Prexisting or High Potential for Compromised Skin Integrity  Goal: Skin integrity is maintained or improved  Description  INTERVENTIONS:  - Identify patients at risk for skin breakdown  - Assess and monitor skin integrity  - Assess and monitor nutrition and hydration status  - Monitor labs (i e  albumin)  - Assess for incontinence   - Turn and reposition patient  - Assist with mobility/ambulation  - Relieve pressure over bony prominences  - Avoid friction and shearing  - Provide appropriate hygiene as needed including keeping skin clean and dry  - Evaluate need for skin moisturizer/barrier cream  - Collaborate with interdisciplinary team (i e  Nutrition, Rehabilitation, etc )   - Patient/family teaching  Outcome: Progressing     Problem: PAIN - ADULT  Goal: Verbalizes/displays adequate comfort level or baseline comfort level  Description  Interventions:  - Encourage patient to monitor pain and request assistance  - Assess pain using appropriate pain scale  - Administer analgesics based on type and severity of pain and evaluate response  - Implement non-pharmacological measures as appropriate and evaluate response  - Consider cultural and social influences on pain and pain management  - Notify physician/advanced practitioner if interventions unsuccessful or patient reports new pain  Outcome: Progressing     Problem: INFECTION - ADULT  Goal: Absence or prevention of progression during hospitalization  Description  INTERVENTIONS:  - Assess and monitor for signs and symptoms of infection  - Monitor lab/diagnostic results  - Monitor all insertion sites, i e  indwelling lines, tubes, and drains  - Monitor endotracheal (as able) and nasal secretions for changes in amount and color  - [place] appropriate cooling/warming therapies per order  - Administer medications as ordered  - Instruct and encourage patient and family to use good hand hygiene technique  - Identify and instruct in appropriate isolation precautions for identified infection/condition  Outcome: Progressing  Goal: Absence of fever/infection during neutropenic period  Description  INTERVENTIONS:  - Monitor WBC  - Implement neutropenic guidelines  Outcome: Progressing     Problem: SAFETY ADULT  Goal: Patient will remain free of falls  Description  INTERVENTIONS:  - Assess patient frequently for physical needs  -  Identify cognitive and physical deficits and behaviors that affect risk of falls    -  [place] fall precautions as indicated by assessment   - Educate patient/family on patient safety including physical limitations  - Instruct patient to call for assistance with activity based on assessment  - Modify environment to reduce risk of injury  - Consider OT/PT consult to assist with strengthening/mobility  Outcome: Progressing  Goal: Maintain or return to baseline ADL function  Description  INTERVENTIONS:  -  Assess patient's ability to carry out ADLs; assess patient's baseline for ADL function and identify physical deficits which impact ability to perform ADLs (bathing, care of mouth/teeth, toileting, grooming, dressing, etc )  - Assess/evaluate cause of self-care deficits   - Assess range of motion  - Assess patient's mobility; develop plan if impaired  - Assess patient's need for assistive devices and provide as appropriate  - Encourage maximum independence but intervene and supervise when necessary  ¯ Involve family in performance of ADLs  ¯ Assess for home care needs following discharge   ¯ Request OT consult to assist with ADL evaluation and planning for discharge  ¯ Provide patient education as appropriate  Outcome: Progressing  Goal: Maintain or return mobility status to optimal level  Description  INTERVENTIONS:  - Assess patient's baseline mobility status (ambulation, transfers, stairs, etc )    - Identify cognitive and physical deficits and behaviors that affect mobility  - Identify mobility aids required to assist with transfers and/or ambulation (gait belt, sit-to-stand, lift, walker, cane, etc )  - [place] fall precautions as indicated by assessment  - Record patient progress and toleration of activity level on Mobility SBAR; progress patient to next Phase/Stage  - Instruct patient to call for assistance with activity based on assessment  - Request Rehabilitation consult to assist with strengthening/weightbearing, etc   Outcome: Progressing     Problem: DISCHARGE PLANNING  Goal: Discharge to home or other facility with appropriate resources  Description  INTERVENTIONS:  - Identify barriers to discharge w/patient and caregiver  - Arrange for needed discharge resources and transportation as appropriate  - Identify discharge learning needs (meds, wound care, etc )  - Arrange for interpretive services to assist at discharge as needed  - Refer to Case Management Department for coordinating discharge planning if the patient needs post-hospital services based on physician/advanced practitioner order or complex needs related to functional status, cognitive ability, or social support system  Outcome: Progressing     Problem: Knowledge Deficit  Goal: Patient/family/caregiver demonstrates understanding of disease process, treatment plan, medications, and discharge instructions  Description  Complete learning assessment and assess knowledge base    Interventions:  - Provide teaching at level of understanding  - Provide teaching via preferred learning methods  Outcome: Progressing     Problem: MUSCULOSKELETAL - ADULT  Goal: Maintain or return mobility to safest level of function  Description  INTERVENTIONS:  - Assess patient's ability to carry out ADLs; assess patient's baseline for ADL function and identify physical deficits which impact ability to perform ADLs (bathing, care of mouth/teeth, toileting, grooming, dressing, etc )  - Assess/evaluate cause of self-care deficits   - Assess range of motion  - Assess patient's mobility; develop plan if impaired  - Assess patient's need for assistive devices and provide as appropriate  - Encourage maximum independence but intervene and supervise when necessary  - Involve family in performance of ADLs  - Assess for home care needs following discharge   - Request OT consult to assist with ADL evaluation and planning for discharge  - Provide patient education as appropriate  Outcome: Progressing  Goal: Maintain proper alignment of affected body part  Description  INTERVENTIONS:  - Support, maintain and protect limb and body alignment  - Provide pt/fam with appropriate education  Outcome: Progressing

## 2019-02-18 NOTE — OP NOTE
OPERATIVE REPORT    Patient Name: Kendra Peralta    :  1940  MRN: [id number]  Pt Location: [facility] OR ROOM 04    Surgery Date:   2019    Surgeon(s) and Role:     * Leeanna Murray MD - Primary     * Kenny Lozoya PA-C - Assisting     * Sanjeev Jones PA-C - Assisting    Preop Diagnosis:  Primary osteoarthritis of left knee [M17 12]  Post-Op Diagnosis Codes:     * Primary osteoarthritis of left knee [M17 12]  Procedure(s) (LRB):  ARTHROPLASTY KNEE TOTAL (Left)    Specimen(s):  None    Estimated Blood Loss:   Minimal    Drains:  None    Implants:   Implant Name Type Inv  Item Serial No   Lot No  LRB No  Used   CEMENT BONE SMART SET GRAY MED VISC - [id number]  CEMENT BONE SMART SET GRAY MED VISC  DEPUY [id number] Left 1   CEMENT BONE SMART SET GRAY MED VISC - [id number]  CEMENT BONE SMART SET GRAY MED VISC  DEPUY [id number] Left 1   BASEPLATE TIBIAL SZ 5 CMNT FX BRNG ATTUNE S PLUS - [id number]  BASEPLATE TIBIAL SZ 5 CMNT FX BRNG ATTUNE S PLUS  DEPUY [id number] Left 1   COMPONENT FEM SZ 6 LT  CMNT PS ATTUNE - [id number]  COMPONENT FEM SZ 6 LT  CMNT PS ATTUNE  DEPUY [id number] Left 1   COMPONENT PATELLA 38MM MEDIAL DOME ATTUNE - [id number]  COMPONENT PATELLA 38MM MEDIAL DOME ATTUNE  DEPUY [id number] Left 1   INSERT TIBIAL 5MM SZ 6 PS FX BRNG ATTUNE - [id number]  INSERT TIBIAL 5MM SZ 6 PS FX BRNG ATTUNE  DEPUY [id number] Left 1       Anesthesia Type:   Spinal w/ Femoral Nerve Block    Operative Indications:  Primary osteoarthritis of left knee  Severe pain left knee interfering with activities of daily living  Symptoms not responsive to conservative care  Risks and benefits of left total knee arthroplasty discussed in detail with patient  All questions answered  Patient wishes to proceed  Operative Findings:  Severe denudation articular cartilage  Periarticular osteophytes  Three compartment disease although most significant in medial compartment      Complications:   None    Procedure and Technique:  Patient's left knee was marked in the preoperative area  Risks and benefits of surgery discussed and all questions answered  Patient received 2 g IV Ancef  Patient received an adductor canal block by Anesthesia  Patient was taken to the operating room and identified  Spinal anesthesia administered  Patient was placed on the operating room table in the supine position  Tourniquet was placed on patient's left thigh  And patient's left lower extremity was prepped and draped in the usual sterile fashion  Patient received 1 g IV Tranexamic acid  Timeout performed  Left leg was elevated and tourniquet inflated to 300 pounds pressure  Anterior midline incision made over the left knee   Medial parapatellar capsulotomy performed  Osteophytes removed with rongeur  Anterior horns of the medial meniscus and lateral meniscus excised  Anterior cruciate ligament and a portion of the retropatellar fat pad excised  Femoral canal cannulated from a position about 7 millimeters anterior to PCL just medial to the apex of the notch and Whitesides line  The [employer] system distal cutting guide was placed  It was set to take 9 mm (the thickness of the prosthesis)   5 degree valgus setting was utilized  Distal cut was made  We then switched to the tibial cutting guide  The extramedullary guide was aligned along the anatomic axis of the tibia  Cut was made to take 2 mm off the most involved portion of the medial tibial plateau and approximately 8 mm off the lateral plateau  Cut was checked and was in neutral varus valgus  Extension gap was checked and after medial release and osteophyte removal the gap was square and a #5 spacer fit well  We then proceeded with the femoral cuts  3° external rotation setting was utilized  This was corroborated with Whitesides line and the trans-epicondylar axis  The femoral component was sized to a #6 narrow  The sizing guide referenced the posterior condyles and the  anterior cortex of the femur    Pins were placed through the sizing guide preserving the set rotation and anterior/posterior position  Femoral cutting guide placed  The flexion gap was checked after removal of the cutting guides inferior slot attachment  The #5 spacer fit well  Flexion extension gaps were equal   Femoral cutting block pinned in position  The femoral anterior, posterior and chamfer cuts were made  Flexion and extension gaps again checked and noted to be equal  Notch cutting guide was placed  Notch cut made  Femoral trial placed and fit well  Lug holes drilled  A floating trial was made with the #5 spacer and a #5 tibial tray  Patella tracked well  Knee was stable in flexion and extension and mid flexion  Position of the tray was marked  Tibial plateau was then prepared with the tibial tray in appropriate slight external rotation and in alignment with the previously marked point  Tibial plateau drilled and punched  Trial components placed and final trial performed  Knee stable in extension and flexion and had full extension and flexion to 140  The Patella was everted with the leg placed in extension and then cut from 23 mm thickness to 14 mm thickness using the [employer] patellar cutting guide  Size 38 patellar utilized  Lug holes drilled  Trial placed  Knee ranged  Patella tracked well  Knee was stable in flexion mid flexion and extension  Trial Components removed  Wound pulse lavaged  Cut bony surfaces dried  Components cemented in place  Excess cement removed with curette  Knee extended with trial tibial poly in place  After cement hardened trial poly removed and final Size 5 tibial poly was locked onto the tibial tray  Tourniquet deflated  Aquamentis was used for hemostasis  A solution of normal saline, morphine, Keterolac, Celestone and Epinephrine was injected for postop pain management and hemostasis  Wound was irrigated and closed in layers with #2 Fiber Wire and 0 Vicryl for the quadriceps and capsule   0 Vicryl and 2-0 vicryl for subcutaneous layers and a 4-0 Monocryl stitch for skin  Dry sterile dressing was applied  Patient tolerated procedure well  Patient went to recovery room in stable condition  No complications  EBL was less than 100 cc      JUAN MANUEL Frazier and Jeri Martinez were present throughout the case and helped with knee positioning and exposure  I was present for the entire procedure    Patient Disposition:  PACU     SIGNATURE: Mayela Forbes MD  DATE: February 18, 2019  TIME: 11:59 AM      Portions of the record may have been created with voice recognition software   Occasional wrong word or "sound a like" substitutions may have occurred due to the inherent limitations of voice recognition software   Read the chart carefully and recognize, using context, where substitutions have occurred

## 2019-02-18 NOTE — PHYSICAL THERAPY NOTE
Physical Therapy Evaluation     Patient's Name: Brandyn Elizabeth    Admitting Diagnosis  Primary osteoarthritis of left knee [M17 12]    Problem List  Patient Active Problem List   Diagnosis    Endometrial cancer ([facility] [address] )    Age-related osteoporosis without current pathological fracture    Benign colon polyp    Arthritis    Breast screening    Encounter for immunization    Asthma    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    Leg cramp    Health care maintenance    Primary osteoarthritis of left knee       Past Medical History  Past Medical History:   Diagnosis Date    Arthritis     Asthma     Benign colon polyp     Effusion of knee     Endometrial cancer ([facility] [address] )     last assessed 11/14/17     Esophagitis, reflux     Hematuria     Hemorrhoids, internal     Myalgia     Sebaceous gland disease     Uterine cancer ([facility] [address] )        Past Surgical History  Past Surgical History:   Procedure Laterality Date    APPENDECTOMY      HAND FRACTURE REPAIR      Open Treatment of Fracture of one Metacarpal Bone    HYSTERECTOMY      KNEE ARTHROSCOPY      Therapeutic    LYMPH NODE BIOPSY      LYMPHADENECTOMY      staging    LYMPHADENECTOMY      Staging    OOPHORECTOMY      PELVIC LAPAROSCOPY      PA TOTAL KNEE ARTHROPLASTY Left 2/18/2019    Procedure: ARTHROPLASTY KNEE TOTAL;  Surgeon: Maxine Ventura MD;  Location: [facility];  Service: Orthopedics    ROBOTIC ASSISTED HYSTERECTOMY      SALPINGOOPHORECTOMY Bilateral     TUBAL LIGATION      WRIST SURGERY      open treatment of fracture of one metacarpal bone          02/18/19 4751   Note Type   Note type Eval/Treat   Pain Assessment   Pain Assessment No/denies pain  (0/10 pre and post session)   Pain Score No Pain   Pain Type Surgical pain   Pain Location Knee   Pain Orientation Left   Home Living   Type of Home House   Home Layout Multi-level;Bed/bath upstairs;Stairs to enter with rails; Performs ADLs on one level  (6 SHAGUFTA c R handrail, 16 steps up to 2nd floor c R handrail)   Bathroom Shower/Tub Tub/shower unit   Bathroom Toilet Standard   Bathroom Equipment Grab bars in shower  (has plastic chair in the shower)   [address] Walker;Cane  (using the hurry cane prior to surgery  has standard walker)   Additional Comments no bathroom on 1st floor   Prior Function   Level of [place] Independent with ADLs and functional mobility   Lives With Spouse;Son;Family   Receives Help From Family   ADL Assistance Independent   IADLs Independent   Falls in the last 6 months 0  ((+) fall history)   Vocational Retired  (finances)   Comments pt admits to poor balance, has h/o many falls related to such, notes she is more steady with use of cane  (+) driving,  also drives   Restrictions/Precautions   Weight Bearing Precautions Per Order Yes   LLE Weight Bearing Per Order WBAT   Braces or Orthoses   (none per pt)   Other Precautions Chair Alarm; Bed Alarm;WBS;Multiple lines; Fall Risk;Pain   General   Family/Caregiver Present Yes  ()   Cognition   Overall Cognitive Status WFL   Arousal/Participation Alert   Orientation Level Oriented X4   Memory Within functional limits   Following Commands Follows all commands and directions without difficulty   Comments pt agreeable to PT evaluation   RUE Assessment   RUE Assessment WFL   LUE Assessment   LUE Assessment WFL   RLE Assessment   RLE Assessment WFL   LLE Assessment   LLE Assessment X  (grossly 3+/5 c functional mobility, did not formally MMT)   LLE Overall AROM   L Knee Extension -15*  (c towel roll under heel)   L Knee Flexion 60*  (limited 2/2 pain)   Coordination   Movements are Fluid and Coordinated 1   Sensation WFL  (chronic neuropathy)   Light Touch   RLE Light Touch Grossly intact   LLE Light Touch Grossly intact   Bed Mobility   Supine to Sit 4  Minimal assistance   Additional items Assist x 1;HOB elevated; Increased time required;Verbal cues   Additional Comments Educated pt on proper positioning of operated leg/knee, goal is to position towel roll at ankle to prevent pressure injury to heel and promote knee extension  Transfers   Sit to Stand 5  Supervision   Additional items Assist x 1; Armrests; Increased time required;Verbal cues   Stand to Sit 5  Supervision   Additional items Assist x 1; Armrests; Increased time required;Verbal cues   Additional Comments vitals post mobility; 97% SpO2 on RA, 63bpm, 139/80mmHg  vc for sequencing, technique, appropriate hand/foot placement  pt declining any lightheadedness/dizziness   Ambulation/Elevation   Gait pattern Decreased foot clearance;Decreased L stance; Short stride; Step to; Antalgic   Gait Assistance 4  Minimal assist  (CGA)   Additional items Assist x 1; Tactile cues; Verbal cues  (for RW management, sequencing)   Assistive Device Rolling walker   Distance 3' from EOB to recliner   Stair Management Assistance Not tested   Balance   Static Sitting Good   Dynamic Sitting Fair +   Static Standing Fair +   Dynamic Standing Fair   Ambulatory Fair -   Endurance Deficit   Endurance Deficit Yes   Activity Tolerance   Activity Tolerance Patient limited by fatigue   Nurse Made Aware HELEN Reddy verbalized pt appropriate to see, made aware of session outcome/recs   Assessment   Prognosis Good   Problem List Decreased strength;Decreased range of motion;Decreased endurance; Impaired balance;Decreased mobility;Orthopedic restrictions;Pain   Assessment Pt is 70 y o  female seen for PT evaluation on 2/18/2019 s/p [facility] w/ Primary osteoarthritis of left knee  Pt presents for elective L TKA POD 0  PT consulted to assess pt's functional mobility and d/c needs  Pt has trialed OP PT without relief  Per chart review: her L knee pain has been for many years, limping caused her knee pain to flare and subsequently aggravated her R hip and low back   Order placed for PT eval and tx, w/ WBAT L LE and activity beginning POD 0 order  Performed at least 2 patient identifiers during session: Name and wristband  Comorbidities affecting pt's physical performance at time of assessment include: arthritis, asthma, benign colon polyp, effusion of knee, endometrial CA, esophagitis reflux, hematuria, hemorrhoids internal, HLD, myalgia, sebaceous gland disease, uterine CA  PTA, pt was independent w/ all functional mobility w/ "hurrycane", ambulates unrestricted distances and all terrain, has 6 SHAGUFTA, lives w/ spouse in 2 level home (2nd foor set up c 16 steps up) and retired  Personal factors affecting pt at time of IE include: inaccessible home environment, ambulating w/ assistive device, stairs to enter home, inability to ambulate household distances, inability to navigate level surfaces w/o external assistance, positive fall history, decreased initiation and engagement and limited insight into impairments  Please find objective findings from PT assessment regarding body systems outlined above with impairments and limitations including weakness, decreased ROM, impaired balance, decreased endurance, gait deviations, pain, decreased activity tolerance, fall risk and orthopedic restrictions, as well as mobility assessment (need for SBA-CG assist w/ all phases of mobility when usually ambulating independently and need for cueing for mobility technique)  The following objective measures performed on IE also reveal limitations: Barthel Index: 40/100 and Modified Mendez: 3 (moderate disability)  Pt's clinical presentation is currently unstable/unpredictable seen in pt's presentation of need for input for task focus and mobility technique and ongoing medical assessment s/p TKR  Pt to benefit from continued PT tx to address deficits as defined above and maximize level of functional independent mobility and consistency   From PT/mobility standpoint, recommendation at time of d/c would be Home PT with family support vs OP PT pending progress in order to facilitate return to OF  Barriers to Discharge Inaccessible home environment   Goals   Patient Goals to return home and start OP PT and go to [place] vacation in 3 weeks   STG Expiration Date 02/25/19   Short Term Goal #1 In 7 days: Increase L LE strength 1/2 grade to facilitate independent mobility, Perform all bed mobility tasks modified independent to decrease caregiver burden, Perform all transfers modified independent to improve independence, Ambulate > 150 ft  with RW modified independent w/o LOB and w/ normalized gait pattern 100% of the time, Navigate 16 stairs modified independent with unilateral handrail to facilitate return to previous living environment, Increase all balance 1/2 grade to decrease risk for falls, Complete exercise program independently, Tolerate 4 hr OOB to faciliate upright tolerance, Complete % of the time and Increase L knee flexion ROM >  degrees to safely navigate step into home to facilitate independent mobility   Treatment Day 1   Plan   Treatment/Interventions Functional transfer training;LE strengthening/ROM; Elevations; Therapeutic exercise; Endurance training;Patient/family training;Equipment eval/education; Bed mobility;Gait training;Spoke to nursing;Family   PT Frequency Twice a day   Recommendation   Recommendation Home PT; Home with family support   Equipment Recommended Sigrid Pandya; Other (Comment)  (RW, BSC)   PT - OK to Discharge No   Modified Mendez Scale   Modified Dewitt Scale 3   Barthel Index   Feeding 10   Bathing 0   Grooming Score 0   Dressing Score 5   Bladder Score 0   Bowels Score 10   Toilet Use Score 5   Transfers (Bed/Chair) Score 10   Mobility (Level Surface) Score 0   Stairs Score 0   Barthel Index Score 40         Sierra Rome, PT, DPT    Physical Therapy Treatment Note  Time In: 1730  Time Out: 1745  Total Time: 15 min     S:  Pt agreeable to PT treatment session s/p PT eval, in no apparent distress, A&O x 4 and responsive      O:  Pt seen for PT treatment session this date with interventions consisting of Therapeutic exercise consisting of: AROM 10 reps B LE in sitting position  No pain reported throughout exercise reps c exception of mild discomfort during L QS  VC required for technique      A: Pt able to demonstrate initiation of ankle pumps, glute sets, quad sets c tactile cues for technique and quad facilitation  Pt w/ fair carryover for HEP, verbalizing understanding  PT to assess carryover next session  Post session: pt returned back to recliner, chair alarm engaged, all needs in reach and RN notified of session findings/recommendations and NSG staff educated/encouraged to mobilize A x1-2 with RW      P:  Continue to recommend Home PT with family support at time of d/c in order to maximize pt's functional independence and safety w/ mobility  Pt continues to be functioning below baseline level, and remains limited 2* factors listed above  PT will continue to see pt while here in order to address the deficits listed above and provide interventions consistent w/ POC in effort to achieve STGs  Recommend coordination of future sessions with NSG for optimal pain management      Sonal Babb, PT, DPT

## 2019-02-18 NOTE — ANESTHESIA PROCEDURE NOTES
Peripheral Block    Patient location during procedure: holding area  Start time: 2/18/2019 8:22 AM  Reason for block: at surgeon's request and post-op pain management  Staffing  Anesthesiologist: Jaun Maya MD  Performed: anesthesiologist   Preanesthetic Checklist  Completed: patient identified, site marked, surgical consent, pre-op evaluation, timeout performed, IV checked, risks and benefits discussed and monitors and equipment checked  Peripheral Block  Patient position: supine  Prep: ChloraPrep  Patient monitoring: continuous pulse ox, frequent blood pressure checks, heart rate and cardiac monitor  Block type: adductor canal block  Laterality: left  Injection technique: single-shot  Procedures: ultrasound guided  Ultrasound permanent image savedropivacaine (NAROPIN) 0 5 % perineural infiltration, 30 mL  midazolam (VERSED) 2 mg/2 mL IV, 2 mg  Needle  Needle type: Stimuplex   Needle gauge: 21 G  Needle length: 10 cm  Needle localization: anatomical landmarks and ultrasound guidance  Needle insertion depth: 4 cm  Assessment  Injection assessment: incremental injection, local visualized surrounding nerve on ultrasound, no paresthesia on injection and negative aspiration for heme  Paresthesia pain: none  Heart rate change: no  Slow fractionated injection: yes  Post-procedure:  site cleaned  patient tolerated the procedure well with no immediate complications

## 2019-02-18 NOTE — ANESTHESIA PREPROCEDURE EVALUATION
Review of Systems/Medical History  Patient summary reviewed  Chart reviewed  No history of anesthetic complications     Cardiovascular  EKG reviewed, Exercise tolerance (METS): >4,     Pulmonary  No asthma ,        GI/Hepatic    GERD poorly controlled,             Endo/Other    Obesity    GYN       Hematology   Musculoskeletal    Arthritis     Neurology   Psychology           Physical Exam    Airway    Mallampati score: III  TM Distance: >3 FB  Neck ROM: full     Dental   No notable dental hx     Cardiovascular      Pulmonary      Other Findings        Anesthesia Plan  ASA Score- 3     Anesthesia Type- regional, spinal and IV sedation with anesthesia with ASA Monitors  Additional Monitors:   Airway Plan:         Plan Factors-    Induction- intravenous  Postoperative Plan- Plan for postoperative opioid use  Informed Consent- Anesthetic plan and risks discussed with patient  I personally reviewed this patient with the CRNA  Discussed and agreed on the Anesthesia Plan with the CRNA  Ayleen Marquis

## 2019-02-18 NOTE — ANESTHESIA PROCEDURE NOTES
Spinal Block    Patient location during procedure: OR  Start time: 2/18/2019 9:06 AM  Reason for block: procedure for pain and at surgeon's request  Staffing  Anesthesiologist: Yue Hearn MD  Resident/CRNA: Kathy Angela CRNA  Performed: anesthesiologist   Preanesthetic Checklist  Completed: patient identified, site marked, surgical consent, pre-op evaluation, timeout performed, IV checked, risks and benefits discussed and monitors and equipment checked  Spinal Block  Patient position: sitting  Prep: ChloraPrep  Patient monitoring: cardiac monitor and frequent blood pressure checks  Approach: midline  Location: L3-4  Injection technique: single-shot  Needle  Needle type: pencil-tip   Needle gauge: 25 G  Needle length: 10 cm  Assessment  Sensory level: T4  Injection Assessment:  negative aspiration for heme, no paresthesia on injection and positive aspiration for clear CSF    Post-procedure:  adhesive bandage applied, pressure dressing applied, secured with tape, site cleaned and sterile dressing applied

## 2019-02-18 NOTE — PLAN OF CARE
Problem: PHYSICAL THERAPY ADULT  Goal: Performs mobility at highest level of function for planned discharge setting  See evaluation for individualized goals  Description  Treatment/Interventions: Functional transfer training, LE strengthening/ROM, Elevations, Therapeutic exercise, Endurance training, Patient/family training, Equipment eval/education, Bed mobility, Gait training, Spoke to nursing, Family  Equipment Recommended: Trang Eldridge, Other (Comment)(RW, BSC)       See flowsheet documentation for full assessment, interventions and recommendations  Note:   Prognosis: Good  Problem List: Decreased strength, Decreased range of motion, Decreased endurance, Impaired balance, Decreased mobility, Orthopedic restrictions, Pain  Assessment: Pt is 70 y o  female seen for PT evaluation on 2/18/2019 s/p admit to [facility] on 2/18/2019 w/ Primary osteoarthritis of left knee  Pt presents for elective L TKA POD 0  PT consulted to assess pt's functional mobility and d/c needs  Pt has trialed OP PT without relief  Per chart review: her L knee pain has been for many years, limping caused her knee pain to flare and subsequently aggravated her R hip and low back  Order placed for PT eval and tx, w/ WBAT L LE and activity beginning POD 0 order  Performed at least 2 patient identifiers during session: Name and wristband  Comorbidities affecting pt's physical performance at time of assessment include: arthritis, asthma, benign colon polyp, effusion of knee, endometrial CA, esophagitis reflux, hematuria, hemorrhoids internal, HLD, myalgia, sebaceous gland disease, uterine CA  PTA, pt was independent w/ all functional mobility w/ "hurrycane", ambulates unrestricted distances and all terrain, has 6 SHAGUFTA, lives w/ spouse in 2 level home (2nd foor set up c 16 steps up) and retired   Personal factors affecting pt at time of IE include: inaccessible home environment, ambulating w/ assistive device, stairs to enter home, inability to ambulate household distances, inability to navigate level surfaces w/o external assistance, positive fall history, decreased initiation and engagement and limited insight into impairments  Please find objective findings from PT assessment regarding body systems outlined above with impairments and limitations including weakness, decreased ROM, impaired balance, decreased endurance, gait deviations, pain, decreased activity tolerance, fall risk and orthopedic restrictions, as well as mobility assessment (need for SBA-CG assist w/ all phases of mobility when usually ambulating independently and need for cueing for mobility technique)  The following objective measures performed on IE also reveal limitations: Barthel Index: 40/100 and Modified Mendez: 3 (moderate disability)  Pt's clinical presentation is currently unstable/unpredictable seen in pt's presentation of need for input for task focus and mobility technique and ongoing medical assessment s/p TKR  Pt to benefit from continued PT tx to address deficits as defined above and maximize level of functional independent mobility and consistency  From PT/mobility standpoint, recommendation at time of d/c would be Home PT with family support vs OP PT pending progress in order to facilitate return to PLOF  Barriers to Discharge: Inaccessible home environment     Recommendation: Home PT, Home with family support     PT - OK to Discharge: No    See flowsheet documentation for full assessment

## 2019-02-18 NOTE — ANESTHESIA POSTPROCEDURE EVALUATION
Post-Op Assessment Note    CV Status:  Stable  Pain Score: 4    Pain management: adequate     Mental Status:  Alert and awake   Hydration Status:  Euvolemic   PONV Controlled:  Controlled   Airway Patency:  Patent   Post Op Vitals Reviewed: Yes      Staff: CRNA           BP   100/59   Temp   97 3   Pulse  71   Resp   16   SpO2   98%

## 2019-02-19 VITALS
HEIGHT: 62 IN | OXYGEN SATURATION: 97 % | TEMPERATURE: 97.8 F | SYSTOLIC BLOOD PRESSURE: 113 MMHG | WEIGHT: 193.12 LBS | BODY MASS INDEX: 35.54 KG/M2 | RESPIRATION RATE: 18 BRPM | DIASTOLIC BLOOD PRESSURE: 63 MMHG | HEART RATE: 74 BPM

## 2019-02-19 LAB
ANION GAP SERPL CALCULATED.3IONS-SCNC: 12 MMOL/L (ref 4–13)
BUN SERPL-MCNC: 10 MG/DL (ref 5–25)
CALCIUM SERPL-MCNC: 8.2 MG/DL (ref 8.3–10.1)
CHLORIDE SERPL-SCNC: 105 MMOL/L (ref 100–108)
CO2 SERPL-SCNC: 21 MMOL/L (ref 21–32)
CREAT SERPL-MCNC: 0.44 MG/DL (ref 0.6–1.3)
GFR SERPL CREATININE-BSD FRML MDRD: 98 ML/MIN/1.73SQ M
GLUCOSE SERPL-MCNC: 102 MG/DL (ref 65–140)
HCT VFR BLD AUTO: 29.4 % (ref 34.8–46.1)
HGB BLD-MCNC: 9.7 G/DL (ref 11.5–15.4)
POTASSIUM SERPL-SCNC: 4 MMOL/L (ref 3.5–5.3)
SODIUM SERPL-SCNC: 138 MMOL/L (ref 136–145)

## 2019-02-19 PROCEDURE — 97116 GAIT TRAINING THERAPY: CPT

## 2019-02-19 PROCEDURE — 80048 BASIC METABOLIC PNL TOTAL CA: CPT | Performed by: ORTHOPAEDIC SURGERY

## 2019-02-19 PROCEDURE — 85018 HEMOGLOBIN: CPT | Performed by: ORTHOPAEDIC SURGERY

## 2019-02-19 PROCEDURE — 99024 POSTOP FOLLOW-UP VISIT: CPT | Performed by: PHYSICIAN ASSISTANT

## 2019-02-19 PROCEDURE — 97110 THERAPEUTIC EXERCISES: CPT

## 2019-02-19 PROCEDURE — 85014 HEMATOCRIT: CPT | Performed by: ORTHOPAEDIC SURGERY

## 2019-02-19 RX ORDER — DOCUSATE SODIUM 100 MG/1
100 CAPSULE, LIQUID FILLED ORAL 2 TIMES DAILY
Qty: 10 CAPSULE | Refills: 0 | Status: SHIPPED | OUTPATIENT
Start: 2019-02-19 | End: 2019-04-19

## 2019-02-19 RX ORDER — ASPIRIN 325 MG
325 TABLET ORAL EVERY 12 HOURS
Qty: 30 TABLET | Refills: 0 | Status: SHIPPED | OUTPATIENT
Start: 2019-02-19 | End: 2019-04-19

## 2019-02-19 RX ORDER — OXYCODONE HYDROCHLORIDE 5 MG/1
5 TABLET ORAL EVERY 4 HOURS PRN
Qty: 25 TABLET | Refills: 0 | Status: SHIPPED | OUTPATIENT
Start: 2019-02-19 | End: 2019-03-01

## 2019-02-19 RX ADMIN — TRAMADOL HYDROCHLORIDE 50 MG: 50 TABLET, FILM COATED ORAL at 05:00

## 2019-02-19 RX ADMIN — MELOXICAM 7.5 MG: 7.5 TABLET ORAL at 08:24

## 2019-02-19 RX ADMIN — Medication 1 TABLET: at 08:24

## 2019-02-19 RX ADMIN — DOCUSATE SODIUM 100 MG: 100 CAPSULE, LIQUID FILLED ORAL at 08:24

## 2019-02-19 RX ADMIN — GABAPENTIN 100 MG: 100 CAPSULE ORAL at 13:52

## 2019-02-19 RX ADMIN — CEFAZOLIN SODIUM 2000 MG: 2 SOLUTION INTRAVENOUS at 00:06

## 2019-02-19 RX ADMIN — TRAMADOL HYDROCHLORIDE 50 MG: 50 TABLET, FILM COATED ORAL at 12:08

## 2019-02-19 RX ADMIN — SODIUM CHLORIDE, SODIUM LACTATE, POTASSIUM CHLORIDE, AND CALCIUM CHLORIDE 125 ML/HR: .6; .31; .03; .02 INJECTION, SOLUTION INTRAVENOUS at 00:06

## 2019-02-19 RX ADMIN — ASPIRIN 325 MG: 325 TABLET ORAL at 03:46

## 2019-02-19 RX ADMIN — DORZOLAMIDE HYDROCHLORIDE AND TIMOLOL MALEATE 1 DROP: 20; 5 SOLUTION/ DROPS OPHTHALMIC at 08:25

## 2019-02-19 RX ADMIN — GABAPENTIN 100 MG: 100 CAPSULE ORAL at 05:00

## 2019-02-19 RX ADMIN — ASPIRIN 325 MG: 325 TABLET ORAL at 15:46

## 2019-02-19 RX ADMIN — TRAMADOL HYDROCHLORIDE 50 MG: 50 TABLET, FILM COATED ORAL at 00:05

## 2019-02-19 RX ADMIN — ACETAMINOPHEN 650 MG: 325 TABLET, FILM COATED ORAL at 13:52

## 2019-02-19 NOTE — PLAN OF CARE
Problem: PHYSICAL THERAPY ADULT  Goal: Performs mobility at highest level of function for planned discharge setting  See evaluation for individualized goals  Description  Treatment/Interventions: Functional transfer training, LE strengthening/ROM, Elevations, Therapeutic exercise, Endurance training, Patient/family training, Equipment eval/education, Bed mobility, Gait training, Spoke to nursing, Family  Equipment Recommended: Divya Marie, Other (Comment)(RW, BSC)       See flowsheet documentation for full assessment, interventions and recommendations  Outcome: Progressing  Note:   Prognosis: Good  Problem List: Decreased strength, Decreased range of motion, Decreased endurance, Impaired balance, Decreased mobility, Orthopedic restrictions, Pain  Assessment: Pt was found supine in bed to begin session  She was christopher to move her leg since she reported that it felt stiff  Pt was able to perform all bed mobility with S this session  She was ablso able to ambulate increased distances this session with no LOB noted but was very anxious about WB through her involved extremity  After pt education, she developed a smooth gait pattern but was still lacking heel strike  Pt felt fatigue post ambulation and after a 3' rest break, she was able to perform all seated TE as charted  She was given a HEP handout with compliance noted  Performed knee PROM this session able to achieve 90 deg flexion with a tight end feel  She had all needs met, with SCD on and ICE pump connected  She would benefit from continued PT in order to promote safe and functional mobility  Barriers to Discharge: Inaccessible home environment     Recommendation: Home PT, Home with family support     PT - OK to Discharge: No(pending stair trail)    See flowsheet documentation for full assessment

## 2019-02-19 NOTE — UTILIZATION REVIEW
Initial Clinical Review    Age/Sex: 70 y o  female  Surgery Date: 02/18/19  Procedure: Surgeon(s) and Role:     * Kenya Menezes MD - Primary     * Pooja Mayo PA-C - Assisting     * Devon Padilla PA-C - Assisting     Preop Diagnosis:  Primary osteoarthritis of left knee [M17 12]  Post-Op Diagnosis Codes:     * Primary osteoarthritis of left knee [M17 12]  Procedure(s) (LRB):  ARTHROPLASTY KNEE TOTAL (Left)      Anesthesia: SPINAL W FEMORAL NEVER BLOCK    Admission Orders: Date/Time/Statement: 2/18/19 @ 1521   Orders Placed This Encounter   Procedures    Inpatient Admission     Standing Status:   Standing     Number of Occurrences:   1     Order Specific Question:   Admitting Physician     Answer:   Vania Pimentel [[id number]]     Order Specific Question:   Level of Care     Answer:   Med Surg [16]     Order Specific Question:   Estimated length of stay     Answer:   Not Applicable     Vital Signs: /63   Pulse 74   Temp 97 8 °F (36 6 °C) (Oral)   Resp 18   Ht 5' 2" (1 575 m)   Wt 87 6 kg (193 lb 2 oz)   SpO2 97%   BMI 35 32 kg/m²   Diet:        Diet Orders   (From admission, onward)            Start     Ordered    02/18/19 1529  Diet Regular; Regular House  Diet effective now     Question Answer Comment   Diet Type Regular    Regular Regular House    RD to adjust diet per protocol?  Yes        02/18/19 1528        Mobility:   DVT Prophylaxis: SCD  Pain Control:   Pain Medications             meloxicam (MOBIC) 15 mg tablet Take 1 tablet (15 mg total) by mouth daily        [facility]LEY  PT/OT EVAL AND TREAT  ORTHOPEDIC TOTAL JOINT HYPOTENSION PROTOCOL

## 2019-02-19 NOTE — PLAN OF CARE
Problem: DISCHARGE PLANNING - CARE MANAGEMENT  Goal: Discharge to post-acute care or home with appropriate resources  Description  INTERVENTIONS:  - Conduct assessment to determine patient/family and health care team treatment goals, and need for post-acute services based on payer coverage, community resources, and patient preferences, and barriers to discharge  - Address psychosocial, clinical, and financial barriers to discharge as identified in assessment in conjunction with the patient/family and health care team  - Arrange appropriate level of post-acute services according to patient's   needs and preference and payer coverage in collaboration with the physician and health care team  - Communicate with and update the patient/family, physician, and health care team regarding progress on the discharge plan  - Arrange appropriate transportation to post-acute venues   2/19/2019 1443 by Emy Dixon RN  Outcome: Completed  Note:   Pt to be discharged home w/no needs   Aravind Briscoe will transport home  Pt will go to OP/PT for therapy and does not want [facility] services  Is refusing the rolling walker-pt has a regular walker and feels she did fine with this when PT worked with her  She was also able to navigate steps with the walker and feels confident that she will be able to do that at home so she does not want a commode  Was concerned that [phone] texted her that her d/c meds were not covered under insurance  CM contacted [facility] and was told that the only meds not covered were her OTC meds-aspirin and her stool softener

## 2019-02-19 NOTE — PLAN OF CARE
Problem: Potential for Falls  Goal: Patient will remain free of falls  Description  INTERVENTIONS:  - Assess patient frequently for physical needs  -  Identify cognitive and physical deficits and behaviors that affect risk of falls    -  [place] fall precautions as indicated by assessment   - Educate patient/family on patient safety including physical limitations  - Instruct patient to call for assistance with activity based on assessment  - Modify environment to reduce risk of injury  - Consider OT/PT consult to assist with strengthening/mobility  Outcome: Progressing     Problem: Prexisting or High Potential for Compromised Skin Integrity  Goal: Skin integrity is maintained or improved  Description  INTERVENTIONS:  - Identify patients at risk for skin breakdown  - Assess and monitor skin integrity  - Assess and monitor nutrition and hydration status  - Monitor labs (i e  albumin)  - Assess for incontinence   - Turn and reposition patient  - Assist with mobility/ambulation  - Relieve pressure over bony prominences  - Avoid friction and shearing  - Provide appropriate hygiene as needed including keeping skin clean and dry  - Evaluate need for skin moisturizer/barrier cream  - Collaborate with interdisciplinary team (i e  Nutrition, Rehabilitation, etc )   - Patient/family teaching  Outcome: Progressing     Problem: PAIN - ADULT  Goal: Verbalizes/displays adequate comfort level or baseline comfort level  Description  Interventions:  - Encourage patient to monitor pain and request assistance  - Assess pain using appropriate pain scale  - Administer analgesics based on type and severity of pain and evaluate response  - Implement non-pharmacological measures as appropriate and evaluate response  - Consider cultural and social influences on pain and pain management  - Notify physician/advanced practitioner if interventions unsuccessful or patient reports new pain  Outcome: Progressing     Problem: INFECTION - ADULT  Goal: Absence or prevention of progression during hospitalization  Description  INTERVENTIONS:  - Assess and monitor for signs and symptoms of infection  - Monitor lab/diagnostic results  - Monitor all insertion sites, i e  indwelling lines, tubes, and drains  - Monitor endotracheal (as able) and nasal secretions for changes in amount and color  - [place] appropriate cooling/warming therapies per order  - Administer medications as ordered  - Instruct and encourage patient and family to use good hand hygiene technique  - Identify and instruct in appropriate isolation precautions for identified infection/condition  Outcome: Progressing  Goal: Absence of fever/infection during neutropenic period  Description  INTERVENTIONS:  - Monitor WBC  - Implement neutropenic guidelines  Outcome: Progressing     Problem: SAFETY ADULT  Goal: Patient will remain free of falls  Description  INTERVENTIONS:  - Assess patient frequently for physical needs  -  Identify cognitive and physical deficits and behaviors that affect risk of falls    -  [place] fall precautions as indicated by assessment   - Educate patient/family on patient safety including physical limitations  - Instruct patient to call for assistance with activity based on assessment  - Modify environment to reduce risk of injury  - Consider OT/PT consult to assist with strengthening/mobility  Outcome: Progressing  Goal: Maintain or return to baseline ADL function  Description  INTERVENTIONS:  -  Assess patient's ability to carry out ADLs; assess patient's baseline for ADL function and identify physical deficits which impact ability to perform ADLs (bathing, care of mouth/teeth, toileting, grooming, dressing, etc )  - Assess/evaluate cause of self-care deficits   - Assess range of motion  - Assess patient's mobility; develop plan if impaired  - Assess patient's need for assistive devices and provide as appropriate  - Encourage maximum independence but intervene and supervise when necessary  ¯ Involve family in performance of ADLs  ¯ Assess for home care needs following discharge   ¯ Request OT consult to assist with ADL evaluation and planning for discharge  ¯ Provide patient education as appropriate  Outcome: Progressing  Goal: Maintain or return mobility status to optimal level  Description  INTERVENTIONS:  - Assess patient's baseline mobility status (ambulation, transfers, stairs, etc )    - Identify cognitive and physical deficits and behaviors that affect mobility  - Identify mobility aids required to assist with transfers and/or ambulation (gait belt, sit-to-stand, lift, walker, cane, etc )  - [place] fall precautions as indicated by assessment  - Record patient progress and toleration of activity level on Mobility SBAR; progress patient to next Phase/Stage  - Instruct patient to call for assistance with activity based on assessment  - Request Rehabilitation consult to assist with strengthening/weightbearing, etc   Outcome: Progressing     Problem: DISCHARGE PLANNING  Goal: Discharge to home or other facility with appropriate resources  Description  INTERVENTIONS:  - Identify barriers to discharge w/patient and caregiver  - Arrange for needed discharge resources and transportation as appropriate  - Identify discharge learning needs (meds, wound care, etc )  - Arrange for interpretive services to assist at discharge as needed  - Refer to Case Management Department for coordinating discharge planning if the patient needs post-hospital services based on physician/advanced practitioner order or complex needs related to functional status, cognitive ability, or social support system  Outcome: Progressing     Problem: Knowledge Deficit  Goal: Patient/family/caregiver demonstrates understanding of disease process, treatment plan, medications, and discharge instructions  Description  Complete learning assessment and assess knowledge base    Interventions:  - Provide teaching at level of understanding  - Provide teaching via preferred learning methods  Outcome: Progressing     Problem: MUSCULOSKELETAL - ADULT  Goal: Maintain or return mobility to safest level of function  Description  INTERVENTIONS:  - Assess patient's ability to carry out ADLs; assess patient's baseline for ADL function and identify physical deficits which impact ability to perform ADLs (bathing, care of mouth/teeth, toileting, grooming, dressing, etc )  - Assess/evaluate cause of self-care deficits   - Assess range of motion  - Assess patient's mobility; develop plan if impaired  - Assess patient's need for assistive devices and provide as appropriate  - Encourage maximum independence but intervene and supervise when necessary  - Involve family in performance of ADLs  - Assess for home care needs following discharge   - Request OT consult to assist with ADL evaluation and planning for discharge  - Provide patient education as appropriate  Outcome: Progressing  Goal: Maintain proper alignment of affected body part  Description  INTERVENTIONS:  - Support, maintain and protect limb and body alignment  - Provide pt/fam with appropriate education  Outcome: Progressing     Problem: DISCHARGE PLANNING - CARE MANAGEMENT  Goal: Discharge to post-acute care or home with appropriate resources  Description  INTERVENTIONS:  - Conduct assessment to determine patient/family and health care team treatment goals, and need for post-acute services based on payer coverage, community resources, and patient preferences, and barriers to discharge  - Address psychosocial, clinical, and financial barriers to discharge as identified in assessment in conjunction with the patient/family and health care team  - Arrange appropriate level of post-acute services according to patient?s   needs and preference and payer coverage in collaboration with the physician and health care team  - Communicate with and update the patient/family, physician, and health care team regarding progress on the discharge plan  - Arrange appropriate transportation to post-acute venues  Outcome: Progressing

## 2019-02-19 NOTE — SOCIAL WORK
[address] from [facility] has the rxs for commode and rolling walker, but pt again refuses this-does not feel she will need them  [address] [place] [address] Street will continue to hold on to the rxs for a few days in case pt changes her mind

## 2019-02-19 NOTE — PLAN OF CARE
Problem: DISCHARGE PLANNING - CARE MANAGEMENT  Goal: Discharge to post-acute care or home with appropriate resources  Description  INTERVENTIONS:  - Conduct assessment to determine patient/family and health care team treatment goals, and need for post-acute services based on payer coverage, community resources, and patient preferences, and barriers to discharge  - Address psychosocial, clinical, and financial barriers to discharge as identified in assessment in conjunction with the patient/family and health care team  - Arrange appropriate level of post-acute services according to patient?s   needs and preference and payer coverage in collaboration with the physician and health care team  - Communicate with and update the patient/family, physician, and health care team regarding progress on the discharge plan  - Arrange appropriate transportation to post-acute venues  Outcome: Progressing  Note:   CM met with pt at bedside  Pt lives with her  Yulisa Angelo and her son in a 3 story house with 16 steps w/railing to reach her bedroom and 5 SHAG[address] w/railing  Pt is able to navigate steps and is independent with ADL's  She uses a cane to ambulate and has a walker to use prn  She has never been in rehab  [facility] sent out a nurse to teach her Lovenox injections following surgery in the past, but she has never actually used [facility] services  Dr Suman Glynn is her PCP  Denies substance abuse, tobacco abuse, or mental health issues  She uses [facility]  [address] and has no problem with her co-pays  She does not have an Advanced Directive or POA, but already has info to review  She is retired, but still drives  Her  will transport home when she is medically cleared  CM discussed d/c needs including [facility] services, but pt is planning on going to OP/PT on d/c and does not feel [facility] services are needed  CM will await PT recommendations and will follow through hospitalization      CM reviewed discharge planning process including the following: identifying help at home, patient preference for discharge planning needs, pharmacy preference, and availability of treatment team to discuss questions or concerns patient and/or family may have regarding understanding medications and recognizing signs and symptoms once discharged  CM also encouraged patient to follow up with all recommended appointments after discharge  Patient advised of importance for patient and family to participate in managing patient?s medical well being  CM name and role reviewed  Discharge Checklist reviewed and CM will continue to monitor for progress toward discharge goals in nursing and provider rounds

## 2019-02-19 NOTE — SOCIAL WORK
CM met with pt at bedside  Pt lives with her  Karel Scott and her son in a 3 story house with 16 steps w/railing to reach her bedroom and 5 [address] w/railing  Pt is able to navigate steps and is independent with ADL's  She uses a cane to ambulate and has a walker to use prn  She has never been in rehab  Bharati sent out a nurse to teach her Lovenox injections following surgery in the past, but she has never actually used [facility] services  Dr Nanette Dewitt is her PCP  Denies substance abuse, tobacco abuse, or mental health issues  She uses [facility]  [address] and has no problem with her co-pays  She does not have an Advanced Directive or POA, but already has info to review  She is retired, but still drives  Her  will transport home when she is medically cleared  CM discussed d/c needs including [facility] services, but pt is planning on going to OP/PT on d/c and does not feel [facility] services are needed  CM will await PT recommendations and will follow through hospitalization  CM reviewed discharge planning process including the following: identifying help at home, patient preference for discharge planning needs, pharmacy preference, and availability of treatment team to discuss questions or concerns patient and/or family may have regarding understanding medications and recognizing signs and symptoms once discharged  CM also encouraged patient to follow up with all recommended appointments after discharge  Patient advised of importance for patient and family to participate in managing patients medical well being  CM name and role reviewed  Discharge Checklist reviewed and CM will continue to monitor for progress toward discharge goals in nursing and provider rounds

## 2019-02-19 NOTE — PLAN OF CARE
Problem: DISCHARGE PLANNING - CARE MANAGEMENT  Goal: Discharge to post-acute care or home with appropriate resources  Description  INTERVENTIONS:  - Conduct assessment to determine patient/family and health care team treatment goals, and need for post-acute services based on payer coverage, community resources, and patient preferences, and barriers to discharge  - Address psychosocial, clinical, and financial barriers to discharge as identified in assessment in conjunction with the patient/family and health care team  - Arrange appropriate level of post-acute services according to patient?s   needs and preference and payer coverage in collaboration with the physician and health care team  - Communicate with and update the patient/family, physician, and health care team regarding progress on the discharge plan  - Arrange appropriate transportation to post-acute venues  2/19/2019 1313 by Monika Amezcua RN  Outcome: Progressing  Note:   Notified Seth ellis of need of rolling walker and commode

## 2019-02-19 NOTE — DISCHARGE SUMMARY
ORTHOPEDICS DISCHARGE SUMMARY  Melinda Aguilera 70 y o  female MRN: [id number]  Unit/Bed#: -01    Attending Physician:  Dr Hellen Rodríguez    Admitting diagnosis: Primary osteoarthritis of left knee [M17 12]    Discharge diagnosis: Primary osteoarthritis of left knee [M17 12]    Date of admission: 2/18/2019    Date of discharge: 02/19/19         Procedure:  Left total knee arthroplasty    HPI:  This is a 70y o  year old female that presented to the office with signs and symptoms of left knee osteoarthritis  They tried and failed conservative treatment measures and wished to proceed with surgical intervention  The risks, benefits, and complications of the procedure were discussed with the patient and informed consent was obtained  Hospital Course: The patient was admitted to the hospital on 2/18/2019 and underwent an uncomplicated left total knee arthroplasty  They were transferred to the floor after a brief stay in the post-anesthesia care unit  Their pain was well managed with IV and oral pain medications  They began therapy on post operative day #1  Aspirin 325 mg twice a day was also started for DVT prophylaxis post operative day #1  On discharge date pt was cleared by PT and the medicine team and determined to be safe for discharge  Daily discussion was had with the patient, nursing staff, orthopaedic team, and family members if present  All questions were answered to the patients satisifaction  0   Lab Value Date/Time    HGB 9 7 (L) 02/19/2019 0450    HGB 13 5 01/22/2019 1314    HGB 13 2 10/18/2018 0842    HGB 13 8 01/08/2018 1104    HGB 13 8 01/11/2017 0918    HGB 12 2 11/20/2015 0823    HGB 12 6 10/30/2015 0847    HGB 13 6 09/18/2015 0833    HGB 14 0 08/28/2015 0832    HGB 12 2 07/07/2015 0521    HGB 13 6 12/03/2014 0947           Discharge Instructions: The patient was discharged weight bearing as tolerated to the left lower extremity  Aspirin 325 mg twice a day will be continued for 28 days  Continue PT/OT  Take pain medications as instructed  Discharge Medications: For the complete list of discharge medications, please refer to the patient's medication reconciliation

## 2019-02-19 NOTE — PHYSICAL THERAPY NOTE
Physical Therapy Progress Note     19 0915   Pain Assessment   Pain Assessment No/denies pain   Pain Score No Pain  (10 with ambulation)   Restrictions/Precautions   Weight Bearing Precautions Per Order Yes   LLE Weight Bearing Per Order WBAT   Other Precautions Chair Alarm; Bed Alarm;WBS;Fall Risk;Pain  (pt taken off iV by NSG)   General   Response to Previous Treatment Patient with no complaints from previous session  Family/Caregiver Present No   Cognition   Overall Cognitive Status WFL   Arousal/Participation Alert   Attention Within functional limits   Orientation Level Oriented X4   Memory Within functional limits   Following Commands Follows all commands and directions without difficulty   Comments Pt was identified by name and , agreeable to treatment  Bed Mobility   Supine to Sit 5  Supervision   Additional items Assist x 1;HOB elevated; Bedrails; Increased time required;Verbal cues;LE management  (RLE assist LLE)   Transfers   Sit to Stand 5  Supervision   Additional items Assist x 1; Armrests; Increased time required;Verbal cues   Stand to Sit 5  Supervision   Additional items Assist x 1; Armrests; Increased time required;Verbal cues   Ambulation/Elevation   Gait pattern Decreased foot clearance; Step to;Short stride; Antalgic;Decreased L stance   Gait Assistance 4  Minimal assist  (CGA)   Additional items Assist x 1;Verbal cues   Assistive Device Rolling walker   Distance 70'x2   Stair Management Assistance Not tested  (Will attempt later today)   Balance   Static Sitting Good   Dynamic Sitting Fair +   Static Standing Fair +   Dynamic Standing Fair   Ambulatory Fair -   Endurance Deficit   Endurance Deficit Yes   Endurance Deficit Description pain with ambulation   Activity Tolerance   Activity Tolerance Patient limited by fatigue   Nurse Made Aware yes, ok to see   Exercises   TKR Sitting;20 reps;AROM; Bilateral   Assessment   Prognosis Good   Problem List Decreased strength;Decreased range of motion;Decreased endurance; Impaired balance;Decreased mobility;Orthopedic restrictions;Pain   Assessment Pt was found supine in bed to begin session  She was christopher to move her leg since she reported that it felt stiff  Pt was able to perform all bed mobility with S this session  She was ablso able to ambulate increased distances this session with no LOB noted but was very anxious about WB through her involved extremity  After pt education, she developed a smooth gait pattern but was still lacking heel strike  Pt felt fatigue post ambulation and after a 3' rest break, she was able to perform all seated TE as charted  She was given a HEP handout with compliance noted  Performed knee PROM this session able to achieve 90 deg flexion with a tight end feel  She had all needs met, with SCD on and ICE pump connected  She would benefit from continued PT in order to promote safe and functional mobility  Barriers to Discharge Inaccessible home environment   Goals   Patient Goals To be able to get up and move around   STG Expiration Date 02/25/19   Short Term Goal #1 As per PT 2/28:  In 7 days: Increase L LE strength 1/2 grade to facilitate independent mobility, Perform all bed mobility tasks modified independent to decrease caregiver burden, Perform all transfers modified independent to improve independence, Ambulate > 150 ft  with RW modified independent w/o LOB and w/ normalized gait pattern 100% of the time, Navigate 16 stairs modified independent with unilateral handrail to facilitate return to previous living environment, Increase all balance 1/2 grade to decrease risk for falls, Complete exercise program independently, Tolerate 4 hr OOB to faciliate upright tolerance, Complete % of the time and Increase L knee flexion ROM >  degrees to safely navigate step into home to facilitate independent mobility   Treatment Day 2   Plan   Treatment/Interventions Functional transfer training;LE strengthening/ROM; Elevations; Therapeutic exercise; Endurance training;Patient/family training;Equipment eval/education; Bed mobility;Gait training;Spoke to nursing;Family   PT Frequency Twice a day   Recommendation   Recommendation Home PT; Home with family support   Equipment Recommended Walker   PT - OK to Discharge No  (pending stair trail)     Lesley Cox, PTA

## 2019-02-19 NOTE — DISCHARGE INSTRUCTIONS
Discharge Instructions - Orthopedics  Danielle Roper 70 y o  female MRN: [id number]  Unit/Bed#: -01    Weight Bearing Status:                                           Weight Bearing as tolerated to the left lower extremity  DVT prophylaxis:  Aspirin 325mg twice a day for 30 days  Pain:  Continue analgesics as directed    Showering Instructions:   Do not shower until 2 days    Dressing Instructions:   Keep dressing clean, dry and intact until follow up appointment  Driving Instructions:  No driving until cleared by Orthopaedic Surgery  PT/OT:  Continue PT/OT as outpatient     Appt Instructions: If you do not have your appointment, please call the clinic at [phone]  Otherwise followup as scheduled below:      Contact the office sooner if you experience any increased numbness/tingling in the extremities        Miscellaneous:  None

## 2019-02-19 NOTE — PLAN OF CARE
Problem: PHYSICAL THERAPY ADULT  Goal: Performs mobility at highest level of function for planned discharge setting  See evaluation for individualized goals  Description  Treatment/Interventions: Functional transfer training, LE strengthening/ROM, Elevations, Therapeutic exercise, Endurance training, Patient/family training, Equipment eval/education, Bed mobility, Gait training, Spoke to nursing, Family  Equipment Recommended: CheckiO, Other (Comment)(RW, BSC)       See flowsheet documentation for full assessment, interventions and recommendations  2/19/2019 1315 by Rena Chaudhari PTA  Outcome: Progressing  Note:   Prognosis: Good  Problem List: Decreased strength, Decreased range of motion, Decreased endurance, Impaired balance, Decreased mobility, Orthopedic restrictions, Pain  Assessment: Pt was found seated in bedside chair  She reported pain from the AM session but felt pretty good overall  She was able to perform stairs this session with unilateral HR on the R using 2 hands  She had a tendency to pull herself up but demonstrated safe functional form overall with sequencing  She was able to ambulate increased distances as well this visit compared to the morning session with no increased pain  Pt was able to give feedback on her HEP and performed each with less verbal instruction needed  She had all needs met and call bell in reach  Pt would benefit from continued PT in order to promote safe and functional mobility  Barriers to Discharge: Inaccessible home environment     Recommendation: Home PT, Home with family support     PT - OK to Discharge: Yes(when medically cleared)    See flowsheet documentation for full assessment       2/19/2019 0940 by Rena Chaudhari PTA  Outcome: Progressing  Note:   Prognosis: Good  Problem List: Decreased strength, Decreased range of motion, Decreased endurance, Impaired balance, Decreased mobility, Orthopedic restrictions, Pain  Assessment: Pt was found supine in bed to begin session  She was christopher to move her leg since she reported that it felt stiff  Pt was able to perform all bed mobility with S this session  She was ablso able to ambulate increased distances this session with no LOB noted but was very anxious about WB through her involved extremity  After pt education, she developed a smooth gait pattern but was still lacking heel strike  Pt felt fatigue post ambulation and after a 3' rest break, she was able to perform all seated TE as charted  She was given a HEP handout with compliance noted  Performed knee PROM this session able to achieve 90 deg flexion with a tight end feel  She had all needs met, with SCD on and ICE pump connected  She would benefit from continued PT in order to promote safe and functional mobility  Barriers to Discharge: Inaccessible home environment     Recommendation: Home PT, Home with family support     PT - OK to Discharge: No(pending stair trail)    See flowsheet documentation for full assessment

## 2019-02-19 NOTE — PHYSICAL THERAPY NOTE
Physical Therapy Progress Note     19 1241   Pain Assessment   Pain Assessment 0-10   Pain Score 7   Pain Type Surgical pain   Pain Location Knee   Pain Orientation Left   Restrictions/Precautions   Weight Bearing Precautions Per Order Yes   LLE Weight Bearing Per Order WBAT   Other Precautions WBS; Chair Alarm; Fall Risk;Pain   General   Response to Previous Treatment Patient with no complaints from previous session  Family/Caregiver Present No   Cognition   Overall Cognitive Status WFL   Arousal/Participation Alert   Attention Within functional limits   Orientation Level Oriented X4   Memory Within functional limits   Following Commands Follows all commands and directions without difficulty   Comments Pt was identified by name and , agreeable to treatment  Transfers   Sit to Stand 5  Supervision   Additional items Assist x 1; Armrests; Increased time required;Verbal cues   Stand to Sit 5  Supervision   Additional items Assist x 1; Armrests; Increased time required;Verbal cues   Ambulation/Elevation   Gait pattern Decreased foot clearance; Step to;Short stride; Antalgic;Decreased L stance   Gait Assistance 4  Minimal assist  (CGA)   Additional items Assist x 1;Verbal cues   Assistive Device Rolling walker   Distance 150'x1   Stair Management Assistance 4  Minimal assist   Additional items Assist x 1;Verbal cues; Increased time required   Stair Management Technique One rail R;Step to pattern; Foreward   Number of Stairs 16   Balance   Static Sitting Good   Dynamic Sitting Fair +   Static Standing Fair +   Dynamic Standing Fair   Ambulatory Fair -   Endurance Deficit   Endurance Deficit Yes   Endurance Deficit Description pain with WB   Activity Tolerance   Activity Tolerance Patient limited by fatigue;Patient limited by pain   Nurse Made Aware yes, ok to see   Exercises   TKR Sitting;20 reps;AROM; Bilateral   Assessment   Prognosis Good   Problem List Decreased strength;Decreased range of motion;Decreased endurance; Impaired balance;Decreased mobility;Orthopedic restrictions;Pain   Assessment Pt was found seated in bedside chair  She reported pain from the AM session but felt pretty good overall  She was able to perform stairs this session with unilateral HR on the R using 2 hands  She had a tendency to pull herself up but demonstrated safe functional form overall with sequencing  She was able to ambulate increased distances as well this visit compared to the morning session with no increased pain  Pt was able to give feedback on her HEP and performed each with less verbal instruction needed  She had all needs met and call bell in reach  Pt would benefit from continued PT in order to promote safe and functional mobility  Goals   Patient Goals To return home   STG Expiration Date 02/25/19   Short Term Goal #1 As per PT 2/28: In 7 days: Increase L LE strength 1/2 grade to facilitate independent mobility, Perform all bed mobility tasks modified independent to decrease caregiver burden, Perform all transfers modified independent to improve independence, Ambulate > 150 ft  with RW modified independent w/o LOB and w/ normalized gait pattern 100% of the time, Navigate 16 stairs modified independent with unilateral handrail to facilitate return to previous living environment, Increase all balance 1/2 grade to decrease risk for falls, Complete exercise program independently, Tolerate 4 hr OOB to faciliate upright tolerance, Complete % of the time and Increase L knee flexion ROM >  degrees to safely navigate step into home to facilitate independent mobility  Treatment Day 2  (2nd visit on 2nd day)   Plan   Treatment/Interventions Functional transfer training;LE strengthening/ROM; Elevations; Therapeutic exercise; Endurance training;Patient/family training;Equipment eval/education; Bed mobility;Gait training;Spoke to nursing;Family   PT Frequency Twice a day   Recommendation   Recommendation Home PT; Home with family support   Equipment Recommended Walker   PT - OK to Discharge Yes  (when medically cleared)     Shanon Rowland, PTA

## 2019-02-19 NOTE — SOCIAL WORK
Pt to be discharged home w/no needs   Alexis Mckeon will transport home  Pt will go to OP/PT for therapy and does not want [facility] services  Is refusing the rolling walker-pt has a regular walker and feels she did fine with this when PT worked with her  She was also able to navigate steps with the walker and feels confident that she will be able to do that at home so she does not want a commode  Was concerned that [phone] texted her that her d/c meds were not covered under insurance  CM contacted [facility] and was told that the only meds not covered were her OTC meds-aspirin and her stool softener

## 2019-02-19 NOTE — PLAN OF CARE
Problem: Potential for Falls  Goal: Patient will remain free of falls  Description  INTERVENTIONS:  - Assess patient frequently for physical needs  -  Identify cognitive and physical deficits and behaviors that affect risk of falls    -  [place] fall precautions as indicated by assessment   - Educate patient/family on patient safety including physical limitations  - Instruct patient to call for assistance with activity based on assessment  - Modify environment to reduce risk of injury  - Consider OT/PT consult to assist with strengthening/mobility  Outcome: Progressing     Problem: Prexisting or High Potential for Compromised Skin Integrity  Goal: Skin integrity is maintained or improved  Description  INTERVENTIONS:  - Identify patients at risk for skin breakdown  - Assess and monitor skin integrity  - Assess and monitor nutrition and hydration status  - Monitor labs (i e  albumin)  - Assess for incontinence   - Turn and reposition patient  - Assist with mobility/ambulation  - Relieve pressure over bony prominences  - Avoid friction and shearing  - Provide appropriate hygiene as needed including keeping skin clean and dry  - Evaluate need for skin moisturizer/barrier cream  - Collaborate with interdisciplinary team (i e  Nutrition, Rehabilitation, etc )   - Patient/family teaching  Outcome: Progressing     Problem: PAIN - ADULT  Goal: Verbalizes/displays adequate comfort level or baseline comfort level  Description  Interventions:  - Encourage patient to monitor pain and request assistance  - Assess pain using appropriate pain scale  - Administer analgesics based on type and severity of pain and evaluate response  - Implement non-pharmacological measures as appropriate and evaluate response  - Consider cultural and social influences on pain and pain management  - Notify physician/advanced practitioner if interventions unsuccessful or patient reports new pain  Outcome: Progressing     Problem: INFECTION - ADULT  Goal: Absence or prevention of progression during hospitalization  Description  INTERVENTIONS:  - Assess and monitor for signs and symptoms of infection  - Monitor lab/diagnostic results  - Monitor all insertion sites, i e  indwelling lines, tubes, and drains  - Monitor endotracheal (as able) and nasal secretions for changes in amount and color  - [place] appropriate cooling/warming therapies per order  - Administer medications as ordered  - Instruct and encourage patient and family to use good hand hygiene technique  - Identify and instruct in appropriate isolation precautions for identified infection/condition  Outcome: Progressing  Goal: Absence of fever/infection during neutropenic period  Description  INTERVENTIONS:  - Monitor WBC  - Implement neutropenic guidelines  Outcome: Progressing     Problem: SAFETY ADULT  Goal: Patient will remain free of falls  Description  INTERVENTIONS:  - Assess patient frequently for physical needs  -  Identify cognitive and physical deficits and behaviors that affect risk of falls    -  [place] fall precautions as indicated by assessment   - Educate patient/family on patient safety including physical limitations  - Instruct patient to call for assistance with activity based on assessment  - Modify environment to reduce risk of injury  - Consider OT/PT consult to assist with strengthening/mobility  Outcome: Progressing  Goal: Maintain or return to baseline ADL function  Description  INTERVENTIONS:  -  Assess patient's ability to carry out ADLs; assess patient's baseline for ADL function and identify physical deficits which impact ability to perform ADLs (bathing, care of mouth/teeth, toileting, grooming, dressing, etc )  - Assess/evaluate cause of self-care deficits   - Assess range of motion  - Assess patient's mobility; develop plan if impaired  - Assess patient's need for assistive devices and provide as appropriate  - Encourage maximum independence but intervene and supervise when necessary  ¯ Involve family in performance of ADLs  ¯ Assess for home care needs following discharge   ¯ Request OT consult to assist with ADL evaluation and planning for discharge  ¯ Provide patient education as appropriate  Outcome: Progressing  Goal: Maintain or return mobility status to optimal level  Description  INTERVENTIONS:  - Assess patient's baseline mobility status (ambulation, transfers, stairs, etc )    - Identify cognitive and physical deficits and behaviors that affect mobility  - Identify mobility aids required to assist with transfers and/or ambulation (gait belt, sit-to-stand, lift, walker, cane, etc )  - [place] fall precautions as indicated by assessment  - Record patient progress and toleration of activity level on Mobility SBAR; progress patient to next Phase/Stage  - Instruct patient to call for assistance with activity based on assessment  - Request Rehabilitation consult to assist with strengthening/weightbearing, etc   Outcome: Progressing     Problem: DISCHARGE PLANNING  Goal: Discharge to home or other facility with appropriate resources  Description  INTERVENTIONS:  - Identify barriers to discharge w/patient and caregiver  - Arrange for needed discharge resources and transportation as appropriate  - Identify discharge learning needs (meds, wound care, etc )  - Arrange for interpretive services to assist at discharge as needed  - Refer to Case Management Department for coordinating discharge planning if the patient needs post-hospital services based on physician/advanced practitioner order or complex needs related to functional status, cognitive ability, or social support system  Outcome: Progressing     Problem: Knowledge Deficit  Goal: Patient/family/caregiver demonstrates understanding of disease process, treatment plan, medications, and discharge instructions  Description  Complete learning assessment and assess knowledge base    Interventions:  - Provide teaching at level of understanding  - Provide teaching via preferred learning methods  Outcome: Progressing     Problem: MUSCULOSKELETAL - ADULT  Goal: Maintain or return mobility to safest level of function  Description  INTERVENTIONS:  - Assess patient's ability to carry out ADLs; assess patient's baseline for ADL function and identify physical deficits which impact ability to perform ADLs (bathing, care of mouth/teeth, toileting, grooming, dressing, etc )  - Assess/evaluate cause of self-care deficits   - Assess range of motion  - Assess patient's mobility; develop plan if impaired  - Assess patient's need for assistive devices and provide as appropriate  - Encourage maximum independence but intervene and supervise when necessary  - Involve family in performance of ADLs  - Assess for home care needs following discharge   - Request OT consult to assist with ADL evaluation and planning for discharge  - Provide patient education as appropriate  Outcome: Progressing  Goal: Maintain proper alignment of affected body part  Description  INTERVENTIONS:  - Support, maintain and protect limb and body alignment  - Provide pt/fam with appropriate education  Outcome: Progressing

## 2019-02-19 NOTE — PLAN OF CARE
Problem: DISCHARGE PLANNING - CARE MANAGEMENT  Goal: Discharge to post-acute care or home with appropriate resources  Description  INTERVENTIONS:  - Conduct assessment to determine patient/family and health care team treatment goals, and need for post-acute services based on payer coverage, community resources, and patient preferences, and barriers to discharge  - Address psychosocial, clinical, and financial barriers to discharge as identified in assessment in conjunction with the patient/family and health care team  - Arrange appropriate level of post-acute services according to patient's   needs and preference and payer coverage in collaboration with the physician and health care team  - Communicate with and update the patient/family, physician, and health care team regarding progress on the discharge plan  - Arrange appropriate transportation to post-acute venues   2/19/2019 1313 by Tutu Jorgensen RN  Outcome: Progressing  Note:   Notified Edel Clay from [facility] of need of rolling walker and commode

## 2019-02-19 NOTE — PROGRESS NOTES
Orthopedics   Allen Capellan 70 y o  female MRN: [id number]  Unit/Bed#: -01      Subjective:  70 y  o female post operative day 1 left total knee arthroplasty  Pt doing well  Pain controlled  She denies any chest pain, shortness of breath, nausea, vomiting, diarrhea, dizziness, lightheadedness       Labs:  0   Lab Value Date/Time    HCT 29 4 (L) 02/19/2019 0450    HCT 40 9 01/22/2019 1314    HCT 40 3 10/18/2018 0842    HCT 36 2 11/20/2015 0823    HCT 37 4 10/30/2015 0847    HCT 39 8 09/18/2015 0833    HGB 9 7 (L) 02/19/2019 0450    HGB 13 5 01/22/2019 1314    HGB 13 2 10/18/2018 0842    HGB 12 2 11/20/2015 0823    HGB 12 6 10/30/2015 0847    HGB 13 6 09/18/2015 0833    INR 1 03 01/22/2019 1314    INR 1 00 06/24/2015 1133    WBC 4 95 01/22/2019 1314    WBC 4 53 10/18/2018 0842    WBC 5 44 01/08/2018 1104    WBC 3 50 (L) 11/20/2015 0823    WBC 3 62 (L) 10/30/2015 0847    WBC 4 18 (L) 09/18/2015 0833    CRP <3 0 01/22/2019 1314       Meds:    Current Facility-Administered Medications:     acetaminophen (TYLENOL) tablet 650 mg, 650 mg, Oral, Q6H PRN, Lakeisha Loredo MD    aspirin tablet 325 mg, 325 mg, Oral, Q12H, Lakeisha Loredo MD, 325 mg at 02/19/19 0346    bisacodyl (DULCOLAX) rectal suppository 10 mg, 10 mg, Rectal, Daily PRN, Lakeisha Loredo MD    docusate sodium (COLACE) capsule 100 mg, 100 mg, Oral, BID, Lakeisha Loredo MD, 100 mg at 02/18/19 1733    dorzolamide-timolol (COSOPT) 22 3-6 8 MG/ML ophthalmic solution 1 drop, 1 drop, Both Eyes, Q12H [address], Lakeisha Loredo MD, 1 drop at 02/18/19 2130    gabapentin (NEURONTIN) capsule 100 mg, 100 mg, Oral, Q8H [address], Lakeisha Loredo MD, 100 mg at 02/19/19 0500    lactated ringers infusion, 125 mL/hr, Intravenous, Continuous, Lam Wasserman MD, Last Rate: 125 mL/hr at 02/19/19 0006, 125 mL/hr at 02/19/19 0006    latanoprost (XALATAN) 0 005 % ophthalmic solution 1 drop, 1 drop, Both Eyes, HS, Lam Wasserman MD, 1 drop at 02/18/19 2130    meloxicam (MOBIC) tablet 7 5 mg, 7 5 mg, Oral, Daily, Comfort Ruby MD, 7 5 mg at 02/18/19 1548    multivitamin-minerals (CENTRUM) tablet 1 tablet, 1 tablet, Oral, Daily, Comfort Ruby MD, 1 tablet at 02/18/19 1547    oxyCODONE (ROXICODONE) IR tablet 5 mg, 5 mg, Oral, Q4H PRN, Comfort Ruby MD    traMADol (ULTRAM) tablet 50 mg, 50 mg, Oral, Q6H [address], Comfort Ruby MD, 50 mg at 02/19/19 0500    Turmeric CAPS, , Oral, Daily, Comfort Ruby MD    Blood Culture:   No results found for: BLOODCX    Wound Culture:   No results found for: WOUNDCULT    Ins and Outs:  I/O last 24 hours: In: 0598 [I V :1650]  Out: 850 [Urine:800; Blood:50]          Physical:  Vitals:    02/19/19 0700   BP:    Pulse:    Resp:    Temp: 97 8 °F (36 6 °C)   SpO2:      left lower extremity:  · Skin was clean, dry, intact  · Dressing change was performed today  Mepilex was applied  · Toes warm and well perfused  · Patient was neurovascularly intact    _*_*_*_*_*_*_*_*_*_*_*_*_*_*_*_*_*_*_*_*_*_*_*_*_*_*_*_*_*_*_*_*_*_*_*_*_*_*_*_*_*    Assessment: 70 y  o female post operative day 1 left total knee arthroplasty  Doing well    Plan:  · Weight Bearing as tolerated  · Up and out of bed  · DVT prophylaxis - aspirin 325 mg b i d   · Analgesics  · PT/OT  · Will continue to assess for acute blood loss anemia   · Patient states that she is feeling well and would like to go home today if possible  We will have Physical therapy evaluate her and we will round again later this afternoon to see how she did during physical therapy  Her home options would be home with outpatient physical therapy versus home with home health physical therapy  Dano Jackson PA-C

## 2019-02-20 ENCOUNTER — TRANSITIONAL CARE MANAGEMENT (OUTPATIENT)
Dept: INTERNAL MEDICINE CLINIC | Facility: CLINIC | Age: 79
End: 2019-02-20

## 2019-02-20 NOTE — UTILIZATION REVIEW
Notification of Discharge  This is a Notification of Discharge from our facility [address]  Please be advised that this patient has been discharge from our facility  Below you will find the admission and discharge date and time including the patients disposition  PRESENTATION DATE: 2/18/2019  7:43 AM  IP ADMISSION DATE: 2/18/19 1521  DISCHARGE DATE: 2/19/2019  4:07 PM  DISPOSITION: [id number]  Utilization Review Department  Phone: [phone]; Fax [phone]  [email or web address]  ATTENTION: Please call with any questions or concerns to [phone]  and carefully listen to the prompts so that you are directed to the right person  Send all requests for admission clinical reviews, approved or denied determinations and any other requests to fax [phone]   All voicemails are confidential

## 2019-02-21 ENCOUNTER — TELEPHONE (OUTPATIENT)
Dept: OBGYN CLINIC | Facility: HOSPITAL | Age: 79
End: 2019-02-21

## 2019-02-21 ENCOUNTER — OFFICE VISIT (OUTPATIENT)
Dept: PHYSICAL THERAPY | Facility: CLINIC | Age: 79
End: 2019-02-21
Payer: COMMERCIAL

## 2019-02-21 DIAGNOSIS — Z96.652 TOTAL KNEE REPLACEMENT STATUS, LEFT: Primary | ICD-10-CM

## 2019-02-21 PROCEDURE — G8990 OTHER PT/OT CURRENT STATUS: HCPCS | Performed by: PHYSICAL THERAPIST

## 2019-02-21 PROCEDURE — 97140 MANUAL THERAPY 1/> REGIONS: CPT | Performed by: PHYSICAL THERAPIST

## 2019-02-21 PROCEDURE — 97110 THERAPEUTIC EXERCISES: CPT | Performed by: PHYSICAL THERAPIST

## 2019-02-21 PROCEDURE — G8991 OTHER PT/OT GOAL STATUS: HCPCS | Performed by: PHYSICAL THERAPIST

## 2019-02-21 PROCEDURE — 97164 PT RE-EVAL EST PLAN CARE: CPT | Performed by: PHYSICAL THERAPIST

## 2019-02-21 NOTE — UTILIZATION REVIEW
Notification of Discharge  This is a Notification of Discharge from our facility [address]  Please be advised that this patient has been discharge from our facility  Below you will find the admission and discharge date and time including the patients disposition  PRESENTATION DATE: 2/18/2019  7:43 AM  IP ADMISSION DATE: 2/18/19 1521  DISCHARGE DATE: 2/19/2019  4:07 PM  DISPOSITION: [id number] Utilization Review Department  Phone: [phone]; Fax [phone]  [email or web address]  ATTENTION: Please call with any questions or concerns to [phone]  and carefully listen to the prompts so that you are directed to the right person  Send all requests for admission clinical reviews, approved or denied determinations and any other requests to fax [phone]   All voicemails are confidential

## 2019-02-21 NOTE — PROGRESS NOTES
PT Re-Evaluation     Today's date: 2019  Patient name: Danielle Roper  : 1940  MRN: [id number]  Referring provider: Sonia Valiente MD  Dx:   Encounter Diagnosis     ICD-10-CM    1  Total knee replacement status, left Z96 652                   Assessment  Assessment details: Patient was provided a home exercise program and demonstrated an understanding of exercises  Patient was advised to stop performing home exercise program if symptoms increase or new complaints developed  Verbal understanding demonstrated regarding home exercise program instructions  Patient would benefit from skilled physical therapy services for prescribed exercises, manual interventions, neuromuscular re-education, education, and modalities as deemed appropriate to assist patient in achieving their maximum level of function  Patient returns today post op TKA left  She presents in good standing  Her post-op dressing was intact until after she performed heelslides  Dressing came off exposing intact steri-strips, no drainage, mild redness, mild warmth, min-mod edema left LE  She was instructed to keep incision clean/ dry and to leave steri-strips intact until they fall off on their own  MD follow up is 3-1-19  Impairments: abnormal gait, abnormal muscle firing, abnormal or restricted ROM, activity intolerance, impaired physical strength, lacks appropriate home exercise program and pain with function  Understanding of Dx/Px/POC: good  Goals      POST SURGICAL GOALS  STG   1  Patient will demonstrate independence and competence wi-3th HEP 2 -4 weeks  2  Patient will report > 25-50% reduced pain 2-4 weeks  3  Improved knee AROM ext -3, flexion >110   4 weeks      LTG   1  Patient will report improvements with both functional and recreational abilities  4-6 weeks  2  Patient will demonstrate improved motor function  4-6 weeks  3   Full, function left knee AROM  0-120 6-8 weeks  4    Progression to ambulation without assistive device 6 weeks  Plan  Plan details: Patient response to treatment will be monitored each session and progressed accordingly    Thank you for this referral    Patient would benefit from: skilled physical therapy  Planned modality interventions: cryotherapy  Planned therapy interventions: IADL retraining, patient education, postural training, strengthening, stretching, therapeutic exercise, flexibility, home exercise program, manual therapy and gait training  Frequency: 3x week  Duration in weeks: 4  Treatment plan discussed with: patient        Subjective Evaluation    History of Present Illness  Mechanism of injury: Patient had Left TKA 19 at [facility]  She was inpatient overnight  She discharged to home  She was supposed to come to outpt PT yesterday but reports too much pain  She is feeling better now  Patient ambulates into department with standard walker ( adjusted today for appropriate height )  She is WBAT Left  Pain  Current pain ratin  At best pain ratin  At worst pain rating: 10  Quality: sharp  Relieving factors: medications  Aggravating factors: stair climbing, walking and sitting  Progression: worsening    Social Support  Lives in: multiple-level home (bathroom on 2nd floor)  Lives with: spouse, young children and adult children    Hand dominance: right    Treatments  Current treatment: injection treatment and medication  Patient Goals  Patient goals for therapy: decreased pain, increased motion and independence with ADLs/IADLs          Objective     Observations   Left Knee   Positive for edema and effusion  Negative for deformity  Additional Observation Details        Tenderness   Left Knee   Tenderness in the inferior fat pad, lateral joint line, lateral patella, medial joint line, medial patella and superior patella  No tenderness in the pes anserinus       Neurological Testing     Sensation     Knee   Left Knee   Intact: light touch    Right Knee   Intact: light touch     Active Range of Motion   Left Knee   Flexion: 75 degrees with pain  Extension: -6 degrees with pain    Right Knee   Flexion: 141 degrees   Extension: 0 degrees     Passive Range of Motion   Left Knee   Flexion: 96 degrees with pain  Extension: -4 degrees with pain    Mobility   Patellar Mobility:   Left Knee   Hypomobile: left medial, left lateral, left superior and left inferior    Strength/Myotome Testing     Left Knee   Flexion: 4-  Extension: 2+  Quadriceps contraction: poor    Right Knee   Normal strength  Quadriceps contraction: good    General Comments:      Knee Comments  GAIT - guarded, slowed miladys, shortened step length bilaterally    Patient did better with cueing for improved flexion thru swing phase, increased heel- toe progression    TUG  = 29 53 seconds with standard walker    Girth - sup patella L = 46 9  W/bandage on             Inf patella L = 44 5 w/bandage on             Precautions: Left TKA 2-     Daily Treatment Diary     Manual  2/21            PROM Left knee flexion/ ext db                                                   PROM -6-96                Exercise Diary  2/11 2/21           bike             Qs, gs 3s x 20 3s x 20           Heel slides 10s x 10 10s x 10 w/ strap           AP 20x 20x            laq 3s x 20             SLR / SAQ  AA x 20/ AA x 20           hss             Supine abd 20 x                          Gait training  SW - db                        Stand hr, abd, hs curls                                                                                                                         Modalities  2/21            ICE  Left knee 10'

## 2019-02-22 NOTE — TELEPHONE ENCOUNTER
Pt contacted today to conduct his postoperative follow up call assessment  Pt reports "everything is okay" and doing "good, not much of pain " Pt reports 5 out of 10 pain scale at this time, taking oxycodone 5mg when needed but reports "yesterday I didn't take any " Pt states I don't see much" when asked about swelling  Pt continues to elevate, encouraged pt to continue with icing  Pt denies drainage from incision, reports it is Dry and intact  Pt reports ambulating with a walker, "going good, no problem" denies any falls since time of DC  Pt has outpatient PT today at 11:30am      Pt reports taking ASA 325mg 2x daily for post op anticoagulation, denies any issues with that  AVS, AVS med list and F/Us reviewed with pt  Pt reports not yet having a bowel movement, states she is passing gas, taking colace 2x a day, will increase fluids and ambulation as tolerated today, starting prunes also  Pt reports " I am feeling much much better "  Pt denies nausea, vomiting, or abdominal pain  Pt denies chest pain, SOB, dizziness, fever, and/or calf pain  Pt denies any concerns, symptoms or questions at this time  pt encouraged to call me with questions, concerns or issues

## 2019-02-25 ENCOUNTER — OFFICE VISIT (OUTPATIENT)
Dept: PHYSICAL THERAPY | Facility: CLINIC | Age: 79
End: 2019-02-25
Payer: COMMERCIAL

## 2019-02-25 DIAGNOSIS — Z96.652 TOTAL KNEE REPLACEMENT STATUS, LEFT: Primary | ICD-10-CM

## 2019-02-25 PROCEDURE — 97110 THERAPEUTIC EXERCISES: CPT | Performed by: PHYSICAL THERAPIST

## 2019-02-25 PROCEDURE — 97140 MANUAL THERAPY 1/> REGIONS: CPT | Performed by: PHYSICAL THERAPIST

## 2019-02-25 NOTE — PROGRESS NOTES
PT Re-Evaluation     Today's date: 2019  Patient name: Cristal Lowe  : 1940  MRN: [id number]  Referring provider: Grecia Deutsch MD  Dx:   Encounter Diagnosis     ICD-10-CM    1  Total knee replacement status, left Z96 652                   Assessment  Assessment details: Patient was provided a home exercise program and demonstrated an understanding of exercises  Patient was advised to stop performing home exercise program if symptoms increase or new complaints developed  Verbal understanding demonstrated regarding home exercise program instructions  Patient would benefit from skilled physical therapy services for prescribed exercises, manual interventions, neuromuscular re-education, education, and modalities as deemed appropriate to assist patient in achieving their maximum level of function  Patient returns today post op TKA left  She presents in good standing  Her post-op dressing was intact until after she performed heelslides  Dressing came off exposing intact steri-strips, no drainage, mild redness, mild warmth, min-mod edema left LE  She was instructed to keep incision clean/ dry and to leave steri-strips intact until they fall off on their own  MD follow up is 3-1-19  Impairments: abnormal gait, abnormal muscle firing, abnormal or restricted ROM, activity intolerance, impaired physical strength, lacks appropriate home exercise program and pain with function  Understanding of Dx/Px/POC: good  Goals      POST SURGICAL GOALS  STG   1  Patient will demonstrate independence and competence wi-3th HEP 2 -4 weeks  2  Patient will report > 25-50% reduced pain 2-4 weeks  3  Improved knee AROM ext -3, flexion >110   4 weeks      LTG   1  Patient will report improvements with both functional and recreational abilities  4-6 weeks  2  Patient will demonstrate improved motor function  4-6 weeks  3   Full, function left knee AROM  0-120 6-8 weeks  4    Progression to ambulation without assistive device 6 weeks  Plan  Plan details: Patient response to treatment will be monitored each session and progressed accordingly    Thank you for this referral    Patient would benefit from: skilled physical therapy  Planned modality interventions: cryotherapy  Planned therapy interventions: IADL retraining, patient education, postural training, strengthening, stretching, therapeutic exercise, flexibility, home exercise program, manual therapy and gait training  Frequency: 3x week  Duration in weeks: 4  Treatment plan discussed with: patient        Subjective Evaluation    History of Present Illness  Mechanism of injury: Patient had Left TKA 19 at [address] [place] [address]  She was inpatient overnight  She discharged to home  She was supposed to come to outpt PT yesterday but reports too much pain  She is feeling better now  Patient ambulates into department with standard walker ( adjusted today for appropriate height )  She is WBAT Left  Pain  Current pain ratin  At best pain ratin  At worst pain rating: 10  Quality: sharp  Relieving factors: medications  Aggravating factors: stair climbing, walking and sitting  Progression: worsening    Social Support  Lives in: multiple-level home (bathroom on 2nd floor)  Lives with: spouse, young children and adult children    Hand dominance: right    Treatments  Current treatment: injection treatment and medication  Patient Goals  Patient goals for therapy: decreased pain, increased motion and independence with ADLs/IADLs          Objective     Observations   Left Knee   Positive for edema and effusion  Negative for deformity  Additional Observation Details        Tenderness   Left Knee   Tenderness in the inferior fat pad, lateral joint line, lateral patella, medial joint line, medial patella and superior patella  No tenderness in the pes anserinus       Neurological Testing     Sensation     Knee   Left Knee   Intact: light touch    Right Knee   Intact: light touch     Active Range of Motion   Left Knee   Flexion: 75 degrees with pain  Extension: -6 degrees with pain    Right Knee   Flexion: 141 degrees   Extension: 0 degrees     Passive Range of Motion   Left Knee   Flexion: 96 degrees with pain  Extension: -4 degrees with pain    Mobility   Patellar Mobility:   Left Knee   Hypomobile: left medial, left lateral, left superior and left inferior    Strength/Myotome Testing     Left Knee   Flexion: 4-  Extension: 2+  Quadriceps contraction: poor    Right Knee   Normal strength  Quadriceps contraction: good    General Comments:      Knee Comments  GAIT - guarded, slowed miladys, shortened step length bilaterally    Patient did better with cueing for improved flexion thru swing phase, increased heel- toe progression    TUG  = 29 53 seconds with standard walker    Girth - sup patella L = 46 9  W/bandage on             Inf patella L = 44 5 w/bandage on             Precautions: Left TKA 2-     Daily Treatment Diary     Manual  2/21            PROM Left knee flexion/ ext db                                                   PROM -6-96                Exercise Diary  2/11 2/21           bike             Qs, gs 3s x 20 3s x 20           Heel slides 10s x 10 10s x 10 w/ strap           AP 20x 20x            laq 3s x 20             SLR / SAQ  AA x 20/ AA x 20           hss             Supine abd 20 x                          Gait training  SW - db                        Stand hr, abd, hs curls                                                                                                                         Modalities  2/21            ICE  Left knee 10'

## 2019-02-25 NOTE — PROGRESS NOTES
Daily Note     Today's date: 2019  Patient name: Cristal Lowe  : 1940  MRN: [id number]  Referring provider: Grecia Deutsch MD  Dx:   Encounter Diagnosis     ICD-10-CM    1  Total knee replacement status, left Z96 652        Start Time: 1045  Stop Time: 1153  Total time in clinic (min): 68 minutes    Subjective: Patient reports that she is having some soreness in her left knee  She reports that she has started to notice some bruising around her upper left thigh  Objective: See treatment diary below  Precautions: Left TKA 2019      Daily Treatment Diary      Manual                     PROM Left knee flexion/ ext db  TK                                                                                           PROM -6-96  -6-106                         Exercise Diary                   bike      10 min (rocking)                 Qs, gs 3s x 20 3s x 20 3s x 20                 Heel slides 10s x 10 10s x 10 w/ strap  10s x 10 w/ strap                 AP 20x 20x   NP                 laq 3s x 20     NP                 SLR / SAQ   AA x 20/ AA x 20  NP                 hss      20 sec x 5                 Supine abd 20 x                       gastroc stretch with strap     20 sec x 5                 Gait training   SW - db  NP                                         Stand hr, abd, hs curls      NP                                                                                                                                                                                                                       Modalities                     ICE  Left knee 10'  10' TK                                                                          Assessment: Patient was unable to complete full revolution on recumbent bike  She was able to reach 106 degrees of passive left knee flexion, however continues to lack 6 degrees from terminal left knee extension   Patient currently ambulating with decreased left knee flexion during swing phase  She will continue to benefit from PT in order to continue to promote normalized left knee ROM and improve gait  Plan: Continue per plan of care

## 2019-02-27 ENCOUNTER — OFFICE VISIT (OUTPATIENT)
Dept: PHYSICAL THERAPY | Facility: CLINIC | Age: 79
End: 2019-02-27
Payer: COMMERCIAL

## 2019-02-27 DIAGNOSIS — Z96.652 TOTAL KNEE REPLACEMENT STATUS, LEFT: Primary | ICD-10-CM

## 2019-02-27 PROCEDURE — 97110 THERAPEUTIC EXERCISES: CPT | Performed by: PHYSICAL THERAPIST

## 2019-02-27 PROCEDURE — 97140 MANUAL THERAPY 1/> REGIONS: CPT | Performed by: PHYSICAL THERAPIST

## 2019-02-27 PROCEDURE — G8991 OTHER PT/OT GOAL STATUS: HCPCS | Performed by: PHYSICAL THERAPIST

## 2019-02-27 PROCEDURE — G8990 OTHER PT/OT CURRENT STATUS: HCPCS | Performed by: PHYSICAL THERAPIST

## 2019-02-27 NOTE — PROGRESS NOTES
Daily Note     Today's date: 2019  Patient name: Declan Deutsch  : 1940  MRN: [id number]  Referring provider: Abbi Gilliam MD  Dx:   Encounter Diagnosis     ICD-10-CM    1  Total knee replacement status, left Z96 652                   Subjective: Patient notes that she is just a little sore  Denies fevers/ chills any signs of infection at this time  She does report that the "end stitches" sticking out are sensitive and notes that she is ready to see the MD this Friday  Objective: See treatment diary below  Precautions: Left TKA 2019      Daily Treatment Diary      Manual                   PROM Left knee flexion/ ext db  TK  db                                                                                         PROM -6-96  -6-106   -4 -  114                       Exercise Diary                 bike      10 min (rocking)  10' [place]               Qs, gs 3s x 20 3s x 20 3s x 20  3s x 20                Heel slides 10s x 10 10s x 10 w/ strap  10s x 10 w/ strap  10s x 10               AP 20x 20x   NP                 Laq/ hip flexion 3s x 20     NP  20x ea               SLR / SAQ   AA x 20/ AA x 20  NP  20x ea                hss      20 sec x 5                 Supine abd 20 x       20x                 gastroc stretch with strap     20 sec x 5                 Gait training   SW - db  NP                                         Stand hr, abd, hs curls      NP  20x ea                                                                                                                                                                                                                      Modalities                   ICE  Left knee 10'  10' TK                    game ready medium compression     10'                                                Assessment: Patient did get full revolution of bike today    Extension AA movements are most painful today- Nice improvements with AA flexion achieved  Plan: Continue per plan of care  Progress upright/ dynamic pres as tolerated

## 2019-02-28 ENCOUNTER — OFFICE VISIT (OUTPATIENT)
Dept: INTERNAL MEDICINE CLINIC | Facility: CLINIC | Age: 79
End: 2019-02-28
Payer: COMMERCIAL

## 2019-02-28 ENCOUNTER — OFFICE VISIT (OUTPATIENT)
Dept: PHYSICAL THERAPY | Facility: CLINIC | Age: 79
End: 2019-02-28
Payer: COMMERCIAL

## 2019-02-28 VITALS
HEART RATE: 103 BPM | BODY MASS INDEX: 35.3 KG/M2 | DIASTOLIC BLOOD PRESSURE: 78 MMHG | TEMPERATURE: 98.3 F | WEIGHT: 193 LBS | OXYGEN SATURATION: 98 % | SYSTOLIC BLOOD PRESSURE: 124 MMHG

## 2019-02-28 DIAGNOSIS — M17.12 PRIMARY OSTEOARTHRITIS OF LEFT KNEE: ICD-10-CM

## 2019-02-28 DIAGNOSIS — Z96.652 STATUS POST TOTAL LEFT KNEE REPLACEMENT: Primary | ICD-10-CM

## 2019-02-28 DIAGNOSIS — Z96.652 TOTAL KNEE REPLACEMENT STATUS, LEFT: Primary | ICD-10-CM

## 2019-02-28 PROCEDURE — 1111F DSCHRG MED/CURRENT MED MERGE: CPT | Performed by: PHYSICIAN ASSISTANT

## 2019-02-28 PROCEDURE — 97530 THERAPEUTIC ACTIVITIES: CPT

## 2019-02-28 PROCEDURE — 97110 THERAPEUTIC EXERCISES: CPT

## 2019-02-28 PROCEDURE — 97140 MANUAL THERAPY 1/> REGIONS: CPT

## 2019-02-28 PROCEDURE — 99214 OFFICE O/P EST MOD 30 MIN: CPT | Performed by: PHYSICIAN ASSISTANT

## 2019-02-28 NOTE — PROGRESS NOTES
Assessment/Plan:     Hospital follow-up status post left total knee replacement  Patient is experiencing more pain than she expected  She will call the orthopedist to find out about the oxycodone  She will also ask if it is okay to take the meloxicam since it seems to be helping  She will follow up with them tomorrow  No problem-specific Assessment & Plan notes found for this encounter  There are no diagnoses linked to this encounter  Subjective:     Patient ID: Manuel Ahn is a 70 y o  female  Patient comes in for hospital follow-up  She was admitted on February 18th and discharged on February 19th from [facility] [place] after having a left total knee replacement by Dr Sis Fabian  The patient says she did not expect to have so much pain in her knee after the surgery  She is wearing an Ace bandage sleeve over her knee  She was prescribed oxycodone 5 mg every 4 hours for the pain but she says she does not really think it works very well  Prior to the surgery she had been taking meloxicam 15 mg daily and she says she took 1 of these last night and she was able to sleep which has been difficult for her since the surgery, due to the pain  Patient denies any swelling of the legs, no chest pain or palpitations  She has a little bit of swelling of the joint, mild albeit, likely as a result of the surgery  She does have quite a bit of bruising of her upper left thigh  She is on aspirin to 325 mg twice a day for prophylaxis  She has an appointment tomorrow to see the orthopedist      Review of Systems   Constitutional: Negative for chills, fatigue and fever  Respiratory: Negative for cough, shortness of breath and wheezing  Cardiovascular: Negative for chest pain and palpitations  Gastrointestinal: Negative for abdominal pain  Musculoskeletal: Positive for arthralgias and joint swelling           Objective:     Physical Exam   Constitutional: She appears well-developed and well-nourished  HENT:   Head: Normocephalic and atraumatic  Cardiovascular: Normal rate, regular rhythm and normal heart sounds  Pulmonary/Chest: Effort normal and breath sounds normal  No respiratory distress  Abdominal: Soft  Bowel sounds are normal  There is no tenderness  Musculoskeletal:        Left knee: She exhibits decreased range of motion, swelling and ecchymosis  Legs:  Bruising is in the area indicated in purple, incision is the line indicated in red, incision appears to be healing well, Steri-Strips are in place, no drainage or oozing, slight warmth to the joint, slight swelling to the joint  Vitals:    02/28/19 1250   BP: 124/78   BP Location: Left arm   Patient Position: Sitting   Pulse: 103   Temp: 98 3 °F (36 8 °C)   SpO2: 98%   Weight: 87 5 kg (193 lb)       Transitional Care Management Review:  Brandyn Elizabeth is a 70 y o  female here for TCM follow up       During the TCM phone call patient stated:    TCM Call (since 1/28/2019)     Date and time call was made  2/20/2019 10:39 AM    Patient was hospitialized at  [facility]    Date of Admission  02/18/19    Date of discharge  02/19/19    Diagnosis  left total knee arthroplasty    Disposition  Home      TCM Call (since 1/28/2019)     Post hospital issues  None    Patients specialists  Other (comment)    Other specialists names  Laura Hercules MD    Other specialists contcat #  [phone]    Did you obtain your prescribed medications  -- oxyCODONE    Are you recieving any outpatient services  Yes    What type of services  Physical Therapy          Kristie Monroy PA-C

## 2019-02-28 NOTE — PROGRESS NOTES
Daily Note     Today's date: 2019  Patient name: Talya Ferreira  : 1940  MRN: [id number]  Referring provider: Tori Tripp MD  Dx:   Encounter Diagnosis     ICD-10-CM    1  Total knee replacement status, left Z96 652    2  Primary osteoarthritis of left knee M17 12                   Subjective:  Upon presentation patient reported difficulty sleeping last evening and discomfort this morning  Patient also noted increased ecchymotic areas near incision/surgery site        Objective: See treatment diary below     Manual                 PROM Left knee flexion/ ext db  TK  db LA  PTA                                                                                        PROM -6-96  -6-106   -4 -  114 Ext:  -5*  Flex: 115*                     Exercise Diary               bike      10 min (rocking)  10' [place] 10'             Qs, gs 3s x 20 3s x 20 3s x 20  3s x 20  :03  20x             Heel slides 10s x 10 10s x 10 w/ strap  10s x 10 w/ strap  10s x 10 :10  10x             AP 20x 20x   NP                 Laq/ hip flexion 3s x 20     NP  20x ea 20x each             SLR / SAQ   AA x 20/ AA x 20  NP  20x ea  20x each             hss      20 sec x 5   :20  5x             Supine abd 20 x       20x  20x              gastroc stretch with strap     20 sec x 5                 Gait training   SW - db  NP                                         Stand hr, abd, hs curls      NP  20x ea                                                                                                                                                                                                                      Modalities                 ICE  Left knee 10'  10' TK                    game ready medium compression     10' 10'                                                              Assessment: Tolerated treatment noting decreased pain intensity and improved left knee flexibility post intervention today  Patient demonstrated fatigue post treatment and would benefit from continued PT      Plan: Continue per plan of care  Progress treatment as tolerated

## 2019-03-01 ENCOUNTER — OFFICE VISIT (OUTPATIENT)
Dept: OBGYN CLINIC | Facility: CLINIC | Age: 79
End: 2019-03-01

## 2019-03-01 ENCOUNTER — APPOINTMENT (OUTPATIENT)
Dept: RADIOLOGY | Facility: CLINIC | Age: 79
End: 2019-03-01
Payer: COMMERCIAL

## 2019-03-01 VITALS
DIASTOLIC BLOOD PRESSURE: 77 MMHG | BODY MASS INDEX: 35.51 KG/M2 | SYSTOLIC BLOOD PRESSURE: 116 MMHG | HEIGHT: 62 IN | WEIGHT: 193 LBS | HEART RATE: 90 BPM

## 2019-03-01 DIAGNOSIS — Z96.652 STATUS POST TOTAL LEFT KNEE REPLACEMENT: ICD-10-CM

## 2019-03-01 DIAGNOSIS — Z96.652 STATUS POST TOTAL LEFT KNEE REPLACEMENT: Primary | ICD-10-CM

## 2019-03-01 PROCEDURE — 99024 POSTOP FOLLOW-UP VISIT: CPT | Performed by: ORTHOPAEDIC SURGERY

## 2019-03-01 PROCEDURE — 73562 X-RAY EXAM OF KNEE 3: CPT

## 2019-03-01 NOTE — PROGRESS NOTES
HPI:  Patient is a 70y o  year old female s/p left TKA 2/18/19  She is attending therapy which so far is beneficial  She is using walker to ambulate  She has the normal post operative pain, stiffness  ASA using blood thinner  Oxycodone 5mg for pain  Steri strips over incision  No signs of infection, denies fever,chills         ROS:   General: No fever, no chills, no weight loss, no weight gain  HEENT:  No loss of hearing, no nose bleeds, no sore throat  Eyes:  No eye pain, no red eyes, no visual disturbance  Respiratory:  No cough, no shortness of breath, no wheezing  Cardiovascular:  No chest pain, no palpitations, no edema  GI: No abdominal pain, no nausea, no vomiting  Endocrine: No frequent urination, no excessive thirst  Urinary:  No dysuria, no hematuria, no incontinence  Musculoskeletal: see HPI and PE  Skin:  No rash, no wounds  Neurological:  No dizziness, no headache, no numbness  Psychiatric:  No difficulty concentrating, no depression, no suicide thoughts, no anxiety  Review of all other systems is negative    PMH:  Past Medical History:   Diagnosis Date    Arthritis     Asthma     Benign colon polyp     Effusion of knee     Endometrial cancer (HCC)     last assessed 11/14/17     Esophagitis, reflux     Hematuria     Hemorrhoids, internal     Myalgia     Sebaceous gland disease     Uterine cancer (HCC)        PSH:  Past Surgical History:   Procedure Laterality Date    APPENDECTOMY      HAND FRACTURE REPAIR      Open Treatment of Fracture of one Metacarpal Bone    HYSTERECTOMY      KNEE ARTHROSCOPY      Therapeutic    LYMPH NODE BIOPSY      LYMPHADENECTOMY      staging    LYMPHADENECTOMY      Staging    OOPHORECTOMY      PELVIC LAPAROSCOPY      IL TOTAL KNEE ARTHROPLASTY Left 2/18/2019    Procedure: ARTHROPLASTY KNEE TOTAL;  Surgeon: Leeanna Murray MD;  Location: [facility];  Service: Orthopedics    ROBOTIC ASSISTED HYSTERECTOMY      SALPINGOOPHORECTOMY Bilateral     TUBAL LIGATION      WRIST SURGERY      open treatment of fracture of one metacarpal bone       Medications:  Current Outpatient Medications   Medication Sig Dispense Refill    aspirin 325 mg tablet Take 1 tablet (325 mg total) by mouth every 12 (twelve) hours 30 tablet 0    Cyanocobalamin (VITAMIN B-12) 1000 MCG/15ML LIQD Take 4 tablets by mouth daily      docusate sodium (COLACE) 100 mg capsule Take 1 capsule (100 mg total) by mouth 2 (two) times a day 10 capsule 0    dorzolamide-timolol (COSOPT) 22 3-6 8 MG/ML ophthalmic solution       Ergocalciferol (VITAMIN D2) 2000 units TABS Take 1 tablet by mouth daily      latanoprost (XALATAN) 0 005 % ophthalmic solution Apply 1 drop to eye      Multiple Vitamins-Minerals (MULTI-B-PLUS) TABS Take by mouth      tacrolimus (PROTOPIC) 0 1 % ointment       TURMERIC PO Take 500 Units by mouth daily       No current facility-administered medications for this visit  Allergies:  No Known Allergies    Family History:  Family History   Problem Relation Age of Onset   Horta Cancer Mother         unknown    Cancer Father     Stomach cancer Father        Social History:  Social History     Occupational History    Not on file   Tobacco Use    Smoking status: Never Smoker    Smokeless tobacco: Never Used    Tobacco comment: Denied History of Tobacco use   Substance and Sexual Activity    Alcohol use: No    Drug use: No    Sexual activity: Not on file       Physical Exam:  General :  Alert, cooperative, no distress, appears stated age  Blood pressure 116/77, pulse 90, height 5' 2" (1 575 m), weight 87 5 kg (193 lb)  Head:  Normocephalic, without obvious abnormality, atraumatic   Eyes:  Conjunctiva/corneas clear, EOM's intact,   Ears: Both ears normal appearance, no hearing deficits      Nose: Nares normal, septum midline, no drainage    Neck: Supple,  trachea midline, no adenopathy, no tenderness, no mass   Back:   Symmetric, no curvature, ROM normal, no tenderness Lungs:   Respirations unlabored   Chest Wall:  No tenderness or deformity   Extremities: Extremities normal, atraumatic, no cyanosis or edema      Pulses: 2+ and symmetric   Skin: Skin color, texture, turgor normal, no rashes or lesions      Neurologic: Normal           Left Knee Exam     Tenderness   Left knee tenderness location: post op incisional tenderness  Range of Motion   Extension: 0   Flexion: 100     Tests   Varus: negative Valgus: negative    Other   Erythema: absent  Scars: present (well healed incision)  Sensation: normal  Pulse: present  Swelling: none  Effusion: no effusion present    Comments:  No signs of erythema, ecchymosis, no drainage  Suture tags removed today            Imaging Studies: The following imaging studies were reviewed in office today  My findings are noted  3 views of left knee demonstrate stable total knee prosthesis, no evidence of fracture, loosening  Assessment  Encounter Diagnosis   Name Primary?     Status post total left knee replacement Yes         Plan:    S/p L TKA 2/18/19  Suture tags removed today  She is to continue with walker to ambulate  Continue with physical therapy  See back prior to her trip to [facility] ([facility])    I,:   Jairo Mack am acting as a scribe while in the presence of the attending physician :        I,:   Declan Dennis MD personally performed the services described in this documentation    as scribed in my presence :

## 2019-03-05 ENCOUNTER — OFFICE VISIT (OUTPATIENT)
Dept: PHYSICAL THERAPY | Facility: CLINIC | Age: 79
End: 2019-03-05
Payer: COMMERCIAL

## 2019-03-05 DIAGNOSIS — M17.12 PRIMARY OSTEOARTHRITIS OF LEFT KNEE: ICD-10-CM

## 2019-03-05 DIAGNOSIS — Z96.652 TOTAL KNEE REPLACEMENT STATUS, LEFT: Primary | ICD-10-CM

## 2019-03-05 PROCEDURE — 97110 THERAPEUTIC EXERCISES: CPT

## 2019-03-05 PROCEDURE — 97530 THERAPEUTIC ACTIVITIES: CPT

## 2019-03-05 NOTE — PROGRESS NOTES
Daily Note     Today's date: 3/5/2019  Patient name: Ileana Kaye  : 1940  MRN: [id number]  Referring provider: Sameera Latif MD  Dx:   Encounter Diagnosis     ICD-10-CM    1  Total knee replacement status, left Z96 652    2  Primary osteoarthritis of left knee M17 12                   Subjective: SPR=0/10  Patient entered PT gym with [facility] and reports ambulating indoors without difficulty  SPR=0/10  Objective: See treatment diary below  [facility]  3             PROM Left knee flexion/ ext db  TK  db LA  PTA  LA  PTA                                                                                     PROM -6-96  -6-106   -4 -  114 Ext:  -5*  Flex: 115* Ext:  -4*  Flex:  120*                   Exercise Diary   3           bike      10 min (rocking)  10' [place] 10' 10'           Qs, gs 3s x 20 3s x 20 3s x 20  3s x 20  :03  20x :03  20x each           Heel slides 10s x 10 10s x 10 w/ strap  10s x 10 w/ strap  10s x 10 :10  10x :10  10x           AP 20x 20x   NP                 Laq/ hip flexion 3s x 20     NP  20x ea 20x each 1#  20x each           SLR / SAQ   AA x 20/ AA x 20  NP  20x ea  20x each 1#  20x  each           hss      20 sec x 5   :20  5x :20  3x           Supine abd 20 x       20x  20x 1#  20x            gastroc stretch with strap     20 sec x 5     :20  3x           Gait training   SW - db  NP                                         Stand hr, abd, hs curls      NP  20x ea    1#  20x each  L LE                                                                                                                                                                                                                 Modalities   3             ICE  Left knee 10'  10' TK     10'              game ready medium compression     10' 10'                                                             Assessment: Tolerated treatment well   Patient exhibited good technique with therapeutic exercises and would benefit from continued PT      Plan: Continue per plan of care

## 2019-03-06 ENCOUNTER — OFFICE VISIT (OUTPATIENT)
Dept: PHYSICAL THERAPY | Facility: CLINIC | Age: 79
End: 2019-03-06
Payer: COMMERCIAL

## 2019-03-06 DIAGNOSIS — Z96.652 TOTAL KNEE REPLACEMENT STATUS, LEFT: Primary | ICD-10-CM

## 2019-03-06 DIAGNOSIS — M17.12 PRIMARY OSTEOARTHRITIS OF LEFT KNEE: ICD-10-CM

## 2019-03-06 PROCEDURE — 97110 THERAPEUTIC EXERCISES: CPT

## 2019-03-06 PROCEDURE — 97530 THERAPEUTIC ACTIVITIES: CPT

## 2019-03-06 NOTE — PROGRESS NOTES
Daily Note     Today's date: 3/6/2019  Patient name: Declan Deutsch  : 1940  MRN: [id number]  Referring provider: Abbi Gilliam MD  Dx:   Encounter Diagnosis     ICD-10-CM    1  Total knee replacement status, left Z96 652    2  Primary osteoarthritis of left knee M17 12                   Subjective: "I feel good, looser today, I think because I had therapy yesterday  Jaysoncarmina Ma Jaysoncarmina Ma I walked yesterday around my block, it went well!"      Objective: See treatment diary below  [facility] 2/21  2/25  2/27 2/28 3/5 3           PROM Left knee flexion/ ext db  TK  db LA  PTA  LA  PTA LA  PTA                                                                                   PROM -6-96  -6-106   -4 -  114 Ext:  -5*  Flex: 115* Ext:  -4*  Flex:  120* EXT:  -2*  FLEX:  120*                 Exercise Diary  2/11 2/21  2/25  2/27 2/28 3/5 3         bike      10 min (rocking)  10' [place] 10' 10' L2  10'         Qs, gs 3s x 20 3s x 20 3s x 20  3s x 20  :03  20x :03  20x each :05  20x each         Heel slides 10s x 10 10s x 10 w/ strap  10s x 10 w/ strap  10s x 10 :10  10x :10  10x :10  20x         AP 20x 20x   NP                 Laq/ hip flexion 3s x 20     NP  20x ea 20x each 1#  20x each 1#  20x  each         SLR / SAQ   AA x 20/ AA x 20  NP  20x ea  20x each 1#  20x  each 1#  20x each         hss      20 sec x 5   :20  5x :20  3x :20  3x         Supine abd 20 x       20x  20x 1#  20x S/l  1#  20x          gastroc stretch with strap     20 sec x 5     :20  3x :20  3x         Gait training   SW - db  NP       w/o AD                                 Stand hr, abd, hs curls      NP  20x ea    1#  20x each  L LE 1#L LE  B reps at   20x each         Sit to stand             Hi/lo table 10x2         Side stepping             1# L LE  5 laps @ ll bar                                                                                                                                                               Modalities   3 3/6           ICE  Left knee 10'  10' TK     10' 10'            game ready medium compression     10' 10'                                                                 Assessment: Tolerated treatment well  Patient exhibited good technique with therapeutic exercises  Patient demonstrated good gait pattern without AD on level surface this session       Plan: Continue per plan of care

## 2019-03-07 ENCOUNTER — OFFICE VISIT (OUTPATIENT)
Dept: PHYSICAL THERAPY | Facility: CLINIC | Age: 79
End: 2019-03-07
Payer: COMMERCIAL

## 2019-03-07 DIAGNOSIS — Z96.652 TOTAL KNEE REPLACEMENT STATUS, LEFT: Primary | ICD-10-CM

## 2019-03-07 PROCEDURE — G8991 OTHER PT/OT GOAL STATUS: HCPCS | Performed by: PHYSICAL THERAPIST

## 2019-03-07 PROCEDURE — 97110 THERAPEUTIC EXERCISES: CPT | Performed by: PHYSICAL THERAPIST

## 2019-03-07 PROCEDURE — G8990 OTHER PT/OT CURRENT STATUS: HCPCS | Performed by: PHYSICAL THERAPIST

## 2019-03-07 NOTE — PROGRESS NOTES
PT Re-Evaluation  and PT Discharge    Today's date: 3/7/2019  Patient name: Guillermina Frank  : 1940  MRN: [id number]  Referring provider: Shaylee Clay MD  Dx:   Encounter Diagnosis     ICD-10-CM    1  Total knee replacement status, left Z96 652                   Assessment  Assessment details: Patient presents for reassessment today wishing to stop PT and continue with HEP at this time  She reports that she is feeling good , pleased with her progress at this time  Patient has done very well    She is demonstrating improved ambulation without assistive device  She does lack tke with (+) erp reported during AA movements  She is demonstrating good knee flexion  She continues with mild warmth / mild effusion at this time  Impairments: abnormal or restricted ROM and impaired physical strength  Understanding of Dx/Px/POC: good  Goals  Independent with HEP - MET pre surgical     POST SURGICAL GOALS  STG   1  Patient will demonstrate independence and competence with HEP 2 -4 weeks   MET  2  Patient will report > 25-50% reduced pain 2-4 weeks   MET      LTG   1  Patient will report improvements with both functional and recreational abilities  4-6 weeks    Partially MET  2  Patient will demonstrate improved motor function  4-6 weeks      MET    Plan  Plan details: Please advise as to continuation of services --  Patient is competent with HEP at this time    Thank you for this referral    Patient would benefit from: skilled physical therapy  Planned modality interventions: cryotherapy  Planned therapy interventions: IADL retraining, patient education, postural training, strengthening, stretching, therapeutic exercise, flexibility, home exercise program and manual therapy  Frequency: 2x week  Duration in weeks: 4  Treatment plan discussed with: patient        Subjective Evaluation    History of Present Illness  Mechanism of injury:     Pain  Current pain ratin  At best pain ratin  At worst pain ratin  Quality: dull ache  Relieving factors: medications  Aggravating factors: stair climbing, walking and sitting  Progression: improved    Social Support  Lives in: multiple-level home (bathroom on 2nd floor)  Lives with: spouse, young children and adult children    Hand dominance: right    Treatments  Current treatment: injection treatment and medication  Patient Goals  Patient goals for therapy: decreased pain, increased motion and independence with ADLs/IADLs          Objective     Observations     Right Knee   Positive for effusion  Tenderness   Left Knee   No tenderness in the inferior fat pad, lateral joint line, medial joint line, pes anserinus and superior patella  Neurological Testing     Sensation     Knee   Left Knee   Intact: light touch    Right Knee   Intact: light touch     Active Range of Motion   Left Knee   Flexion: 124 degrees   Extension: -4 degrees with pain    Right Knee   Flexion: 141 degrees   Extension: 0 degrees     Passive Range of Motion   Left Knee   Flexion: 128 degrees with pain  Extension: 0 degrees with pain    Mobility   Patellar Mobility:   Left Knee   Hypomobile: left medial, left lateral, left superior and left inferior    Strength/Myotome Testing     Left Knee   Flexion: 4+  Extension: 4+  Quadriceps contraction: fair    Right Knee   Normal strength  Quadriceps contraction: good    General Comments:      Knee Comments  GAIT - non-antalgic, heel- toe  Patient needs some cueing to avoid the gait pattern that she has had for years prior to TKA  TUG  10,12,10 seconds - NO assistive device             Precautions: Left TKA 2019     Objective: See treatment diary below  [facility] 2/21  2/25  2/27 2/28 3/5 3/6  3/7         PROM Left knee flexion/ ext db  TK  db LA  PTA  LA  PTA LA  PTA  db                                                                                 PROM -6-96  -6-106   -4 -  114 Ext:  -5*  Flex: 115* Ext:  -4*  Flex:  120* EXT:  -2*  FLEX:  120*  -4 AROM  0 AAROM     124/128                Exercise Diary  2/11 2/21 2/25 2/27 2/28 3/5 3/6  3/7       bike      10 min (rocking)  10' [place] 10' 10' L2  10'  10'       Qs, gs 3s x 20 3s x 20 3s x 20  3s x 20  :03  20x :03  20x each :05  20x each  3s x 20       Heel slides 10s x 10 10s x 10 w/ strap  10s x 10 w/ strap  10s x 10 :10  10x :10  10x :10  20x  1 5#  20        AP 20x 20x   NP                 Laq/ hip flexion 3s x 20     NP  20x ea 20x each 1#  20x each 1#  20x  each  1 5# x 20        SLR / SAQ   AA x 20/ AA x 20  NP  20x ea  20x each 1#  20x  each 1#  20x each  1 5# x 20        hss      20 sec x 5   :20  5x :20  3x :20  3x  20s x 3       Supine abd 20 x       20x  20x 1#  20x S/l  1#  20x  1 5# x 20         gastroc stretch with strap     20 sec x 5     :20  3x :20  3x  hep       Gait training   SW - db  NP       W/o AD                                 Stand hr, abd, hs curls      NP  20x ea    1#  20x each  L LE 1#L LE  B reps at   20x each  1 5# x 20 ea       Sit to stand             Hi/lo table 10x2         Side stepping             1# L LE  5 laps @ ll bar  rtb 1 5# x 5        squats @ bar               20x                                                                                                                                    Modalities  2/21 2/25 2/27 2/28 3/5 3/6           ICE  Left knee 10'  10' TK     10' 10'            game ready medium compression     10' 10'

## 2019-03-08 ENCOUNTER — OFFICE VISIT (OUTPATIENT)
Dept: OBGYN CLINIC | Facility: CLINIC | Age: 79
End: 2019-03-08

## 2019-03-08 VITALS
SYSTOLIC BLOOD PRESSURE: 131 MMHG | BODY MASS INDEX: 35.51 KG/M2 | HEART RATE: 98 BPM | HEIGHT: 62 IN | DIASTOLIC BLOOD PRESSURE: 75 MMHG | WEIGHT: 193 LBS

## 2019-03-08 DIAGNOSIS — Z96.652 STATUS POST TOTAL LEFT KNEE REPLACEMENT: Primary | ICD-10-CM

## 2019-03-08 PROCEDURE — 99024 POSTOP FOLLOW-UP VISIT: CPT | Performed by: PHYSICIAN ASSISTANT

## 2019-03-08 RX ORDER — OXYCODONE HYDROCHLORIDE AND ACETAMINOPHEN 5; 325 MG/1; MG/1
1 TABLET ORAL EVERY 8 HOURS PRN
Qty: 25 TABLET | Refills: 0 | Status: SHIPPED | OUTPATIENT
Start: 2019-03-08 | End: 2019-04-19

## 2019-03-08 NOTE — PROGRESS NOTES
CHIEF COMPLAINT:   Chief Complaint   Patient presents with    Left Knee - Post-op         PROCEDURE: S/P left PRADEEP 2/18/2019      SUBJECTIVE: Ileana Kaye is a 70 y o  female is here for follow up  Patient was seen last week, no changes since last week  Patient was told to follow up one last time before her trip to [facility]  Denies any numbness or tingling lower extremity  Patient has been walking without any assistive devices  She has been in therapy making progress  ROS:   General: no fever, no chills  Respiratory:  No coughing, shortness of breath or wheezing  Cardiovascular:  No chest pain, no palpitations  Neurological:  No headaches, no confusion  Review of all other systems is negative    MEDS:   Current Outpatient Medications   Medication Sig Dispense Refill    aspirin 325 mg tablet Take 1 tablet (325 mg total) by mouth every 12 (twelve) hours 30 tablet 0    Cyanocobalamin (VITAMIN B-12) 1000 MCG/15ML LIQD Take 4 tablets by mouth daily      docusate sodium (COLACE) 100 mg capsule Take 1 capsule (100 mg total) by mouth 2 (two) times a day 10 capsule 0    dorzolamide-timolol (COSOPT) 22 3-6 8 MG/ML ophthalmic solution       Ergocalciferol (VITAMIN D2) 2000 units TABS Take 1 tablet by mouth daily      latanoprost (XALATAN) 0 005 % ophthalmic solution Apply 1 drop to eye      Multiple Vitamins-Minerals (MULTI-B-PLUS) TABS Take by mouth      tacrolimus (PROTOPIC) 0 1 % ointment       TURMERIC PO Take 500 Units by mouth daily      oxyCODONE-acetaminophen (PERCOCET) 5-325 mg per tablet Take 1 tablet by mouth every 8 (eight) hours as needed for moderate painMax Daily Amount: 3 tablets 25 tablet 0     No current facility-administered medications for this visit  ALLERGIES: Patient has no known allergies        Vitals:    03/08/19 1013   BP: 131/75   BP Location: Left arm   Patient Position: Sitting   Cuff Size: Standard   Pulse: 98   Weight: 87 5 kg (193 lb)   Height: 5' 2" (1 575 m) PHYSICAL EXAM:    General Appearance:  Alert, cooperative, no distress, appears stated age   Lungs:   respirations unlabored   Chest Wall:  No tenderness or deformity   Heart:  Normal Heart rate noted   Extremities: Extremities normal, atraumatic, no cyanosis or edema   Pulses: 2+ and symmetric   Neurologic: Normal     ORTHO EXAM:  Examination left knee shows anterior incision well healed with no erythema or signs of infection  Range of motion 0-100 degrees with mild effusion  Knee is stable to varus-valgus stress  Sensation is intact to light touch L1-S1 distributions  ASSESSMENT/PLAN:   S/p 2 weeks john procedure  Melinda was seen today for post-op  Diagnoses and all orders for this visit:    Status post total left knee replacement  -     oxyCODONE-acetaminophen (PERCOCET) 5-325 mg per tablet; Take 1 tablet by mouth every 8 (eight) hours as needed for moderate painMax Daily Amount: 3 tablets        There are no Patient Instructions on file for this visit  DISCUSSION SUMMARY:  Patient is S/P 2 weeks left total knee arthroplasty  Patient will continue with physical therapy up until she leaves for [facility] which is next week  Patient will be back in this country on March 29, 2019  Patient will continue taking aspirin 325 mg twice daily for total of 30 days from date of surgery  Patient asking for refill of the oxycodone  She will be giving a few more and understands she will start using less  When patient completes the aspirin she can start taking her meloxicam again    Will follow up with us in six weeks and have x-rays left knee on arrival

## 2019-03-11 ENCOUNTER — APPOINTMENT (OUTPATIENT)
Dept: PHYSICAL THERAPY | Facility: CLINIC | Age: 79
End: 2019-03-11
Payer: COMMERCIAL

## 2019-03-14 ENCOUNTER — APPOINTMENT (OUTPATIENT)
Dept: PHYSICAL THERAPY | Facility: CLINIC | Age: 79
End: 2019-03-14
Payer: COMMERCIAL

## 2019-03-19 ENCOUNTER — APPOINTMENT (OUTPATIENT)
Dept: PHYSICAL THERAPY | Facility: CLINIC | Age: 79
End: 2019-03-19
Payer: COMMERCIAL

## 2019-03-21 ENCOUNTER — APPOINTMENT (OUTPATIENT)
Dept: PHYSICAL THERAPY | Facility: CLINIC | Age: 79
End: 2019-03-21
Payer: COMMERCIAL

## 2019-03-25 ENCOUNTER — APPOINTMENT (OUTPATIENT)
Dept: PHYSICAL THERAPY | Facility: CLINIC | Age: 79
End: 2019-03-25
Payer: COMMERCIAL

## 2019-03-28 ENCOUNTER — APPOINTMENT (OUTPATIENT)
Dept: PHYSICAL THERAPY | Facility: CLINIC | Age: 79
End: 2019-03-28
Payer: COMMERCIAL

## 2019-04-01 ENCOUNTER — OFFICE VISIT (OUTPATIENT)
Dept: INTERNAL MEDICINE CLINIC | Facility: CLINIC | Age: 79
End: 2019-04-01
Payer: COMMERCIAL

## 2019-04-01 VITALS
DIASTOLIC BLOOD PRESSURE: 78 MMHG | HEART RATE: 86 BPM | SYSTOLIC BLOOD PRESSURE: 118 MMHG | BODY MASS INDEX: 35.15 KG/M2 | HEIGHT: 62 IN | OXYGEN SATURATION: 97 % | TEMPERATURE: 98.4 F | WEIGHT: 191 LBS

## 2019-04-01 DIAGNOSIS — J06.9 VIRAL UPPER RESPIRATORY TRACT INFECTION: Primary | ICD-10-CM

## 2019-04-01 DIAGNOSIS — R09.82 POST-NASAL DRIP: ICD-10-CM

## 2019-04-01 PROCEDURE — 1160F RVW MEDS BY RX/DR IN RCRD: CPT | Performed by: PHYSICIAN ASSISTANT

## 2019-04-01 PROCEDURE — 1036F TOBACCO NON-USER: CPT | Performed by: PHYSICIAN ASSISTANT

## 2019-04-01 PROCEDURE — 99214 OFFICE O/P EST MOD 30 MIN: CPT | Performed by: PHYSICIAN ASSISTANT

## 2019-04-01 RX ORDER — FLUTICASONE PROPIONATE 50 MCG
2 SPRAY, SUSPENSION (ML) NASAL DAILY
Qty: 16 G | Refills: 3 | Status: SHIPPED | OUTPATIENT
Start: 2019-04-01 | End: 2019-08-08

## 2019-04-19 ENCOUNTER — OFFICE VISIT (OUTPATIENT)
Dept: OBGYN CLINIC | Facility: CLINIC | Age: 79
End: 2019-04-19

## 2019-04-19 ENCOUNTER — APPOINTMENT (OUTPATIENT)
Dept: RADIOLOGY | Facility: CLINIC | Age: 79
End: 2019-04-19
Payer: COMMERCIAL

## 2019-04-19 VITALS
SYSTOLIC BLOOD PRESSURE: 110 MMHG | HEIGHT: 62 IN | BODY MASS INDEX: 35.15 KG/M2 | DIASTOLIC BLOOD PRESSURE: 76 MMHG | HEART RATE: 78 BPM | WEIGHT: 191 LBS

## 2019-04-19 DIAGNOSIS — Z96.652 STATUS POST TOTAL LEFT KNEE REPLACEMENT: ICD-10-CM

## 2019-04-19 DIAGNOSIS — Z96.652 STATUS POST TOTAL LEFT KNEE REPLACEMENT: Primary | ICD-10-CM

## 2019-04-19 PROCEDURE — 73562 X-RAY EXAM OF KNEE 3: CPT

## 2019-04-19 PROCEDURE — 99024 POSTOP FOLLOW-UP VISIT: CPT | Performed by: PHYSICIAN ASSISTANT

## 2019-04-19 RX ORDER — MELOXICAM 7.5 MG/1
7.5 TABLET ORAL DAILY
Qty: 30 TABLET | Refills: 2 | Status: SHIPPED | OUTPATIENT
Start: 2019-04-19 | End: 2019-08-20

## 2019-05-20 ENCOUNTER — OFFICE VISIT (OUTPATIENT)
Dept: GYNECOLOGIC ONCOLOGY | Facility: CLINIC | Age: 79
End: 2019-05-20
Payer: COMMERCIAL

## 2019-05-20 VITALS
HEART RATE: 74 BPM | HEIGHT: 62 IN | WEIGHT: 191.6 LBS | DIASTOLIC BLOOD PRESSURE: 70 MMHG | BODY MASS INDEX: 35.26 KG/M2 | SYSTOLIC BLOOD PRESSURE: 134 MMHG

## 2019-05-20 DIAGNOSIS — Z85.42 HISTORY OF ENDOMETRIAL CANCER: ICD-10-CM

## 2019-05-20 DIAGNOSIS — Z85.42 ENCOUNTER FOR FOLLOW-UP SURVEILLANCE OF ENDOMETRIAL CANCER: Primary | ICD-10-CM

## 2019-05-20 DIAGNOSIS — Z08 ENCOUNTER FOR FOLLOW-UP SURVEILLANCE OF ENDOMETRIAL CANCER: Primary | ICD-10-CM

## 2019-05-20 DIAGNOSIS — Z12.31 ENCOUNTER FOR SCREENING MAMMOGRAM FOR BREAST CANCER: ICD-10-CM

## 2019-05-20 PROCEDURE — 99212 OFFICE O/P EST SF 10 MIN: CPT | Performed by: PHYSICIAN ASSISTANT

## 2019-06-06 ENCOUNTER — HOSPITAL ENCOUNTER (OUTPATIENT)
Dept: MAMMOGRAPHY | Facility: CLINIC | Age: 79
Discharge: HOME | End: 2019-06-06
Payer: COMMERCIAL

## 2019-06-06 VITALS — HEIGHT: 62 IN | BODY MASS INDEX: 35.15 KG/M2 | WEIGHT: 191 LBS

## 2019-06-06 DIAGNOSIS — Z12.31 ENCOUNTER FOR SCREENING MAMMOGRAM FOR BREAST CANCER: ICD-10-CM

## 2019-06-06 PROCEDURE — 77063 BREAST TOMOSYNTHESIS BI: CPT

## 2019-06-06 PROCEDURE — 77067 SCR MAMMO BI INCL CAD: CPT

## 2019-07-24 ENCOUNTER — APPOINTMENT (OUTPATIENT)
Dept: LAB | Facility: CLINIC | Age: 79
End: 2019-07-24
Payer: COMMERCIAL

## 2019-07-24 ENCOUNTER — OFFICE VISIT (OUTPATIENT)
Dept: INTERNAL MEDICINE CLINIC | Facility: CLINIC | Age: 79
End: 2019-07-24
Payer: COMMERCIAL

## 2019-07-24 VITALS
DIASTOLIC BLOOD PRESSURE: 72 MMHG | WEIGHT: 191.6 LBS | OXYGEN SATURATION: 95 % | HEIGHT: 62 IN | HEART RATE: 75 BPM | SYSTOLIC BLOOD PRESSURE: 120 MMHG | BODY MASS INDEX: 35.26 KG/M2

## 2019-07-24 DIAGNOSIS — Z85.42 HISTORY OF ENDOMETRIAL CANCER: ICD-10-CM

## 2019-07-24 DIAGNOSIS — J45.20 MILD INTERMITTENT ASTHMA WITHOUT COMPLICATION: ICD-10-CM

## 2019-07-24 DIAGNOSIS — M81.0 AGE-RELATED OSTEOPOROSIS WITHOUT CURRENT PATHOLOGICAL FRACTURE: ICD-10-CM

## 2019-07-24 DIAGNOSIS — M19.90 ARTHRITIS: ICD-10-CM

## 2019-07-24 DIAGNOSIS — J45.20 MILD INTERMITTENT ASTHMA WITHOUT COMPLICATION: Primary | ICD-10-CM

## 2019-07-24 PROBLEM — M17.12 PRIMARY OSTEOARTHRITIS OF LEFT KNEE: Status: RESOLVED | Noted: 2019-01-16 | Resolved: 2019-07-24

## 2019-07-24 LAB
ANION GAP SERPL CALCULATED.3IONS-SCNC: 5 MMOL/L (ref 4–13)
BACTERIA UR QL AUTO: ABNORMAL /HPF
BASOPHILS # BLD AUTO: 0.04 THOUSANDS/ΜL (ref 0–0.1)
BASOPHILS NFR BLD AUTO: 1 % (ref 0–1)
BILIRUB UR QL STRIP: NEGATIVE
BUN SERPL-MCNC: 12 MG/DL (ref 5–25)
CALCIUM SERPL-MCNC: 9.1 MG/DL (ref 8.3–10.1)
CHLORIDE SERPL-SCNC: 110 MMOL/L (ref 100–108)
CLARITY UR: ABNORMAL
CO2 SERPL-SCNC: 27 MMOL/L (ref 21–32)
COLOR UR: YELLOW
CREAT SERPL-MCNC: 0.57 MG/DL (ref 0.6–1.3)
EOSINOPHIL # BLD AUTO: 0.11 THOUSAND/ΜL (ref 0–0.61)
EOSINOPHIL NFR BLD AUTO: 3 % (ref 0–6)
ERYTHROCYTE [DISTWIDTH] IN BLOOD BY AUTOMATED COUNT: 13.8 % (ref 11.6–15.1)
GFR SERPL CREATININE-BSD FRML MDRD: 90 ML/MIN/1.73SQ M
GLUCOSE P FAST SERPL-MCNC: 98 MG/DL (ref 65–99)
GLUCOSE UR STRIP-MCNC: NEGATIVE MG/DL
HCT VFR BLD AUTO: 41.3 % (ref 34.8–46.1)
HGB BLD-MCNC: 13.5 G/DL (ref 11.5–15.4)
HGB UR QL STRIP.AUTO: NEGATIVE
HYALINE CASTS #/AREA URNS LPF: ABNORMAL /LPF
IMM GRANULOCYTES # BLD AUTO: 0.02 THOUSAND/UL (ref 0–0.2)
IMM GRANULOCYTES NFR BLD AUTO: 1 % (ref 0–2)
KETONES UR STRIP-MCNC: NEGATIVE MG/DL
LEUKOCYTE ESTERASE UR QL STRIP: ABNORMAL
LYMPHOCYTES # BLD AUTO: 1.23 THOUSANDS/ΜL (ref 0.6–4.47)
LYMPHOCYTES NFR BLD AUTO: 32 % (ref 14–44)
MCH RBC QN AUTO: 30.2 PG (ref 26.8–34.3)
MCHC RBC AUTO-ENTMCNC: 32.7 G/DL (ref 31.4–37.4)
MCV RBC AUTO: 92 FL (ref 82–98)
MONOCYTES # BLD AUTO: 0.28 THOUSAND/ΜL (ref 0.17–1.22)
MONOCYTES NFR BLD AUTO: 7 % (ref 4–12)
NEUTROPHILS # BLD AUTO: 2.21 THOUSANDS/ΜL (ref 1.85–7.62)
NEUTS SEG NFR BLD AUTO: 56 % (ref 43–75)
NITRITE UR QL STRIP: NEGATIVE
NON-SQ EPI CELLS URNS QL MICRO: ABNORMAL /HPF
NRBC BLD AUTO-RTO: 0 /100 WBCS
PH UR STRIP.AUTO: 5.5 [PH]
PLATELET # BLD AUTO: 219 THOUSANDS/UL (ref 149–390)
PMV BLD AUTO: 10.7 FL (ref 8.9–12.7)
POTASSIUM SERPL-SCNC: 4.3 MMOL/L (ref 3.5–5.3)
PROT UR STRIP-MCNC: NEGATIVE MG/DL
RBC # BLD AUTO: 4.47 MILLION/UL (ref 3.81–5.12)
RBC #/AREA URNS AUTO: ABNORMAL /HPF
SODIUM SERPL-SCNC: 142 MMOL/L (ref 136–145)
SP GR UR STRIP.AUTO: 1.02 (ref 1–1.03)
T4 FREE SERPL-MCNC: 0.95 NG/DL (ref 0.76–1.46)
TSH SERPL DL<=0.05 MIU/L-ACNC: 4.26 UIU/ML (ref 0.36–3.74)
UROBILINOGEN UR QL STRIP.AUTO: 0.2 E.U./DL
WBC # BLD AUTO: 3.89 THOUSAND/UL (ref 4.31–10.16)
WBC #/AREA URNS AUTO: ABNORMAL /HPF

## 2019-07-24 PROCEDURE — 85025 COMPLETE CBC W/AUTO DIFF WBC: CPT

## 2019-07-24 PROCEDURE — 36415 COLL VENOUS BLD VENIPUNCTURE: CPT

## 2019-07-24 PROCEDURE — 84443 ASSAY THYROID STIM HORMONE: CPT

## 2019-07-24 PROCEDURE — 84439 ASSAY OF FREE THYROXINE: CPT

## 2019-07-24 PROCEDURE — 81001 URINALYSIS AUTO W/SCOPE: CPT | Performed by: INTERNAL MEDICINE

## 2019-07-24 PROCEDURE — 80048 BASIC METABOLIC PNL TOTAL CA: CPT

## 2019-07-24 PROCEDURE — 82306 VITAMIN D 25 HYDROXY: CPT

## 2019-07-24 PROCEDURE — 99214 OFFICE O/P EST MOD 30 MIN: CPT | Performed by: INTERNAL MEDICINE

## 2019-07-24 NOTE — PROGRESS NOTES
Assessment/Plan:       Diagnoses and all orders for this visit:    Mild intermittent asthma without complication  -     DXA bone density spine hip and pelvis; Future  -     CBC and differential; Future  -     Basic metabolic panel; Future  -     TSH, 3rd generation with Free T4 reflex; Future  -     Urinalysis with reflex to microscopic  -     Vitamin D 25 hydroxy; Future    Arthritis  -     DXA bone density spine hip and pelvis; Future  -     CBC and differential; Future  -     Basic metabolic panel; Future  -     TSH, 3rd generation with Free T4 reflex; Future  -     Urinalysis with reflex to microscopic  -     Vitamin D 25 hydroxy; Future    History of endometrial cancer    Age-related osteoporosis without current pathological fracture  -     DXA bone density spine hip and pelvis; Future  -     CBC and differential; Future  -     Basic metabolic panel; Future  -     TSH, 3rd generation with Free T4 reflex; Future  -     Urinalysis with reflex to microscopic  -     Vitamin D 25 hydroxy; Future          Patient Instructions    Dermatology in Orthopedics need further assessment but she does see specialty providers for this  Internal medicine issues of mild intermittent asthma and osteoporosis are stable  Joint pain is being treated  Screens are up-to-date  Recheck DEXA vitamin-D basic metabolic panel  Follow-up yearly or as needed     influenza vaccine recommended in the fall        Subjective:      Patient ID: Sahara Millard is a 70 y o  female  A 53-year-old female  Endometrial cancer treated at [facility]  She has been experiencing some gait ataxia and the patient herself thinks it is a result of chemotherapy  Intermittent mild intermittent cough variant asthma  This has actually been quite inactive; no recent symptoms  Associated rhinitis  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 extending from calcaneus to the midfoot  History of benign colon polyp    Colonoscopy done earlier in 2018  Mammogram done a  month ago  This was normal  Lichen simplex chronicus of the feet is treated by Dermatology with variable results  age-related osteoporosis of been reported  Taking vitamin-D long-term  This needs a recheck      Left total knee replacement done early 2019  The patient continues to have significant pain in the left knee with visible inflammation and palpable work  On top of that, she has developed severe pain in the right hip worsened with activity and in particular using stairs  She still states she uses a cane for ambulatory support  She developed visual defect of the right eye  She went to an ophthalmologist who is diagnosis is "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 "  Apparently, the suggestion is that this is due to either hyperlipidemia or hypertension  I spoke with the ophthalmologist and explain that she is not hypertensive nor hyperlipidemic  I reviewed the numbers with him  He agreed that there was no indication to treat with antihypertensive or antihyperlipidemic drugs  And he suggested that the only treatment for this situation would be addition of aspirin 81 mg daily which she had already suggested to the patient  At a distance of about 2 years this has now improved back to a baseline state  The following portions of the patient's history were reviewed and updated as appropriate:   She has a past medical history of Arthritis, Asthma, Benign colon polyp, Esophagitis, reflux, Hemorrhoids, internal, and Sebaceous gland disease  ,  does not have any pertinent problems on file  ,   has a past surgical history that includes Appendectomy; Oophorectomy; Lymph node biopsy; Hysterectomy; Laparoscopy; Knee arthroscopy; Robotic assisted hysterectomy; Wrist surgery; Salpingoophorectomy (Bilateral); Lymphadenectomy; Tubal ligation; HAND FRACTURE REPAIR; Lymphadenectomy; and pr total knee arthroplasty (Left, 2/18/2019)  ,  family history includes Breast cancer (age of onset: 28) in her mother; Cancer in her father and mother; Stomach cancer in her father  ,   reports that she has never smoked  She has never used smokeless tobacco  She reports that she does not drink alcohol or use drugs  ,  has No Known Allergies     Current Outpatient Medications   Medication Sig Dispense Refill    Cyanocobalamin (VITAMIN B-12) 1000 MCG/15ML LIQD Take 4 tablets by mouth daily      dorzolamide-timolol (COSOPT) 22 3-6 8 MG/ML ophthalmic solution       Ergocalciferol (VITAMIN D2) 2000 units TABS Take 1 tablet by mouth daily      latanoprost (XALATAN) 0 005 % ophthalmic solution Apply 1 drop to eye      meloxicam (MOBIC) 7 5 mg tablet Take 1 tablet (7 5 mg total) by mouth daily 30 tablet 2    Multiple Vitamins-Minerals (MULTI-B-PLUS) TABS Take by mouth      tacrolimus (PROTOPIC) 0 1 % ointment       TURMERIC PO Take 500 Units by mouth daily      fluticasone (FLONASE) 50 mcg/act nasal spray 2 sprays into each nostril daily (Patient not taking: Reported on 7/24/2019) 16 g 3     No current facility-administered medications for this visit  Review of Systems   Constitutional: Negative for chills and fever  HENT: Negative for sore throat and trouble swallowing  Eyes: Positive for photophobia and visual disturbance  Negative for pain  Respiratory: Negative for cough, shortness of breath and wheezing  Cardiovascular: Negative for chest pain and leg swelling  Gastrointestinal: Negative for abdominal pain, diarrhea, nausea and vomiting  Endocrine: Negative for cold intolerance and heat intolerance  Genitourinary: Negative for dysuria, frequency and pelvic pain  Musculoskeletal: Positive for arthralgias  Negative for joint swelling  Skin: Positive for rash  Negative for wound  Allergic/Immunologic: Negative for immunocompromised state  Neurological: Negative for dizziness, seizures, syncope and headaches     Psychiatric/Behavioral: Negative for dysphoric mood  The patient is not nervous/anxious  Objective:  Vitals:    07/24/19 0850   BP: 120/72   Pulse: 75   SpO2: 95%      Physical Exam   Constitutional: She is oriented to person, place, and time  She appears well-developed and well-nourished  HENT:   Head: Normocephalic and atraumatic  Eyes: Pupils are equal, round, and reactive to light  EOM are normal    Neck: Normal range of motion  Neck supple  No tracheal deviation present  No thyromegaly present  Cardiovascular: Normal rate, regular rhythm and normal heart sounds  Exam reveals no gallop  No murmur heard  Pulmonary/Chest: No respiratory distress  She has no wheezes  She has no rales  Abdominal: Soft  Bowel sounds are normal  There is no tenderness  Musculoskeletal: Normal range of motion  She exhibits no tenderness or deformity  Neurological: She is alert and oriented to person, place, and time  Coordination normal    Skin: Skin is warm  Rash noted  There is erythema  Visible swelling both medial and lateral side of the left knee  That area is warm to touch  The surgical scar itself although visible is well healed  Changes of chronic lichenification noted the bottoms of both feet sick standing both medially and laterally about 3-5 cm above the soles  Psychiatric: She has a normal mood and affect   Judgment normal

## 2019-07-24 NOTE — PATIENT INSTRUCTIONS
Dermatology in Orthopedics need further assessment but she does see specialty providers for this  Internal medicine issues of mild intermittent asthma and osteoporosis are stable  Joint pain is being treated  Screens are up-to-date  Recheck DEXA vitamin-D basic metabolic panel        Follow-up yearly or as needed     influenza vaccine recommended in the fall

## 2019-07-25 ENCOUNTER — TELEPHONE (OUTPATIENT)
Dept: INTERNAL MEDICINE CLINIC | Facility: CLINIC | Age: 79
End: 2019-07-25

## 2019-07-25 DIAGNOSIS — E55.9 VITAMIN D DEFICIENCY: Primary | ICD-10-CM

## 2019-07-25 LAB — 25(OH)D3 SERPL-MCNC: 17 NG/ML (ref 30–100)

## 2019-07-25 RX ORDER — ERGOCALCIFEROL 1.25 MG/1
50000 CAPSULE ORAL WEEKLY
Qty: 12 CAPSULE | Refills: 0 | Status: SHIPPED | OUTPATIENT
Start: 2019-07-25 | End: 2019-10-11 | Stop reason: SDUPTHER

## 2019-07-25 NOTE — TELEPHONE ENCOUNTER
----- Message from Toby Bello MD sent at 7/25/2019  3:00 PM EDT -----  Low vitamin- D    Should take vitamin-D 21036 units weekly x3 months with a vitamin-D recheck after

## 2019-08-08 ENCOUNTER — OFFICE VISIT (OUTPATIENT)
Dept: OBGYN CLINIC | Facility: CLINIC | Age: 79
End: 2019-08-08
Payer: COMMERCIAL

## 2019-08-08 ENCOUNTER — APPOINTMENT (OUTPATIENT)
Dept: RADIOLOGY | Facility: CLINIC | Age: 79
End: 2019-08-08
Payer: COMMERCIAL

## 2019-08-08 VITALS
HEIGHT: 62 IN | SYSTOLIC BLOOD PRESSURE: 120 MMHG | HEART RATE: 77 BPM | BODY MASS INDEX: 35.37 KG/M2 | DIASTOLIC BLOOD PRESSURE: 74 MMHG | WEIGHT: 192.2 LBS

## 2019-08-08 DIAGNOSIS — R29.898 WEAKNESS OF BOTH HIPS: ICD-10-CM

## 2019-08-08 DIAGNOSIS — M76.31 IT BAND SYNDROME, RIGHT: ICD-10-CM

## 2019-08-08 DIAGNOSIS — G57.01 PIRIFORMIS SYNDROME OF RIGHT SIDE: ICD-10-CM

## 2019-08-08 DIAGNOSIS — M25.551 PAIN IN RIGHT HIP: ICD-10-CM

## 2019-08-08 DIAGNOSIS — M70.61 TROCHANTERIC BURSITIS OF RIGHT HIP: Primary | ICD-10-CM

## 2019-08-08 PROCEDURE — 99213 OFFICE O/P EST LOW 20 MIN: CPT | Performed by: FAMILY MEDICINE

## 2019-08-08 PROCEDURE — 20610 DRAIN/INJ JOINT/BURSA W/O US: CPT | Performed by: FAMILY MEDICINE

## 2019-08-08 PROCEDURE — 73502 X-RAY EXAM HIP UNI 2-3 VIEWS: CPT

## 2019-08-08 RX ORDER — LIDOCAINE HYDROCHLORIDE 10 MG/ML
5 INJECTION, SOLUTION INFILTRATION; PERINEURAL
Status: COMPLETED | OUTPATIENT
Start: 2019-08-08 | End: 2019-08-08

## 2019-08-08 RX ORDER — MELOXICAM 15 MG/1
15 TABLET ORAL DAILY
Qty: 30 TABLET | Refills: 1 | Status: SHIPPED | OUTPATIENT
Start: 2019-08-08 | End: 2020-02-13

## 2019-08-08 RX ORDER — TRIAMCINOLONE ACETONIDE 40 MG/ML
40 INJECTION, SUSPENSION INTRA-ARTICULAR; INTRAMUSCULAR
Status: COMPLETED | OUTPATIENT
Start: 2019-08-08 | End: 2019-08-08

## 2019-08-08 RX ORDER — LIDOCAINE HYDROCHLORIDE 10 MG/ML
4 INJECTION, SOLUTION INFILTRATION; PERINEURAL
Status: COMPLETED | OUTPATIENT
Start: 2019-08-08 | End: 2019-08-08

## 2019-08-08 RX ADMIN — LIDOCAINE HYDROCHLORIDE 4 ML: 10 INJECTION, SOLUTION INFILTRATION; PERINEURAL at 13:57

## 2019-08-08 RX ADMIN — LIDOCAINE HYDROCHLORIDE 5 ML: 10 INJECTION, SOLUTION INFILTRATION; PERINEURAL at 13:57

## 2019-08-08 RX ADMIN — TRIAMCINOLONE ACETONIDE 40 MG: 40 INJECTION, SUSPENSION INTRA-ARTICULAR; INTRAMUSCULAR at 13:57

## 2019-08-08 NOTE — PATIENT INSTRUCTIONS
Iliotibial Band Syndrome Exercises   AMBULATORY CARE:   Iliotibial band syndrome (ITBS) , also known as runner's knee, happens when your iliotibial band (ITB) becomes injured and causes pain  The ITB is a long band of tissue  It extends from the outside of your pelvis (hip bone) to the outside of your tibia (shin bone)  It helps keeps the knee in the correct position when you stand or move  ITBS occurs most often in long distance runners and cyclists  What you need to know about ITB exercises:  ITB exercises help strengthen the muscles around your knee and hip  Strong muscles can help reduce pain and decrease your risk of future injury  Contact your healthcare provider if:   · You have sharp pain during exercise or at rest     · You have questions or concerns about stretches or exercises  Decrease pain and swelling:   · Apply ice  on your hip, knee, or thigh for 15 to 20 minutes every hour or as directed  Use an ice pack, or put crushed ice in a plastic bag  Cover it with a towel  Ice helps prevent tissue damage and decreases swelling and pain  · Apply heat  on your hip, knee, or thigh for 20 to 30 minutes before you stretch or exercise  Use a heat pack or wet a washcloth and heat it for 15 seconds in the microwave  Heat helps decrease pain and makes it easier to stretch your muscles  · Massage painful areas as directed  Use a foam roller to gently massage your painful areas  Place the foam roller on a flat surface  Lie on your side with the foam roller against your painful leg  Move your body so that it rolls up and down from your hip to above your knee  Do not lie with it against the outside of your knee cap  Exercise safety:  Do not start an exercise program before you talk to your healthcare provider  You may need to wait until your swelling and pain have gone down before you start to exercise  · Move slowly and smoothly  Avoid fast or jerky motions  This will help prevent another injury  · Breathe normally  Do not hold your breath  It is important to breathe in and out so you do not tense up during exercise  Tension could prevent you from moving your joint in a full range of motion  · Do the exercises and stretches on both legs  Do this so both ITBs remain strong and flexible  · Stop if you feel sharp pain or an increase in pain  Stop the exercise and contact your healthcare provider if you have these symptoms  It is normal to feel some discomfort, such as a dull ache, during exercise  Regular exercise will help decrease your discomfort over time  · Warm up before you stretch and exercise  This will help prevent an injury  Walk or ride a stationary bike for 5 to 10 minutes  How to perform ITB stretches:  Always stretch before and after you do strengthening exercises  Hold each stretch for 30 seconds to 1 minute  Repeat each stretch 2 to 3 times or as directed  · Standing ITB stretch:  Stand with your injured leg behind your other leg  Cross your front leg over your injured leg  Bend sideways toward the hip that is not injured  Stop when you feel a stretch in the hip of your injured leg  Repeat on the other side  · Lying ITB stretch:  Lie on your back  Bend the knee of your injured leg toward your chest  Place your hand on the outside of your thigh  Slowly pull your knee across your body  Stop when you feel a stretch in your hip and outside of your thigh  Repeat on the other side  · Hip stretch:  Lie on the ground  Place both hands on the shin of 1 leg  Pull your knee toward your chest  Repeat on the other side  · Standing quadriceps stretch:  Stand and place one hand against a wall or hold the back of a chair for balance  With your weight on one leg, bend your other leg and grab your ankle  Pull your heel toward your buttocks  · Sitting hamstring stretch:  Sit with both legs straight in front of you   Place your palms on the floor and slide your hands forward until you feel the stretch  If possible, grab your toes  Do not round your back  How to perform ITB strengthening exercises: Your healthcare provider will tell you how often to do the following exercises:  · Standing half squats:  Stand with your feet shoulder-width apart  Lean your back against a wall or hold the back of a chair for balance, if needed  Slowly sit down about 10 inches, as if you are going to sit in a chair  Put most of your weight in your heels  Hold the squat for 5 seconds, then slowly rise to a standing position  Do 3 sets of 10 squats  · Sitting leg lifts:  Sit in a chair with both feet flat on the floor  Slowly straighten and raise one leg  Squeeze your thigh muscles and hold for 5 seconds  Relax and return your foot to the floor  Do 3 sets of 10 lifts on each leg  · Single leg dips:  Stand on your injured leg, between 2 chairs  The backs of the chairs should be toward you  Put 1 hand on each chair  Straighten your leg that is not injured and lift it off the floor  Use the chairs to hold some of your weight  Bend the knee of your injured leg  Slowly lower your body toward the floor a few inches  Your weight should be in your heel  Hold for 5 seconds  Slowly return to a standing position  Do 3 sets of 10 on each leg  · Standing hamstring curls: Face a wall and place both palms flat on the wall  Instead you can hold the back of a chair for balance  With your weight on 1 leg, lift your other foot as close to your buttocks as you can  Hold for 5 seconds and then lower your leg  Do 3 sets of 10 curls on each leg  · Hip adduction:  Lie on your injured side  Cross your top leg over your injured leg  Put your foot on the floor in front of you  Raise your injured leg until it touches the other leg  Slowly lower the leg to the floor  Do 3 sets of 10 on each leg  · Hip abduction:  Lie on your side that is not injured  Straighten your legs  Slowly raise your injured leg as high as you can  Keep your foot pointing straight  Hold for 5 seconds then slowly lower your leg  Do 3 sets of 10 on each leg  Follow up with your healthcare provider as directed:  Write down your questions so you remember to ask them during your visits  © 2017 [employer] Information is for End User's use only and may not be sold, redistributed or otherwise used for commercial purposes  All illustrations and images included in CareNotes® are the copyrighted property of [employer]  or Darin Barraza  The above information is an  only  It is not intended as medical advice for individual conditions or treatments  Talk to your doctor, nurse or pharmacist before following any medical regimen to see if it is safe and effective for you

## 2019-08-08 NOTE — PROGRESS NOTES
Assessment/Plan:  Assessment/Plan   Diagnoses and all orders for this visit:    Trochanteric bursitis of right hip  -     XR hip/pelv 2-3 vws right if performed; Future  -     Large joint arthrocentesis: R greater trochanteric bursa  -     meloxicam (MOBIC) 15 mg tablet; Take 1 tablet (15 mg total) by mouth daily    It band syndrome, right    Piriformis syndrome of right side    Weakness of both hips        53-year-old female with right hip pain many years duration  Discussed with patient physical exam, radiographs, impression and plan  X-rays of the hip are unremarkable for osseous abnormality  Physical noted for significant tenderness upon palpation of the greater trochanter on the right, with mild tenderness upon palpation of the IT band and piriformis  There is no groin pain with FOX and FADDIR maneuvers  She has normal strength and sensation both lower extremities  She does have weakness in hips with abduction  Clinical impression that she is symptomatic from greater trochanter bursitis  I discussed regimen of anti-inflammatory, corticosteroid injection, and home exercise to which she agreed  I administered mixture of 4 cc 1% lidocaine 1 cc Kenalog to the right greater trochanter bursa without complication  She is to take meloxicam 15 mg once daily food consistently 30 days  She declines formal physical therapy so I provided her with printed instructions of home exercises  She will follow up with me in 6 weeks at which point she will be re-evaluated  Subjective:   Patient ID: Korin Awad is a 70 y o  female  Chief Complaint   Patient presents with    Right Hip - Pain       53-year-old female presents for evaluation of right hip pain of many years duration    Pain described as localized to the lateral aspect of the hip, constant, achy and sometimes sharp, radiating distally to the lower leg and foot, associated with cramping and numbness, worse with direct pressure such as when laying on her right side, and improved with removal of pressure  She has been on meloxicam for treatment of osteoarthritis of her knee and this did not help with her pain  Hip Pain   This is a chronic problem  The current episode started more than 1 year ago  The problem occurs constantly  The problem has been gradually worsening  Associated symptoms include arthralgias and numbness  Pertinent negatives include no joint swelling or weakness  The symptoms are aggravated by walking and standing (Direct pressure)  She has tried rest and NSAIDs for the symptoms  The treatment provided no relief  Review of Systems   Musculoskeletal: Positive for arthralgias  Negative for joint swelling  Neurological: Positive for numbness  Negative for weakness  Objective:  Vitals:    08/08/19 1302   BP: 120/74   Pulse: 77   Weight: 87 2 kg (192 lb 3 2 oz)   Height: 5' 2" (1 575 m)     Right Ankle Exam     Muscle Strength   Dorsiflexion:  5/5  Plantar flexion:  5/5      Left Ankle Exam     Muscle Strength   Dorsiflexion:  5/5   Plantar flexion:  5/5       Right Knee Exam     Muscle Strength   The patient has normal right knee strength  Left Knee Exam     Muscle Strength   The patient has normal left knee strength  Right Hip Exam     Tenderness   The patient is experiencing tenderness in the greater trochanter (Piriformis, IT band)  Muscle Strength   Abduction: 4/5   Flexion: 5/5     Tests   FOX: negative    Comments:  Negative FADDIR  Hamstring tightness      Left Hip Exam     Muscle Strength   Abduction: 4/5   Flexion: 5/5     Tests   FOX: negative    Comments:  Negative FADDIR  Hamstring tightness          Strength/Myotome Testing     Left Ankle/Foot   Dorsiflexion: 5  Plantar flexion: 5    Right Ankle/Foot   Dorsiflexion: 5  Plantar flexion: 5      Physical Exam   Constitutional: She is oriented to person, place, and time  She appears well-developed  No distress  HENT:   Head: Normocephalic     Eyes: Conjunctivae are normal    Neck: No tracheal deviation present  Cardiovascular: Normal rate  Pulmonary/Chest: Effort normal  No respiratory distress  Abdominal: She exhibits no distension  Neurological: She is alert and oriented to person, place, and time  Skin: Skin is warm and dry  Psychiatric: She has a normal mood and affect  Her behavior is normal    Nursing note and vitals reviewed  I have personally reviewed pertinent films in PACS and my interpretation is No osseous abnormality of the hip      Large joint arthrocentesis: R greater trochanteric bursa  Date/Time: 8/8/2019 1:57 PM  Consent given by: patient  Site marked: site marked  Timeout: Immediately prior to procedure a time out was called to verify the correct patient, procedure, equipment, support staff and site/side marked as required   Supporting Documentation  Indications: pain   Procedure Details  Location: hip - R greater trochanteric bursa  Preparation: Patient was prepped and draped in the usual sterile fashion  Needle size: 22 G  Ultrasound guidance: no  Approach: lateral  Medications administered: 4 mL lidocaine 1 %; 5 mL lidocaine 1 %; 40 mg triamcinolone acetonide 40 mg/mL    Patient tolerance: patient tolerated the procedure well with no immediate complications  Dressing:  Sterile dressing applied

## 2019-08-20 ENCOUNTER — APPOINTMENT (OUTPATIENT)
Dept: RADIOLOGY | Facility: CLINIC | Age: 79
End: 2019-08-20
Payer: COMMERCIAL

## 2019-08-20 ENCOUNTER — OFFICE VISIT (OUTPATIENT)
Dept: OBGYN CLINIC | Facility: CLINIC | Age: 79
End: 2019-08-20
Payer: COMMERCIAL

## 2019-08-20 VITALS
BODY MASS INDEX: 34.41 KG/M2 | RESPIRATION RATE: 18 BRPM | WEIGHT: 187 LBS | DIASTOLIC BLOOD PRESSURE: 73 MMHG | HEIGHT: 62 IN | SYSTOLIC BLOOD PRESSURE: 107 MMHG | HEART RATE: 63 BPM

## 2019-08-20 DIAGNOSIS — Z96.652 TOTAL KNEE REPLACEMENT STATUS, LEFT: Primary | ICD-10-CM

## 2019-08-20 DIAGNOSIS — Z96.652 TOTAL KNEE REPLACEMENT STATUS, LEFT: ICD-10-CM

## 2019-08-20 PROCEDURE — 73562 X-RAY EXAM OF KNEE 3: CPT

## 2019-08-20 PROCEDURE — 99213 OFFICE O/P EST LOW 20 MIN: CPT | Performed by: PHYSICIAN ASSISTANT

## 2019-08-20 NOTE — PROGRESS NOTES
CHIEF COMPLAINT:   Chief Complaint   Patient presents with    Left Knee - Follow-up, Swelling         PROCEDURE: S/P left TKA 2/18/2019      SUBJECTIVE:  Patient here for follow-up  Left knee has been doing great  Patient pleased with results  Has regained full range of motion  Patient states she has some swelling of the knee  Other than that patient states pain has been well controlled  Denies any numbness tingling lower extremity      ROS:  Review of systems form reviewed August 20, 2019  General: no fever, no chills  Respiratory:  No coughing, shortness of breath or wheezing  Cardiovascular:  No chest pain, no palpitations  Endocrine:  No frequent urination, no increased thirst  Neurological:  No headaches, no confusion  Review of all other systems is negative    MEDS:   Current Outpatient Medications   Medication Sig Dispense Refill    Cyanocobalamin (VITAMIN B-12) 1000 MCG/15ML LIQD Take 4 tablets by mouth daily      dorzolamide-timolol (COSOPT) 22 3-6 8 MG/ML ophthalmic solution       ergocalciferol (VITAMIN D2) 50,000 units Take 1 capsule (50,000 Units total) by mouth once a week 12 capsule 0    latanoprost (XALATAN) 0 005 % ophthalmic solution Apply 1 drop to eye      meloxicam (MOBIC) 15 mg tablet Take 1 tablet (15 mg total) by mouth daily 30 tablet 1    Multiple Vitamins-Minerals (MULTI-B-PLUS) TABS Take by mouth      tacrolimus (PROTOPIC) 0 1 % ointment       TURMERIC PO Take 500 Units by mouth daily       No current facility-administered medications for this visit  ALLERGIES: Patient has no known allergies        Vitals:    08/20/19 0922   BP: 107/73   Pulse: 63   Resp: 18   Weight: 84 8 kg (187 lb)   Height: 5' 2" (1 575 m)       PHYSICAL EXAM:    General Appearance:  Alert, cooperative, no distress, appears stated age   Lungs:   respirations unlabored   Chest Wall:  No tenderness or deformity   Heart:  Normal Heart rate noted   Extremities: Extremities normal, atraumatic, no cyanosis or edema   Pulses: 2+ and symmetric   Neurologic: Normal     ORTHO EXAM:  Left knee exam with well-healed anterior incision  No no erythema, no drainage no signs of infection  Active range of motion 0-120 degrees with no pain and mild effusion  Knee is stable to varus and valgus stress  Sensation is intact to light touch      Imaging  X-rays left knee August 20, 2019 with prosthesis in acceptable position with no evidence of loosening    ASSESSMENT/PLAN:   S/p 6 months above procedure  Melinda was seen today for follow-up and swelling  Diagnoses and all orders for this visit:    Total knee replacement status, left  -     XR knee 3 vw left non injury; Future        There are no Patient Instructions on file for this visit  DISCUSSION SUMMARY:  Patient is S/P 6 months left total knee arthroplasty  Patient will continue with her home exercise program to increase her strength  She may resume her activities no restrictions    Will follow up with us in six months and have an x-ray of left knee upon arrival

## 2019-10-10 DIAGNOSIS — E55.9 VITAMIN D DEFICIENCY: ICD-10-CM

## 2019-10-11 RX ORDER — ERGOCALCIFEROL 1.25 MG/1
CAPSULE ORAL
Qty: 12 CAPSULE | Refills: 0 | Status: SHIPPED | OUTPATIENT
Start: 2019-10-11 | End: 2020-04-09

## 2019-11-06 ENCOUNTER — CLINICAL SUPPORT (OUTPATIENT)
Dept: INTERNAL MEDICINE CLINIC | Facility: CLINIC | Age: 79
End: 2019-11-06
Payer: COMMERCIAL

## 2019-11-06 DIAGNOSIS — Z23 NEED FOR INFLUENZA VACCINATION: Primary | ICD-10-CM

## 2019-11-06 PROCEDURE — G0008 ADMIN INFLUENZA VIRUS VAC: HCPCS

## 2019-11-06 PROCEDURE — 90662 IIV NO PRSV INCREASED AG IM: CPT

## 2019-11-25 ENCOUNTER — OFFICE VISIT (OUTPATIENT)
Dept: GYNECOLOGIC ONCOLOGY | Facility: CLINIC | Age: 79
End: 2019-11-25
Payer: COMMERCIAL

## 2019-11-25 VITALS
HEIGHT: 62 IN | BODY MASS INDEX: 35.46 KG/M2 | SYSTOLIC BLOOD PRESSURE: 130 MMHG | HEART RATE: 67 BPM | WEIGHT: 192.7 LBS | TEMPERATURE: 98 F | DIASTOLIC BLOOD PRESSURE: 80 MMHG

## 2019-11-25 DIAGNOSIS — Z85.42 HISTORY OF ENDOMETRIAL CANCER: ICD-10-CM

## 2019-11-25 DIAGNOSIS — Z85.42 ENCOUNTER FOR FOLLOW-UP SURVEILLANCE OF ENDOMETRIAL CANCER: Primary | ICD-10-CM

## 2019-11-25 DIAGNOSIS — Z08 ENCOUNTER FOR FOLLOW-UP SURVEILLANCE OF ENDOMETRIAL CANCER: Primary | ICD-10-CM

## 2019-11-25 PROCEDURE — 99212 OFFICE O/P EST SF 10 MIN: CPT | Performed by: PHYSICIAN ASSISTANT

## 2019-11-25 RX ORDER — ASPIRIN 81 MG/1
81 TABLET, CHEWABLE ORAL DAILY
COMMUNITY

## 2019-11-25 NOTE — ASSESSMENT & PLAN NOTE
History of stage IB, grade 2 endometrial cancer with positive cytology  She is s/p completion of adjuvant chemotherapy and radiation in November 2015  She is clinically without evidence of disease recurrence  Return to the office in 6 months for continued cancer surveillance

## 2019-11-25 NOTE — PROGRESS NOTES
Assessment/Plan:    Problem List Items Addressed This Visit        Other    History of endometrial cancer     History of stage IB, grade 2 endometrial cancer with positive cytology  She is s/p completion of adjuvant chemotherapy and radiation in November 2015  She is clinically without evidence of disease recurrence  Return to the office in 6 months for continued cancer surveillance  Encounter for follow-up surveillance of endometrial cancer - Primary            CHIEF COMPLAINT:   Endometrial cancer surveillance    Problem:  Cancer Staging  History of endometrial cancer  Staging form: Corpus Uteri - Carcinoma, AJCC 8th Edition  - Clinical: FIGO Stage IB - Signed by Garth Meadows PA-C on 5/14/2018        Previous therapy:     History of endometrial cancer     Initial Diagnosis     Endometrial cancer ([facility] [address] )      7/6/2015 Surgery     Robotic-assisted total laparoscopic hysterectomy, bilateral salpingo-oophorectomy, pelvic and para-aortic lymph node dissections on   A  1 3 out of 1 8 cm depth of invasion, negative nodes, no LVSI, positive peritoneal cytology       - 11/20/2015 Chemotherapy     Paclitaxel 175 mg/m2 and carboplatin AUC 6  She received six cycles  - 11/2015 Radiation     Vaginal brachytherapy           Patient ID: Anil Monroy is a 70 y o  female  who has no new complaints today  No vaginal bleeding, abdominal/pelvic pain  Normal bowel function  She has chronic urinary incontinence  No interval change in medical history since last visit  Quality of life is good  The following portions of the patient's history were reviewed and updated as appropriate: allergies, current medications, past medical history and problem list     Review of Systems   Constitutional: Negative  HENT: Negative  Eyes: Negative  Respiratory: Negative  Cardiovascular: Negative  Gastrointestinal: Negative  Genitourinary: Negative  Musculoskeletal: Negative  Skin: Negative  Neurological: Negative  Psychiatric/Behavioral: Negative  Current Outpatient Medications   Medication Sig Dispense Refill    aspirin 81 mg chewable tablet Chew 81 mg daily      Cyanocobalamin (VITAMIN B-12) 1000 MCG/15ML LIQD Take 4 tablets by mouth daily      dorzolamide-timolol (COSOPT) 22 3-6 8 MG/ML ophthalmic solution       ergocalciferol (VITAMIN D2) 50,000 units TAKE ONE CAPSULE BY MOUTH ONE TIME PER WEEK 12 capsule 0    latanoprost (XALATAN) 0 005 % ophthalmic solution Apply 1 drop to eye      meloxicam (MOBIC) 15 mg tablet Take 1 tablet (15 mg total) by mouth daily 30 tablet 1    Multiple Vitamins-Minerals (MULTI-B-PLUS) TABS Take by mouth      tacrolimus (PROTOPIC) 0 1 % ointment       TURMERIC PO Take 500 Units by mouth daily       No current facility-administered medications for this visit  Objective:    Blood pressure 130/80, pulse 67, temperature 98 °F (36 7 °C), temperature source Oral, height 5' 2" (1 575 m), weight 87 4 kg (192 lb 11 2 oz)  Body mass index is 35 25 kg/m²  Body surface area is 1 88 meters squared  Physical Exam   Constitutional: She is oriented to person, place, and time  She appears well-developed and well-nourished  HENT:   Head: Normocephalic and atraumatic  Neck: Normal range of motion  Neck supple  No thyromegaly present  Pulmonary/Chest: Effort normal    Abdominal: Soft  She exhibits no distension and no mass  There is no rebound  Genitourinary:   Genitourinary Comments: The external female genitalia is normal  The bartholin's, uretheral and skenes glands are normal  The urethral meatus is normal (midline with no lesions)  Anus without fissure or lesion  Speculum exam reveals a grossly normal vagina  No masses, lesions, discharge or bleeding  No significant cystocele or rectocele noted  Bimanual exam notes a surgical absent cervix, uterus and adnexal structures  No masses or fullness  Bladder is without fullness, mass or tenderness  Musculoskeletal: Normal range of motion  She exhibits no edema  Lymphadenopathy:     She has no cervical adenopathy  Neurological: She is alert and oriented to person, place, and time  Skin: Skin is warm and dry  No rash noted  Psychiatric: She has a normal mood and affect  Her behavior is normal    Vitals reviewed

## 2019-12-18 ENCOUNTER — APPOINTMENT (OUTPATIENT)
Dept: RADIOLOGY | Facility: CLINIC | Age: 79
End: 2019-12-18
Payer: COMMERCIAL

## 2019-12-18 ENCOUNTER — OFFICE VISIT (OUTPATIENT)
Dept: INTERNAL MEDICINE CLINIC | Facility: CLINIC | Age: 79
End: 2019-12-18
Payer: COMMERCIAL

## 2019-12-18 VITALS
HEIGHT: 62 IN | DIASTOLIC BLOOD PRESSURE: 72 MMHG | SYSTOLIC BLOOD PRESSURE: 118 MMHG | WEIGHT: 195 LBS | HEART RATE: 76 BPM | OXYGEN SATURATION: 96 % | BODY MASS INDEX: 35.88 KG/M2

## 2019-12-18 DIAGNOSIS — T14.90XA TRAUMA: ICD-10-CM

## 2019-12-18 DIAGNOSIS — T14.90XA TRAUMA: Primary | ICD-10-CM

## 2019-12-18 DIAGNOSIS — R07.81 RIB PAIN ON LEFT SIDE: ICD-10-CM

## 2019-12-18 PROCEDURE — 1036F TOBACCO NON-USER: CPT | Performed by: INTERNAL MEDICINE

## 2019-12-18 PROCEDURE — 99213 OFFICE O/P EST LOW 20 MIN: CPT | Performed by: INTERNAL MEDICINE

## 2019-12-18 PROCEDURE — 1160F RVW MEDS BY RX/DR IN RCRD: CPT | Performed by: INTERNAL MEDICINE

## 2019-12-18 PROCEDURE — 1100F PTFALLS ASSESS-DOCD GE2>/YR: CPT | Performed by: INTERNAL MEDICINE

## 2019-12-18 PROCEDURE — 3288F FALL RISK ASSESSMENT DOCD: CPT | Performed by: INTERNAL MEDICINE

## 2019-12-18 PROCEDURE — 71101 X-RAY EXAM UNILAT RIBS/CHEST: CPT

## 2019-12-18 PROCEDURE — 3725F SCREEN DEPRESSION PERFORMED: CPT | Performed by: INTERNAL MEDICINE

## 2019-12-18 NOTE — PATIENT INSTRUCTIONS
Obtain x-ray of chest and left ribs  This trip and fall incident has probably caused a left rib fracture  We need to rule out pulmonary pathology but that is unlikely

## 2019-12-18 NOTE — PROGRESS NOTES
Assessment/Plan:       Diagnoses and all orders for this visit:    Trauma  -     XR ribs left w pa chest min 3 views; Future    Rib pain on left side                Subjective:      Patient ID: Jeanie Elizondo is a 70 y o  female  A functional 70year-old sustained a trip and fall incident about 2 days ago  She was walking and tripped over an object on the street  She fell on her outstretched right arm but also landed with the left arm tucked under her breast   She felt okay with the exception of some pain of the right distal middle finger but a day later-yesterday-  Noted onset of pain in the left anterolateral lower thoracic border exacerbated by taking a deep breath  No dyspnea no cough and no wheeze  The following portions of the patient's history were reviewed and updated as appropriate:   She has a past medical history of Arthritis, Asthma, Benign colon polyp, Esophagitis, reflux, Hemorrhoids, internal, and Sebaceous gland disease  ,  does not have any pertinent problems on file  ,   has a past surgical history that includes Appendectomy; Oophorectomy; Lymph node biopsy; Hysterectomy; Laparoscopy; Knee arthroscopy; Robotic assisted hysterectomy; Wrist surgery; Salpingoophorectomy (Bilateral); Lymphadenectomy; Tubal ligation; HAND FRACTURE REPAIR; Lymphadenectomy; and pr total knee arthroplasty (Left, 2/18/2019)  ,  family history includes Breast cancer (age of onset: 28) in her mother; Cancer in her father and mother; Stomach cancer in her father  ,   reports that she has never smoked  She has never used smokeless tobacco  She reports that she does not drink alcohol or use drugs  ,  has No Known Allergies     Current Outpatient Medications   Medication Sig Dispense Refill    aspirin 81 mg chewable tablet Chew 81 mg daily      Cyanocobalamin (VITAMIN B-12) 1000 MCG/15ML LIQD Take 4 tablets by mouth daily      dorzolamide-timolol (COSOPT) 22 3-6 8 MG/ML ophthalmic solution       ergocalciferol (VITAMIN D2) 50,000 units TAKE ONE CAPSULE BY MOUTH ONE TIME PER WEEK 12 capsule 0    latanoprost (XALATAN) 0 005 % ophthalmic solution Apply 1 drop to eye      Multiple Vitamins-Minerals (MULTI-B-PLUS) TABS Take by mouth      tacrolimus (PROTOPIC) 0 1 % ointment       TURMERIC PO Take 500 Units by mouth daily      meloxicam (MOBIC) 15 mg tablet Take 1 tablet (15 mg total) by mouth daily (Patient not taking: Reported on 12/18/2019) 30 tablet 1     No current facility-administered medications for this visit  Review of Systems   Respiratory: Negative for cough and shortness of breath  Cardiovascular: Negative for chest pain  Musculoskeletal: Positive for arthralgias  Negative for joint swelling  Pain right anterolateral lower thoracic border as reported in the history   Skin: Negative for wound  Objective:  Vitals:    12/18/19 1057   BP: 118/72   Pulse: 76   SpO2: 96%      Physical Exam   Constitutional:    An obese female patient who does report some pain distress in the left antral lateral lower thoracic border when she takes a deep breath   Pulmonary/Chest: Effort normal and breath sounds normal    Musculoskeletal: She exhibits tenderness  Point tenderness left lower thoracic border anterolateral approximately the left anterior axillary line         Patient Instructions    Obtain x-ray of chest and left ribs  This trip and fall incident has probably caused a left rib fracture  We need to rule out pulmonary pathology but that is unlikely

## 2019-12-26 ENCOUNTER — TELEPHONE (OUTPATIENT)
Dept: INTERNAL MEDICINE CLINIC | Facility: CLINIC | Age: 79
End: 2019-12-26

## 2019-12-26 NOTE — TELEPHONE ENCOUNTER
----- Message from Tamiko Vasquez MD sent at 12/26/2019  9:16 AM EST -----  Please call the patient regarding her  Result  no rib fx

## 2020-02-13 ENCOUNTER — OFFICE VISIT (OUTPATIENT)
Dept: INTERNAL MEDICINE CLINIC | Facility: CLINIC | Age: 80
End: 2020-02-13
Payer: COMMERCIAL

## 2020-02-13 VITALS
RESPIRATION RATE: 14 BRPM | SYSTOLIC BLOOD PRESSURE: 110 MMHG | DIASTOLIC BLOOD PRESSURE: 78 MMHG | HEIGHT: 62 IN | BODY MASS INDEX: 36.07 KG/M2 | HEART RATE: 76 BPM | WEIGHT: 196 LBS

## 2020-02-13 DIAGNOSIS — R29.6 FREQUENT FALLS: ICD-10-CM

## 2020-02-13 DIAGNOSIS — L21.9 SEBORRHEA: Primary | ICD-10-CM

## 2020-02-13 DIAGNOSIS — M79.641 RIGHT HAND PAIN: ICD-10-CM

## 2020-02-13 DIAGNOSIS — R27.0 ATAXIA: ICD-10-CM

## 2020-02-13 DIAGNOSIS — J45.20 MILD INTERMITTENT ASTHMA WITHOUT COMPLICATION: ICD-10-CM

## 2020-02-13 PROCEDURE — 1036F TOBACCO NON-USER: CPT | Performed by: INTERNAL MEDICINE

## 2020-02-13 PROCEDURE — 99215 OFFICE O/P EST HI 40 MIN: CPT | Performed by: INTERNAL MEDICINE

## 2020-02-13 PROCEDURE — 3008F BODY MASS INDEX DOCD: CPT | Performed by: INTERNAL MEDICINE

## 2020-02-13 PROCEDURE — 1160F RVW MEDS BY RX/DR IN RCRD: CPT | Performed by: INTERNAL MEDICINE

## 2020-02-13 PROCEDURE — 4040F PNEUMOC VAC/ADMIN/RCVD: CPT | Performed by: INTERNAL MEDICINE

## 2020-02-13 NOTE — PROGRESS NOTES
BMI Counseling: Body mass index is 35 85 kg/m²  The BMI is above normal  Nutrition recommendations include decreasing portion sizes and moderation in carbohydrate intake  Exercise recommendations include moderate physical activity 150 minutes/week  Falls Plan of Care: referral to physical therapy  Patient assessed for orthostatic hypotension  Medications that increase falls were reviewed  Assessed feet and footwear  Assessed visual acuity

## 2020-02-13 NOTE — PROGRESS NOTES
Assessment/Plan:       Diagnoses and all orders for this visit:    Seborrhea  -     neomycin-polymyxin-hydrocortisone (CORTISPORIN) otic solution; Administer 4 drops to the right ear every 8 (eight) hours    Frequent falls  -     MRI brain wo contrast; Future    Mild intermittent asthma without complication  -     Complete PFT with post bronchodilator; Future  -     XR sinuses routine 3+ views; Future    Ataxia  -     MRI brain wo contrast; Future    Right hand pain  -     XR hand 3+ vw right; Future  -     XR wrist 3+ vw right; Future                Subjective:      Patient ID: Joby Estrada is a 70 y o  female  2 complaints, of uncertain relationship the 1 to the other  paresthesia of the right ear  For about 1 month, the patient reports an odd sensation as if something is walking around inside her right ear  There is no associated ringing, hearing loss, pain, or discharge  The left ear feels fine     the patient reports a chronic cough which she has had for number of years  She reports episode of coughing occurring on a daily basis with no shortness of breath, no hemoptysis, and no wheezing  She has been treated for flares or worsening episodes with inhalers but she does not use any on a chronic basis   The patient has been diagnosed with asthma in the past   However, she has never taken a chronic medication  No smoking history  No occupational exposures  The patient reports frequent falls  She will get off balance and will go down  This happens both home and not at home  When she is out of the house, she uses a cane  No history of  Major stroke  There is an ophthalmic diagnosis with some visual deficits  The patient has been told that this represented an " eye stroke "  She has had a right retinal vein occlusion  Associations of urinary incontinence; however, this appears to date from after a surgery for endometrial carcinoma  She had a USMANSO      a 45 minutes visit    Greater than 50% of the time spent in direct patient contact  Multiple prior records have been reviewed  The following portions of the patient's history were reviewed and updated as appropriate:   She has a past medical history of Arthritis, Asthma, Benign colon polyp, Esophagitis, reflux, Hemorrhoids, internal, and Sebaceous gland disease  ,  does not have any pertinent problems on file  ,   has a past surgical history that includes Appendectomy; Oophorectomy; Lymph node biopsy; Hysterectomy; Laparoscopy; Knee arthroscopy; Robotic assisted hysterectomy; Wrist surgery; Salpingoophorectomy (Bilateral); Lymphadenectomy; Tubal ligation; HAND FRACTURE REPAIR; Lymphadenectomy; and pr total knee arthroplasty (Left, 2/18/2019)  ,  family history includes Breast cancer (age of onset: 28) in her mother; Cancer in her father and mother; Stomach cancer in her father  ,   reports that she has never smoked  She has never used smokeless tobacco  She reports that she drank alcohol  She reports that she does not use drugs  ,  has No Known Allergies     Current Outpatient Medications   Medication Sig Dispense Refill    aspirin 81 mg chewable tablet Chew 81 mg daily      Cyanocobalamin (VITAMIN B-12) 1000 MCG/15ML LIQD Take 4 tablets by mouth daily      dorzolamide-timolol (COSOPT) 22 3-6 8 MG/ML ophthalmic solution       ergocalciferol (VITAMIN D2) 50,000 units TAKE ONE CAPSULE BY MOUTH ONE TIME PER WEEK 12 capsule 0    latanoprost (XALATAN) 0 005 % ophthalmic solution Apply 1 drop to eye      Multiple Vitamins-Minerals (MULTI-B-PLUS) TABS Take by mouth      tacrolimus (PROTOPIC) 0 1 % ointment       TURMERIC PO Take 500 Units by mouth daily      neomycin-polymyxin-hydrocortisone (CORTISPORIN) otic solution Administer 4 drops to the right ear every 8 (eight) hours 20 mL 3     No current facility-administered medications for this visit  Review of Systems   Constitutional: Negative for chills and fever     HENT: Negative for congestion, ear discharge, ear pain, facial swelling, sinus pressure, sinus pain, sore throat, tinnitus and voice change  Formiculation of right ear otherwise no ear symptoms   Eyes: Positive for visual disturbance  Respiratory: Positive for cough  Negative for shortness of breath and wheezing  Cardiovascular: Negative for chest pain and leg swelling  Gastrointestinal: Negative for abdominal pain  Endocrine: Negative for cold intolerance and heat intolerance  Genitourinary: Negative for difficulty urinating  Musculoskeletal: Positive for arthralgias and gait problem  Skin: Negative for rash  Neurological: Negative for dizziness, tremors, syncope, weakness, numbness and headaches  Frequent falls are reported   Hematological: Does not bruise/bleed easily  Psychiatric/Behavioral: Negative for behavioral problems, confusion and decreased concentration  The patient is not nervous/anxious  Objective:  Vitals:    02/13/20 0952   BP: 110/78   Pulse: 76   Resp: 14      Physical Exam   Constitutional: She appears well-developed and well-nourished  Moderately overweight female patient who appears to be the stated age   HENT:   Head: Normocephalic  Right Ear: Hearing, tympanic membrane and external ear normal    Left Ear: Hearing, tympanic membrane, external ear and ear canal normal    Mouth/Throat: Oropharynx is clear and moist  No oropharyngeal exudate  Some mild rubor and  scaling of the right ear canal   Eyes: Pupils are equal, round, and reactive to light  No scleral icterus  Neck: Normal range of motion  Cardiovascular: Normal rate  Pulmonary/Chest: Effort normal and breath sounds normal  She has no wheezes  She has no rales  Abdominal: Soft  Neurological: She is alert  I observed the patient walking  Her gait is a little ataxic; she walks with a bit of a wobble  She swings the right arm but not the left  There is no tremor noted         Skin: Skin is warm and dry  Psychiatric: She has a normal mood and affect  Her behavior is normal  Judgment normal          Patient Instructions   3 seemingly discrete issues:  A sensation of formiculation in the right ear with what appears to be seborrhea  Will prescribe and otic corticosteroid solution for this   An occasional but chronic cough  Recent normal chest x-ray  I would like to see x-ray of paranasal sinuses, and also a pulmonary function test  A report of frequent falls  An ataxic gait  I would like to see an MRI and referral to physical therapy  Also, laboratory testing to make sure there is no vitamin deficiency states  Further recommendations will it will depend upon response to treatment  In the case of the formiculation of the right ear and results of initial studies in the case of the cough and the gait ataxia    I suspect that the cough is really cough variant asthma and good probably and upper with a trial of Singulair but I would like to see the PFTs 1st

## 2020-02-13 NOTE — PATIENT INSTRUCTIONS
3 seemingly discrete issues:  A sensation of formiculation in the right ear with what appears to be seborrhea  Will prescribe and otic corticosteroid solution for this   An occasional but chronic cough  Recent normal chest x-ray  I would like to see x-ray of paranasal sinuses, and also a pulmonary function test  A report of frequent falls  An ataxic gait  I would like to see an MRI and referral to physical therapy  Also, laboratory testing to make sure there is no vitamin deficiency states  Further recommendations will it will depend upon response to treatment  In the case of the formiculation of the right ear and results of initial studies in the case of the cough and the gait ataxia    I suspect that the cough is really cough variant asthma and good probably and upper with a trial of Singulair but I would like to see the PFTs 1st

## 2020-02-18 ENCOUNTER — HOSPITAL ENCOUNTER (OUTPATIENT)
Dept: MRI IMAGING | Facility: CLINIC | Age: 80
Discharge: HOME | End: 2020-02-18
Payer: COMMERCIAL

## 2020-02-18 DIAGNOSIS — R27.0 ATAXIA: ICD-10-CM

## 2020-02-18 DIAGNOSIS — R29.6 FREQUENT FALLS: ICD-10-CM

## 2020-02-18 PROCEDURE — 70551 MRI BRAIN STEM W/O DYE: CPT

## 2020-02-19 ENCOUNTER — APPOINTMENT (OUTPATIENT)
Dept: RADIOLOGY | Facility: CLINIC | Age: 80
End: 2020-02-19
Payer: COMMERCIAL

## 2020-02-19 DIAGNOSIS — J45.20 MILD INTERMITTENT ASTHMA WITHOUT COMPLICATION: ICD-10-CM

## 2020-02-19 DIAGNOSIS — M79.641 RIGHT HAND PAIN: ICD-10-CM

## 2020-02-19 PROCEDURE — 73130 X-RAY EXAM OF HAND: CPT

## 2020-02-19 PROCEDURE — 73110 X-RAY EXAM OF WRIST: CPT

## 2020-02-19 PROCEDURE — 70220 X-RAY EXAM OF SINUSES: CPT

## 2020-02-25 ENCOUNTER — OFFICE VISIT (OUTPATIENT)
Dept: OBGYN CLINIC | Facility: CLINIC | Age: 80
End: 2020-02-25
Payer: COMMERCIAL

## 2020-02-25 ENCOUNTER — APPOINTMENT (OUTPATIENT)
Dept: RADIOLOGY | Facility: CLINIC | Age: 80
End: 2020-02-25
Payer: COMMERCIAL

## 2020-02-25 VITALS
SYSTOLIC BLOOD PRESSURE: 110 MMHG | WEIGHT: 190 LBS | HEIGHT: 62 IN | DIASTOLIC BLOOD PRESSURE: 71 MMHG | HEART RATE: 56 BPM | BODY MASS INDEX: 34.96 KG/M2

## 2020-02-25 DIAGNOSIS — Z96.652 TOTAL KNEE REPLACEMENT STATUS, LEFT: ICD-10-CM

## 2020-02-25 DIAGNOSIS — Z96.652 TOTAL KNEE REPLACEMENT STATUS, LEFT: Primary | ICD-10-CM

## 2020-02-25 PROCEDURE — 1036F TOBACCO NON-USER: CPT | Performed by: ORTHOPAEDIC SURGERY

## 2020-02-25 PROCEDURE — 3008F BODY MASS INDEX DOCD: CPT | Performed by: ORTHOPAEDIC SURGERY

## 2020-02-25 PROCEDURE — 4040F PNEUMOC VAC/ADMIN/RCVD: CPT | Performed by: ORTHOPAEDIC SURGERY

## 2020-02-25 PROCEDURE — 99213 OFFICE O/P EST LOW 20 MIN: CPT | Performed by: ORTHOPAEDIC SURGERY

## 2020-02-25 PROCEDURE — 73562 X-RAY EXAM OF KNEE 3: CPT

## 2020-02-25 PROCEDURE — 1160F RVW MEDS BY RX/DR IN RCRD: CPT | Performed by: ORTHOPAEDIC SURGERY

## 2020-02-25 NOTE — PROGRESS NOTES
CHIEF COMPLAINT:   Chief Complaint   Patient presents with    Left Knee - Follow-up, Swelling         PROCEDURE: S/P left TKA 2/18/2019      SUBJECTIVE:  Patient here for her one year postop visit  Patient has no complaints of pain  She has been doing home exercise program   She has regained her full range of motion  Has intermittent swelling of the knee at times  Still has some faint tingling sensation lateral aspect of the knee    ROS:  Review of systems form February 25, 2020  General: no fever, no chills  Respiratory:  Positive for cough, positive for wheezing  Cardiovascular:  No chest pain, no palpitations  Endocrine:  No frequent urination, no increased thirst  Neurological:  No headaches, no confusion  Review of all other systems is negative    MEDS:   Current Outpatient Medications   Medication Sig Dispense Refill    aspirin 81 mg chewable tablet Chew 81 mg daily      Cyanocobalamin (VITAMIN B-12) 1000 MCG/15ML LIQD Take 4 tablets by mouth daily      dorzolamide-timolol (COSOPT) 22 3-6 8 MG/ML ophthalmic solution       ergocalciferol (VITAMIN D2) 50,000 units TAKE ONE CAPSULE BY MOUTH ONE TIME PER WEEK 12 capsule 0    latanoprost (XALATAN) 0 005 % ophthalmic solution Apply 1 drop to eye      Multiple Vitamins-Minerals (MULTI-B-PLUS) TABS Take by mouth      neomycin-polymyxin-hydrocortisone (CORTISPORIN) otic solution Administer 4 drops to the right ear every 8 (eight) hours 20 mL 3    tacrolimus (PROTOPIC) 0 1 % ointment       TURMERIC PO Take 500 Units by mouth daily       No current facility-administered medications for this visit  ALLERGIES: Patient has no known allergies        Vitals:    02/25/20 0951   BP: 110/71   Pulse: 56   Weight: 86 2 kg (190 lb)   Height: 5' 2" (1 575 m)       PHYSICAL EXAM:    General Appearance:  Alert, cooperative, no distress, appears stated age   Lungs:   respirations unlabored   Chest Wall:  No tenderness or deformity   Heart:  Normal Heart rate noted Extremities: Extremities normal, atraumatic, no cyanosis or edema   Pulses: 2+ and symmetric   Neurologic: Normal     ORTHO EXAM:  Examination left knee shows well-healed anterior incision  No erythema no signs of infection  Active range of motion 0-120 degrees with no effusion  No tenderness with palpation medial lateral aspect the knee  Calf soft and nontender  Sensations intact light touch left lower extremity    Imaging  X-rays left knee February 25, 2020 prosthesis in acceptable position     ASSESSMENT/PLAN:   S/p 1 year above procedure  Melinda was seen today for follow-up and swelling  Diagnoses and all orders for this visit:    Total knee replacement status, left  -     XR knee 3 vw left non injury; Future        There are no Patient Instructions on file for this visit  DISCUSSION SUMMARY:  Patient is S/P 1 year left total knee arthroplasty  Patient doing excellent  Very pleased with results  She will continue with her home exercise program to build up strength  She may resume all activities with no restrictions    She will follow up in one year and have an x-ray of left knee upon arrival        Scribe Attestation    I,:   Pooja Mayo PA-C am acting as a scribe while in the presence of the attending physician :        I,:   Tata Ca MD personally performed the services described in this documentation    as scribed in my presence :

## 2020-03-02 ENCOUNTER — HOSPITAL ENCOUNTER (OUTPATIENT)
Dept: PULMONOLOGY | Facility: HOSPITAL | Age: 80
Discharge: HOME | End: 2020-03-02
Attending: INTERNAL MEDICINE
Payer: COMMERCIAL

## 2020-03-02 DIAGNOSIS — J45.20 MILD INTERMITTENT ASTHMA WITHOUT COMPLICATION: ICD-10-CM

## 2020-03-02 PROCEDURE — 94729 DIFFUSING CAPACITY: CPT

## 2020-03-02 PROCEDURE — 94060 EVALUATION OF WHEEZING: CPT | Performed by: INTERNAL MEDICINE

## 2020-03-02 PROCEDURE — 94729 DIFFUSING CAPACITY: CPT | Performed by: INTERNAL MEDICINE

## 2020-03-02 PROCEDURE — 94727 GAS DIL/WSHOT DETER LNG VOL: CPT

## 2020-03-02 PROCEDURE — 94727 GAS DIL/WSHOT DETER LNG VOL: CPT | Performed by: INTERNAL MEDICINE

## 2020-03-02 PROCEDURE — 94760 N-INVAS EAR/PLS OXIMETRY 1: CPT

## 2020-03-02 PROCEDURE — 94060 EVALUATION OF WHEEZING: CPT

## 2020-03-02 RX ORDER — ALBUTEROL SULFATE 2.5 MG/3ML
2.5 SOLUTION RESPIRATORY (INHALATION) ONCE
Status: COMPLETED | OUTPATIENT
Start: 2020-03-02 | End: 2020-03-02

## 2020-03-02 RX ADMIN — ALBUTEROL SULFATE 2.5 MG: 2.5 SOLUTION RESPIRATORY (INHALATION) at 07:55

## 2020-03-05 ENCOUNTER — OFFICE VISIT (OUTPATIENT)
Dept: INTERNAL MEDICINE CLINIC | Facility: CLINIC | Age: 80
End: 2020-03-05
Payer: COMMERCIAL

## 2020-03-05 VITALS
DIASTOLIC BLOOD PRESSURE: 64 MMHG | WEIGHT: 193.2 LBS | HEIGHT: 62 IN | SYSTOLIC BLOOD PRESSURE: 112 MMHG | BODY MASS INDEX: 35.55 KG/M2 | HEART RATE: 58 BPM | OXYGEN SATURATION: 96 %

## 2020-03-05 DIAGNOSIS — R27.0 ATAXIA: ICD-10-CM

## 2020-03-05 DIAGNOSIS — J45.20 MILD INTERMITTENT ASTHMA WITHOUT COMPLICATION: Primary | ICD-10-CM

## 2020-03-05 PROCEDURE — 3008F BODY MASS INDEX DOCD: CPT | Performed by: INTERNAL MEDICINE

## 2020-03-05 PROCEDURE — 1036F TOBACCO NON-USER: CPT | Performed by: INTERNAL MEDICINE

## 2020-03-05 PROCEDURE — 1160F RVW MEDS BY RX/DR IN RCRD: CPT | Performed by: INTERNAL MEDICINE

## 2020-03-05 PROCEDURE — 4040F PNEUMOC VAC/ADMIN/RCVD: CPT | Performed by: INTERNAL MEDICINE

## 2020-03-05 PROCEDURE — 99213 OFFICE O/P EST LOW 20 MIN: CPT | Performed by: INTERNAL MEDICINE

## 2020-03-05 RX ORDER — MONTELUKAST SODIUM 10 MG/1
10 TABLET ORAL
Qty: 30 TABLET | Refills: 5 | Status: SHIPPED | OUTPATIENT
Start: 2020-03-05 | End: 2020-07-06

## 2020-03-05 NOTE — PATIENT INSTRUCTIONS
A patient with the above symptoms with completely normal examinations  I believe the ataxia is due to a low to grade degree of peripheral neuropathy  I suspect cough variant asthma for the ongoing mild cough  Recommendation is to try using Singulair 10 mg daily  Chest x-ray should be obtained  Follow-up with me in June or July

## 2020-03-05 NOTE — PROGRESS NOTES
Assessment/Plan:       Diagnoses and all orders for this visit:    Mild intermittent asthma without complication  -     montelukast (SINGULAIR) 10 mg tablet; Take 1 tablet (10 mg total) by mouth daily at bedtime  -     XR chest pa & lateral; Future    Ataxia                Subjective:      Patient ID: Acosta Arambula is a 70 y o  female  HPI    The following portions of the patient's history were reviewed and updated as appropriate:   She has a past medical history of Arthritis, Asthma, Benign colon polyp, Esophagitis, reflux, Hemorrhoids, internal, and Sebaceous gland disease  ,  does not have any pertinent problems on file  ,   has a past surgical history that includes Appendectomy; Oophorectomy; Lymph node biopsy; Hysterectomy; Laparoscopy; Knee arthroscopy; Robotic assisted hysterectomy; Wrist surgery; Salpingoophorectomy (Bilateral); Lymphadenectomy; Tubal ligation; HAND FRACTURE REPAIR; Lymphadenectomy; and pr total knee arthroplasty (Left, 2/18/2019)  ,  family history includes Breast cancer (age of onset: 28) in her mother; Cancer in her father and mother; Stomach cancer in her father  ,   reports that she has never smoked  She has never used smokeless tobacco  She reports that she drank alcohol  She reports that she does not use drugs  ,  has No Known Allergies     Current Outpatient Medications   Medication Sig Dispense Refill    aspirin 81 mg chewable tablet Chew 81 mg daily      Cyanocobalamin (VITAMIN B-12) 1000 MCG/15ML LIQD Take 4 tablets by mouth daily      dorzolamide-timolol (COSOPT) 22 3-6 8 MG/ML ophthalmic solution       ergocalciferol (VITAMIN D2) 50,000 units TAKE ONE CAPSULE BY MOUTH ONE TIME PER WEEK 12 capsule 0    latanoprost (XALATAN) 0 005 % ophthalmic solution Apply 1 drop to eye      Multiple Vitamins-Minerals (MULTI-B-PLUS) TABS Take by mouth      neomycin-polymyxin-hydrocortisone (CORTISPORIN) otic solution Administer 4 drops to the right ear every 8 (eight) hours 20 mL 3    tacrolimus (PROTOPIC) 0 1 % ointment       TURMERIC PO Take 500 Units by mouth daily      montelukast (SINGULAIR) 10 mg tablet Take 1 tablet (10 mg total) by mouth daily at bedtime 30 tablet 5     No current facility-administered medications for this visit  Review of Systems   Respiratory: Positive for cough  Musculoskeletal: Positive for gait problem  All other systems reviewed and are negative  Objective:  Vitals:    03/05/20 0853   BP: 112/64   Pulse: 58   SpO2: 96%      Physical Exam   Constitutional: She is oriented to person, place, and time  She appears well-developed and well-nourished  Cardiovascular: Normal rate  Pulmonary/Chest: Effort normal    Neurological: She is alert and oriented to person, place, and time  Patient Instructions   A patient with the above symptoms with completely normal examinations  I believe the ataxia is due to a low to grade degree of peripheral neuropathy  I suspect cough variant asthma for the ongoing mild cough  Recommendation is to try using Singulair 10 mg daily  Chest x-ray should be obtained  Follow-up with me in June or July

## 2020-04-09 DIAGNOSIS — E55.9 VITAMIN D DEFICIENCY: ICD-10-CM

## 2020-04-09 RX ORDER — ERGOCALCIFEROL 1.25 MG/1
CAPSULE ORAL
Qty: 12 CAPSULE | Refills: 0 | Status: SHIPPED | OUTPATIENT
Start: 2020-04-09 | End: 2021-08-20 | Stop reason: CLARIF

## 2020-05-05 NOTE — TELEPHONE ENCOUNTER
Pt used caresense to alert me that she had a current question and needed a call from the nurse navigator  I contacted pt, she denied questions at this time, reports "wrong button " pt encouraged to call me with questions, concerns or issues  [employer] please advise  Zainab Funez RN 05/05/20 4:12 PM

## 2020-05-20 ENCOUNTER — TELEPHONE (OUTPATIENT)
Dept: GYNECOLOGIC ONCOLOGY | Facility: CLINIC | Age: 80
End: 2020-05-20

## 2020-05-22 ENCOUNTER — OFFICE VISIT (OUTPATIENT)
Dept: GYNECOLOGIC ONCOLOGY | Facility: CLINIC | Age: 80
End: 2020-05-22
Payer: COMMERCIAL

## 2020-05-22 VITALS
BODY MASS INDEX: 34.96 KG/M2 | TEMPERATURE: 97.5 F | HEIGHT: 62 IN | HEART RATE: 48 BPM | DIASTOLIC BLOOD PRESSURE: 68 MMHG | SYSTOLIC BLOOD PRESSURE: 112 MMHG | WEIGHT: 190 LBS

## 2020-05-22 DIAGNOSIS — Z08 ENCOUNTER FOR FOLLOW-UP SURVEILLANCE OF ENDOMETRIAL CANCER: ICD-10-CM

## 2020-05-22 DIAGNOSIS — Z85.42 ENCOUNTER FOR FOLLOW-UP SURVEILLANCE OF ENDOMETRIAL CANCER: ICD-10-CM

## 2020-05-22 DIAGNOSIS — Z85.42 HISTORY OF ENDOMETRIAL CANCER: Primary | ICD-10-CM

## 2020-05-22 PROCEDURE — 4040F PNEUMOC VAC/ADMIN/RCVD: CPT | Performed by: PHYSICIAN ASSISTANT

## 2020-05-22 PROCEDURE — 1160F RVW MEDS BY RX/DR IN RCRD: CPT | Performed by: PHYSICIAN ASSISTANT

## 2020-05-22 PROCEDURE — 3008F BODY MASS INDEX DOCD: CPT | Performed by: PHYSICIAN ASSISTANT

## 2020-05-22 PROCEDURE — 1036F TOBACCO NON-USER: CPT | Performed by: PHYSICIAN ASSISTANT

## 2020-05-22 PROCEDURE — 99212 OFFICE O/P EST SF 10 MIN: CPT | Performed by: PHYSICIAN ASSISTANT

## 2020-07-06 DIAGNOSIS — J45.20 MILD INTERMITTENT ASTHMA WITHOUT COMPLICATION: ICD-10-CM

## 2020-07-06 RX ORDER — MONTELUKAST SODIUM 10 MG/1
TABLET ORAL
Qty: 90 TABLET | Refills: 1 | Status: SHIPPED | OUTPATIENT
Start: 2020-07-06 | End: 2020-08-04 | Stop reason: SDUPTHER

## 2020-07-29 DIAGNOSIS — Z12.31 ENCOUNTER FOR SCREENING MAMMOGRAM FOR BREAST CANCER: Primary | ICD-10-CM

## 2020-08-04 ENCOUNTER — OFFICE VISIT (OUTPATIENT)
Dept: INTERNAL MEDICINE CLINIC | Facility: CLINIC | Age: 80
End: 2020-08-04
Payer: COMMERCIAL

## 2020-08-04 VITALS
OXYGEN SATURATION: 96 % | DIASTOLIC BLOOD PRESSURE: 70 MMHG | SYSTOLIC BLOOD PRESSURE: 100 MMHG | WEIGHT: 192 LBS | HEART RATE: 68 BPM | TEMPERATURE: 97.7 F | BODY MASS INDEX: 35.12 KG/M2

## 2020-08-04 DIAGNOSIS — J45.20 MILD INTERMITTENT ASTHMA WITHOUT COMPLICATION: Primary | ICD-10-CM

## 2020-08-04 DIAGNOSIS — R27.0 ATAXIA: ICD-10-CM

## 2020-08-04 DIAGNOSIS — Z12.39 BREAST SCREENING: ICD-10-CM

## 2020-08-04 DIAGNOSIS — M81.0 AGE-RELATED OSTEOPOROSIS WITHOUT CURRENT PATHOLOGICAL FRACTURE: ICD-10-CM

## 2020-08-04 DIAGNOSIS — Z85.42 HISTORY OF ENDOMETRIAL CANCER: ICD-10-CM

## 2020-08-04 PROCEDURE — 99214 OFFICE O/P EST MOD 30 MIN: CPT | Performed by: INTERNAL MEDICINE

## 2020-08-04 PROCEDURE — 1160F RVW MEDS BY RX/DR IN RCRD: CPT | Performed by: INTERNAL MEDICINE

## 2020-08-04 PROCEDURE — 4040F PNEUMOC VAC/ADMIN/RCVD: CPT | Performed by: INTERNAL MEDICINE

## 2020-08-04 PROCEDURE — 1170F FXNL STATUS ASSESSED: CPT | Performed by: INTERNAL MEDICINE

## 2020-08-04 PROCEDURE — 3725F SCREEN DEPRESSION PERFORMED: CPT | Performed by: INTERNAL MEDICINE

## 2020-08-04 PROCEDURE — 1125F AMNT PAIN NOTED PAIN PRSNT: CPT | Performed by: INTERNAL MEDICINE

## 2020-08-04 PROCEDURE — G0439 PPPS, SUBSEQ VISIT: HCPCS | Performed by: INTERNAL MEDICINE

## 2020-08-04 PROCEDURE — 1036F TOBACCO NON-USER: CPT | Performed by: INTERNAL MEDICINE

## 2020-08-04 RX ORDER — MONTELUKAST SODIUM 10 MG/1
10 TABLET ORAL
Qty: 90 TABLET | Refills: 1 | Status: SHIPPED | OUTPATIENT
Start: 2020-08-04 | End: 2021-03-26

## 2020-08-04 NOTE — PROGRESS NOTES
Assessment/Plan:       Diagnoses and all orders for this visit:    Mild intermittent asthma without complication  -     montelukast (SINGULAIR) 10 mg tablet; Take 1 tablet (10 mg total) by mouth daily at bedtime  -     Lipid Panel with Direct LDL reflex; Future  -     CBC and differential; Future  -     Comprehensive metabolic panel; Future  -     TSH, 3rd generation with Free T4 reflex; Future  -     Microalbumin / creatinine urine ratio  -     UA (URINE) with reflex to Scope    History of endometrial cancer  -     Lipid Panel with Direct LDL reflex; Future  -     CBC and differential; Future  -     Comprehensive metabolic panel; Future  -     TSH, 3rd generation with Free T4 reflex; Future  -     Microalbumin / creatinine urine ratio  -     UA (URINE) with reflex to Scope    Ataxia    Breast screening    Age-related osteoporosis without current pathological fracture  -     Vitamin D 25 hydroxy; Future  -     DXA bone density spine hip and pelvis; Future                Subjective:      Patient ID: Lincoln Kahn is a 70 y o  female  A 16-year-old female  Endometrial cancer treated at [facility]  She has been experiencing some gait ataxia and the patient herself thinks it is a result of chemotherapy  Intermittent mild intermittent cough variant asthma  This has actually been quite inactive; no recent symptoms  Associated rhinitis  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 extending from calcaneus to the midfoot  History of benign colon polyp  Colonoscopy done earlier in 2018  Mammogram done  on a regular basis has been normal  Lichen simplex chronicus of the feet is treated by Dermatology with variable results  age-related osteoporosis of been reported  Taking vitamin-D long-term  This needs a recheck       Left total knee replacement done early 2019  The patient continues to have significant pain in the left knee with visible inflammation and palpable work    On top of that, she has developed severe pain in the right hip worsened with activity and in particular using stairs  She still states she uses a cane for ambulatory support         She developed visual defect of the right eye  She went to an ophthalmologist who is diagnosis is "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 "  Apparently, the suggestion is that this is due to either hyperlipidemia or hypertension  I spoke with the ophthalmologist and explain that she is not hypertensive nor hyperlipidemic  I reviewed the numbers with him  He agreed that there was no indication to treat with antihypertensive or antihyperlipidemic drugs  And he suggested that the only treatment for this situation would be addition of aspirin 81 mg daily which she had already suggested to the patient  At a distance of about 2 years this has now improved back to a baseline state               The following portions of the patient's history were reviewed and updated as appropriate:   She has a past medical history of Arthritis, Asthma, Benign colon polyp, Esophagitis, reflux, Hemorrhoids, internal, and Sebaceous gland disease  ,  does not have any pertinent problems on file  ,   has a past surgical history that includes Appendectomy; Oophorectomy; Lymph node biopsy; Hysterectomy; Laparoscopy; Knee arthroscopy; Robotic assisted hysterectomy; Wrist surgery; Salpingoophorectomy (Bilateral); Lymphadenectomy; Tubal ligation; HAND FRACTURE REPAIR; Lymphadenectomy; and pr total knee arthroplasty (Left, 2/18/2019)  ,  family history includes Breast cancer (age of onset: 28) in her mother; Cancer in her father and mother; Stomach cancer in her father  ,   reports that she has never smoked  She has never used smokeless tobacco  She reports previous alcohol use  She reports that she does not use drugs  ,  has No Known Allergies     Current Outpatient Medications   Medication Sig Dispense Refill    Cyanocobalamin (VITAMIN B-12) 1000 MCG/15ML LIQD Take 4 tablets by mouth daily      ergocalciferol (VITAMIN D2) 50,000 units TAKE 1 CAPSULE BY MOUTH ONE TIME PER WEEK 12 capsule 0    Multiple Vitamins-Minerals (MULTI-B-PLUS) TABS Take by mouth      TURMERIC PO Take 500 Units by mouth daily      aspirin 81 mg chewable tablet Chew 81 mg daily      dorzolamide-timolol (COSOPT) 22 3-6 8 MG/ML ophthalmic solution       latanoprost (XALATAN) 0 005 % ophthalmic solution Apply 1 drop to eye      montelukast (SINGULAIR) 10 mg tablet Take 1 tablet (10 mg total) by mouth daily at bedtime 90 tablet 1    neomycin-polymyxin-hydrocortisone (CORTISPORIN) otic solution Administer 4 drops to the right ear every 8 (eight) hours 20 mL 3    tacrolimus (PROTOPIC) 0 1 % ointment        No current facility-administered medications for this visit  Review of Systems   Respiratory: Positive for cough  Musculoskeletal: Positive for arthralgias  Skin: Positive for rash  All other systems reviewed and are negative  Objective:  Vitals:    08/04/20 1030   BP: 100/70   Pulse: 68   Temp: 97 7 °F (36 5 °C)   SpO2: 96%      Physical Exam   Constitutional: She is oriented to person, place, and time  She appears well-developed  A female patient who appears to be stated age   HENT:   Head: Normocephalic and atraumatic  Eyes: Pupils are equal, round, and reactive to light  Neck: Normal range of motion  Neck supple  No tracheal deviation present  No thyromegaly present  Cardiovascular: Normal rate, regular rhythm and normal heart sounds  Exam reveals no gallop  No murmur heard  Pulmonary/Chest: No respiratory distress  She has no wheezes  She has no rales  Abdominal: Soft  Bowel sounds are normal  There is no abdominal tenderness  Musculoskeletal: Normal range of motion  General: No tenderness or deformity  Neurological: She is alert and oriented to person, place, and time  Coordination normal    Skin: Skin is warm      Chronic lichenification of the soles of the feet   Psychiatric: Judgment normal          Patient Instructions    A 70year-old doing fairly well multiple diagnoses  Colon screen and breast screen up-to-date  Needs laboratory testing  Yearly follow-up      Repeat bone density

## 2020-08-04 NOTE — PROGRESS NOTES
Assessment and Plan:     Problem List Items Addressed This Visit     None           Preventive health issues were discussed with patient, and age appropriate screening tests were ordered as noted in patient's After Visit Summary  Personalized health advice and appropriate referrals for health education or preventive services given if needed, as noted in patient's After Visit Summary       History of Present Illness:     Patient presents for Medicare Annual Wellness visit    Patient Care Team:  Jose Adair MD as PCP - Zachery Kennedy MD as PCP - [address] [place] [address] (RTE)     Problem List:     Patient Active Problem List   Diagnosis    History of endometrial cancer    Age-related osteoporosis without current pathological fracture    Benign colon polyp    Arthritis    Breast screening    Encounter for immunization    Asthma    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    Health care maintenance    Encounter for follow-up surveillance of endometrial cancer    Rib pain on left side    Frequent falls    Seborrhea    Ataxia    Right hand pain      Past Medical and Surgical History:     Past Medical History:   Diagnosis Date    Arthritis     Asthma     Benign colon polyp     Esophagitis, reflux     Hemorrhoids, internal     Sebaceous gland disease      Past Surgical History:   Procedure Laterality Date    APPENDECTOMY      HAND FRACTURE REPAIR      Open Treatment of Fracture of one Metacarpal Bone    HYSTERECTOMY      KNEE ARTHROSCOPY      Therapeutic    LYMPH NODE BIOPSY      LYMPHADENECTOMY      staging    LYMPHADENECTOMY      Staging    OOPHORECTOMY      PELVIC LAPAROSCOPY      [place] TOTAL KNEE ARTHROPLASTY Left 2/18/2019    Procedure: ARTHROPLASTY KNEE TOTAL;  Surgeon: Jenetta Shone, MD;  Location: [facility] OR;  Service: Orthopedics    ROBOTIC ASSISTED HYSTERECTOMY      SALPINGOOPHORECTOMY Bilateral     TUBAL LIGATION      WRIST SURGERY      open treatment of fracture of one metacarpal bone      Family History:     Family History   Problem Relation Age of Onset   George Drummond Cancer Mother         unknown    Breast cancer Mother 28    Cancer Father     Stomach cancer Father       Social History:     E-Cigarette/Vaping    E-Cigarette Use Never User      E-Cigarette/Vaping Substances    Nicotine No     THC No     CBD No     Flavoring No     Other No     Unknown No      Social History     Socioeconomic History    Marital status: /Civil Union     Spouse name: None    Number of children: None    Years of education: None    Highest education level: None   Occupational History    None   Social Needs    Financial resource strain: None    Food insecurity     Worry: None     Inability: None    Transportation needs     Medical: None     Non-medical: None   Tobacco Use    Smoking status: Never Smoker    Smokeless tobacco: Never Used   Substance and Sexual Activity    Alcohol use: Not Currently    Drug use: No    Sexual activity: Not Currently     Partners: Male   Lifestyle    Physical activity     Days per week: 5 days     Minutes per session: 90 min    Stress:  To some extent   Relationships    Social connections     Talks on phone: None     Gets together: None     Attends Druze service: None     Active member of club or organization: None     Attends meetings of clubs or organizations: None     Relationship status: None    Intimate partner violence     Fear of current or ex partner: None     Emotionally abused: None     Physically abused: None     Forced sexual activity: None   Other Topics Concern    None   Social History Narrative    Housewife or homemaker    Lives with relatives     Live with spouse    Travel to [facility]    Travel to [facility]      Medications and Allergies:     Current Outpatient Medications   Medication Sig Dispense Refill    Cyanocobalamin (VITAMIN B-12) 1000 MCG/15ML LIQD Take 4 tablets by mouth daily      ergocalciferol (VITAMIN D2) 50,000 units TAKE 1 CAPSULE BY MOUTH ONE TIME PER WEEK 12 capsule 0    Multiple Vitamins-Minerals (MULTI-B-PLUS) TABS Take by mouth      TURMERIC PO Take 500 Units by mouth daily      aspirin 81 mg chewable tablet Chew 81 mg daily      dorzolamide-timolol (COSOPT) 22 3-6 8 MG/ML ophthalmic solution       latanoprost (XALATAN) 0 005 % ophthalmic solution Apply 1 drop to eye      montelukast (SINGULAIR) 10 mg tablet TAKE 1 TABLET BY MOUTH DAILY AT BEDTIME (Patient not taking: Reported on 8/4/2020) 90 tablet 1    neomycin-polymyxin-hydrocortisone (CORTISPORIN) otic solution Administer 4 drops to the right ear every 8 (eight) hours 20 mL 3    tacrolimus (PROTOPIC) 0 1 % ointment        No current facility-administered medications for this visit  No Known Allergies   Immunizations:     Immunization History   Administered Date(s) Administered    INFLUENZA 12/01/2014, 01/05/2016, 01/04/2017, 01/08/2018    Influenza Split High Dose Preservative Free IM 12/01/2014, 01/05/2016, 01/04/2017, 01/08/2018    Influenza TIV (IM) 12/12/2013    Influenza, high dose seasonal 0 5 mL 10/23/2018, 11/06/2019    Pneumococcal Conjugate 13-Valent 01/05/2016, 02/10/2017    Pneumococcal Polysaccharide PPV23 10/23/2018    Tdap 12/01/2014    Varicella 02/03/2016      Health Maintenance:         Topic Date Due    DXA SCAN  06/21/2020         Topic Date Due    Influenza Vaccine  07/01/2020      Medicare Health Risk Assessment:     There were no vitals taken for this visit  Ishaan Hackett is here for her Subsequent Wellness visit  Health Risk Assessment:   Patient rates overall health as very good  Patient feels that their physical health rating is same  Eyesight was rated as slightly worse  Hearing was rated as same  Patient feels that their emotional and mental health rating is same  Pain experienced in the last 7 days has been none  Patient states that she has experienced no weight loss or gain in last 6 months  Depression Screening:   PHQ-2 Score: 2      Fall Risk Screening: In the past year, patient has experienced: no history of falling in past year      Urinary Incontinence Screening:   Patient has leaked urine accidently in the last six months  Home Safety:  Patient does not have trouble with stairs inside or outside of their home  Patient has working smoke alarms and has working carbon monoxide detector  Home safety hazards include: none  Nutrition:   Current diet is Regular  Medications:   Patient is currently taking over-the-counter supplements  OTC medications include: see medication list  Patient is able to manage medications  Activities of Daily Living (ADLs)/Instrumental Activities of Daily Living (IADLs):   Walk and transfer into and out of bed and chair?: Yes  Dress and groom yourself?: Yes    Bathe or shower yourself?: Yes    Feed yourself?  Yes  Do your laundry/housekeeping?: Yes  Manage your money, pay your bills and track your expenses?: Yes  Make your own meals?: Yes    Do your own shopping?: Yes    Previous Hospitalizations:   Any hospitalizations or ED visits within the last 12 months?: No      Advance Care Planning:   Living will: No    Advanced directive: No    Advanced directive counseling given: No    Five wishes given: Yes      Cognitive Screening:   Provider or family/friend/caregiver concerned regarding cognition?: No    PREVENTIVE SCREENINGS      Cardiovascular Screening:    General: Screening Current      Colorectal Cancer Screening:     General: Screening Current      Breast Cancer Screening:     General: Screening Current      Cervical Cancer Screening:    General: Screening Not Indicated      Osteoporosis Screening:    General: Screening Not Indicated and History Osteoporosis      Abdominal Aortic Aneurysm (AAA) Screening:        General: Screening Not Indicated      Lung Cancer Screening:     General: Screening Not Indicated      Hepatitis C Screening:    General: Screening Current      Vickey Mckeon MD

## 2020-08-04 NOTE — PATIENT INSTRUCTIONS
A 70year-old doing fairly well multiple diagnoses  Colon screen and breast screen up-to-date  Needs laboratory testing  Yearly follow-up      Repeat bone density

## 2020-08-06 ENCOUNTER — APPOINTMENT (OUTPATIENT)
Dept: LAB | Facility: CLINIC | Age: 80
End: 2020-08-06
Payer: COMMERCIAL

## 2020-08-06 DIAGNOSIS — Z85.42 HISTORY OF ENDOMETRIAL CANCER: ICD-10-CM

## 2020-08-06 DIAGNOSIS — M81.0 AGE-RELATED OSTEOPOROSIS WITHOUT CURRENT PATHOLOGICAL FRACTURE: ICD-10-CM

## 2020-08-06 DIAGNOSIS — J45.20 MILD INTERMITTENT ASTHMA WITHOUT COMPLICATION: ICD-10-CM

## 2020-08-06 LAB
25(OH)D3 SERPL-MCNC: 27 NG/ML (ref 30–100)
ALBUMIN SERPL BCP-MCNC: 3.9 G/DL (ref 3.5–5)
ALP SERPL-CCNC: 62 U/L (ref 46–116)
ALT SERPL W P-5'-P-CCNC: 25 U/L (ref 12–78)
ANION GAP SERPL CALCULATED.3IONS-SCNC: 6 MMOL/L (ref 4–13)
AST SERPL W P-5'-P-CCNC: 19 U/L (ref 5–45)
BACTERIA UR QL AUTO: ABNORMAL /HPF
BASOPHILS # BLD AUTO: 0.05 THOUSANDS/ΜL (ref 0–0.1)
BASOPHILS NFR BLD AUTO: 1 % (ref 0–1)
BILIRUB SERPL-MCNC: 0.78 MG/DL (ref 0.2–1)
BILIRUB UR QL STRIP: NEGATIVE
BUN SERPL-MCNC: 14 MG/DL (ref 5–25)
CALCIUM SERPL-MCNC: 9.4 MG/DL (ref 8.3–10.1)
CHLORIDE SERPL-SCNC: 110 MMOL/L (ref 100–108)
CHOLEST SERPL-MCNC: 211 MG/DL (ref 50–200)
CLARITY UR: CLEAR
CO2 SERPL-SCNC: 27 MMOL/L (ref 21–32)
COLOR UR: YELLOW
CREAT SERPL-MCNC: 0.62 MG/DL (ref 0.6–1.3)
CREAT UR-MCNC: 42.4 MG/DL
EOSINOPHIL # BLD AUTO: 0.13 THOUSAND/ΜL (ref 0–0.61)
EOSINOPHIL NFR BLD AUTO: 3 % (ref 0–6)
ERYTHROCYTE [DISTWIDTH] IN BLOOD BY AUTOMATED COUNT: 13 % (ref 11.6–15.1)
GFR SERPL CREATININE-BSD FRML MDRD: 87 ML/MIN/1.73SQ M
GLUCOSE P FAST SERPL-MCNC: 92 MG/DL (ref 65–99)
GLUCOSE UR STRIP-MCNC: NEGATIVE MG/DL
HCT VFR BLD AUTO: 43.5 % (ref 34.8–46.1)
HDLC SERPL-MCNC: 68 MG/DL
HGB BLD-MCNC: 14.2 G/DL (ref 11.5–15.4)
HGB UR QL STRIP.AUTO: NEGATIVE
HYALINE CASTS #/AREA URNS LPF: ABNORMAL /LPF
IMM GRANULOCYTES # BLD AUTO: 0.01 THOUSAND/UL (ref 0–0.2)
IMM GRANULOCYTES NFR BLD AUTO: 0 % (ref 0–2)
KETONES UR STRIP-MCNC: NEGATIVE MG/DL
LDLC SERPL CALC-MCNC: 126 MG/DL (ref 0–100)
LEUKOCYTE ESTERASE UR QL STRIP: ABNORMAL
LYMPHOCYTES # BLD AUTO: 1.58 THOUSANDS/ΜL (ref 0.6–4.47)
LYMPHOCYTES NFR BLD AUTO: 31 % (ref 14–44)
MCH RBC QN AUTO: 31 PG (ref 26.8–34.3)
MCHC RBC AUTO-ENTMCNC: 32.6 G/DL (ref 31.4–37.4)
MCV RBC AUTO: 95 FL (ref 82–98)
MICROALBUMIN UR-MCNC: <5 MG/L (ref 0–20)
MICROALBUMIN/CREAT 24H UR: <12 MG/G CREATININE (ref 0–30)
MONOCYTES # BLD AUTO: 0.37 THOUSAND/ΜL (ref 0.17–1.22)
MONOCYTES NFR BLD AUTO: 7 % (ref 4–12)
NEUTROPHILS # BLD AUTO: 3.01 THOUSANDS/ΜL (ref 1.85–7.62)
NEUTS SEG NFR BLD AUTO: 58 % (ref 43–75)
NITRITE UR QL STRIP: NEGATIVE
NON-SQ EPI CELLS URNS QL MICRO: ABNORMAL /HPF
NRBC BLD AUTO-RTO: 0 /100 WBCS
PH UR STRIP.AUTO: 6.5 [PH]
PLATELET # BLD AUTO: 204 THOUSANDS/UL (ref 149–390)
PMV BLD AUTO: 10.8 FL (ref 8.9–12.7)
POTASSIUM SERPL-SCNC: 4.3 MMOL/L (ref 3.5–5.3)
PROT SERPL-MCNC: 7.7 G/DL (ref 6.4–8.2)
PROT UR STRIP-MCNC: NEGATIVE MG/DL
RBC # BLD AUTO: 4.58 MILLION/UL (ref 3.81–5.12)
RBC #/AREA URNS AUTO: ABNORMAL /HPF
SODIUM SERPL-SCNC: 143 MMOL/L (ref 136–145)
SP GR UR STRIP.AUTO: 1.01 (ref 1–1.03)
TRIGL SERPL-MCNC: 86 MG/DL
TSH SERPL DL<=0.05 MIU/L-ACNC: 3 UIU/ML (ref 0.36–3.74)
UROBILINOGEN UR QL STRIP.AUTO: 0.2 E.U./DL
WBC # BLD AUTO: 5.15 THOUSAND/UL (ref 4.31–10.16)
WBC #/AREA URNS AUTO: ABNORMAL /HPF

## 2020-08-06 PROCEDURE — 85025 COMPLETE CBC W/AUTO DIFF WBC: CPT

## 2020-08-06 PROCEDURE — 36415 COLL VENOUS BLD VENIPUNCTURE: CPT

## 2020-08-06 PROCEDURE — 82043 UR ALBUMIN QUANTITATIVE: CPT | Performed by: INTERNAL MEDICINE

## 2020-08-06 PROCEDURE — 81001 URINALYSIS AUTO W/SCOPE: CPT | Performed by: INTERNAL MEDICINE

## 2020-08-06 PROCEDURE — 80053 COMPREHEN METABOLIC PANEL: CPT

## 2020-08-06 PROCEDURE — 80061 LIPID PANEL: CPT

## 2020-08-06 PROCEDURE — 82306 VITAMIN D 25 HYDROXY: CPT

## 2020-08-06 PROCEDURE — 82570 ASSAY OF URINE CREATININE: CPT | Performed by: INTERNAL MEDICINE

## 2020-08-06 PROCEDURE — 84443 ASSAY THYROID STIM HORMONE: CPT

## 2020-08-07 ENCOUNTER — HOSPITAL ENCOUNTER (OUTPATIENT)
Dept: MAMMOGRAPHY | Facility: CLINIC | Age: 80
Discharge: HOME | End: 2020-08-07
Payer: COMMERCIAL

## 2020-08-07 DIAGNOSIS — Z12.31 ENCOUNTER FOR SCREENING MAMMOGRAM FOR BREAST CANCER: ICD-10-CM

## 2020-08-07 PROCEDURE — 77067 SCR MAMMO BI INCL CAD: CPT

## 2020-08-07 PROCEDURE — 77063 BREAST TOMOSYNTHESIS BI: CPT

## 2020-09-04 ENCOUNTER — APPOINTMENT (OUTPATIENT)
Dept: RADIOLOGY | Facility: CLINIC | Age: 80
End: 2020-09-04
Payer: COMMERCIAL

## 2020-09-04 ENCOUNTER — OFFICE VISIT (OUTPATIENT)
Dept: OBGYN CLINIC | Facility: CLINIC | Age: 80
End: 2020-09-04
Payer: COMMERCIAL

## 2020-09-04 VITALS
TEMPERATURE: 98.3 F | WEIGHT: 186 LBS | DIASTOLIC BLOOD PRESSURE: 70 MMHG | HEART RATE: 69 BPM | BODY MASS INDEX: 34.23 KG/M2 | SYSTOLIC BLOOD PRESSURE: 106 MMHG | HEIGHT: 62 IN

## 2020-09-04 DIAGNOSIS — M17.11 PRIMARY OSTEOARTHRITIS OF RIGHT KNEE: ICD-10-CM

## 2020-09-04 DIAGNOSIS — M17.11 PRIMARY OSTEOARTHRITIS OF RIGHT KNEE: Primary | ICD-10-CM

## 2020-09-04 PROCEDURE — 73562 X-RAY EXAM OF KNEE 3: CPT

## 2020-09-04 PROCEDURE — 99214 OFFICE O/P EST MOD 30 MIN: CPT | Performed by: FAMILY MEDICINE

## 2020-09-04 PROCEDURE — 20610 DRAIN/INJ JOINT/BURSA W/O US: CPT | Performed by: FAMILY MEDICINE

## 2020-09-04 RX ORDER — NAPROXEN 500 MG/1
500 TABLET ORAL 2 TIMES DAILY WITH MEALS
Qty: 60 TABLET | Refills: 0 | Status: SHIPPED | OUTPATIENT
Start: 2020-09-04 | End: 2021-03-26

## 2020-09-04 RX ORDER — TRIAMCINOLONE ACETONIDE 40 MG/ML
40 INJECTION, SUSPENSION INTRA-ARTICULAR; INTRAMUSCULAR
Status: COMPLETED | OUTPATIENT
Start: 2020-09-04 | End: 2020-09-04

## 2020-09-04 RX ORDER — LIDOCAINE HYDROCHLORIDE 10 MG/ML
4 INJECTION, SOLUTION INFILTRATION; PERINEURAL
Status: COMPLETED | OUTPATIENT
Start: 2020-09-04 | End: 2020-09-04

## 2020-09-04 RX ORDER — LIDOCAINE HYDROCHLORIDE 10 MG/ML
2 INJECTION, SOLUTION INFILTRATION; PERINEURAL
Status: COMPLETED | OUTPATIENT
Start: 2020-09-04 | End: 2020-09-04

## 2020-09-04 RX ADMIN — LIDOCAINE HYDROCHLORIDE 4 ML: 10 INJECTION, SOLUTION INFILTRATION; PERINEURAL at 12:16

## 2020-09-04 RX ADMIN — TRIAMCINOLONE ACETONIDE 40 MG: 40 INJECTION, SUSPENSION INTRA-ARTICULAR; INTRAMUSCULAR at 12:16

## 2020-09-04 RX ADMIN — LIDOCAINE HYDROCHLORIDE 2 ML: 10 INJECTION, SOLUTION INFILTRATION; PERINEURAL at 12:16

## 2020-09-04 NOTE — PROGRESS NOTES
Assessment/Plan:  Assessment/Plan   Diagnoses and all orders for this visit:    Primary osteoarthritis of right knee  -     XR knee 3 vw right non injury; Future  -     Large joint arthrocentesis: R knee  -     naproxen (NAPROSYN) 500 mg tablet; Take 1 tablet (500 mg total) by mouth 2 (two) times a day with meals        68-year-old female with right knee pain of onset 1 day ago  Discussed with patient physical exam, radiographs, impression and plan  X-rays noted for mild medial joint space narrowing of the right knee  Physical noted for swelling of the knee  She has tenderness at the medial femoral condyle and medial joint line  She has range of motion limited extension of -5° and flexion to 120°  There is no appreciable collateral ligament laxity  There is negative patellar inhibition and grind  There is no groin pain with FOX and FADDIR maneuvers of the hips  Clinic impression that she may have flare of osteoarthritis  I discussed regimen of anti-inflammatory, continuing supplements, and corticosteroid injection to which she agreed  I administered mixture of 4 cc 1% lidocaine and 1 cc Kenalog to the right knee without complication  She is to take naproxen 500 mg twice daily with with food consistently for 2 weeks  She will follow up as needed  Subjective:   Patient ID: Zelda Fontaine is a 70 y o  female  Chief Complaint   Patient presents with    Right Knee - Pain       68-year-old female presents for evaluation of right knee pain of 1 day duration  She states that she went for a walk and while she was walking had onset of pain  Pain described as sudden onset, generalized to the knee but worse at the medial aspect, severe intensity, non radiating, worse with standing and ambulating, associated swelling, and improved with resting  Yesterday she took Tylenol, Aleve, elevated, and applied ice  She denies any pain while at rest     Knee Pain   This is a new problem   The current episode started yesterday  The problem occurs daily  The problem has been rapidly worsening  Associated symptoms include arthralgias and joint swelling  Pertinent negatives include no abdominal pain, chest pain, chills, fever, numbness, rash, sore throat or weakness  The symptoms are aggravated by standing, walking, twisting and bending  She has tried rest, NSAIDs, ice and acetaminophen for the symptoms  The treatment provided mild relief  Review of Systems   Constitutional: Negative for chills and fever  HENT: Negative for sore throat  Eyes: Negative for visual disturbance  Respiratory: Negative for shortness of breath  Cardiovascular: Negative for chest pain  Gastrointestinal: Negative for abdominal pain  Genitourinary: Negative for flank pain  Musculoskeletal: Positive for arthralgias and joint swelling  Skin: Negative for rash and wound  Neurological: Negative for weakness and numbness  Hematological: Does not bruise/bleed easily  Psychiatric/Behavioral: Negative for self-injury  Objective:  Vitals:    09/04/20 1124   BP: 106/70   Pulse: 69   Temp: 98 3 °F (36 8 °C)   Weight: 84 4 kg (186 lb)   Height: 5' 2" (1 575 m)     Right Knee Exam     Muscle Strength   The patient has normal right knee strength  Tenderness   The patient is experiencing tenderness in the medial joint line  Range of Motion   Extension: -5   Flexion: 120     Tests   Varus: negative Valgus: negative    Other   Swelling: mild    Comments:  Negative patellar inhibition and grind      Right Hip Exam     Muscle Strength   Flexion: 5/5     Tests   FOX: negative    Comments:  Negative FADDIR      Left Hip Exam     Muscle Strength   Flexion: 5/5     Tests   FOX: negative    Comments:  Negative FADDIR            Physical Exam  Vitals signs and nursing note reviewed  Constitutional:       General: She is not in acute distress  Appearance: She is well-developed  She is not ill-appearing or diaphoretic  HENT:      Head: Normocephalic  Eyes:      Conjunctiva/sclera: Conjunctivae normal    Neck:      Trachea: No tracheal deviation  Cardiovascular:      Rate and Rhythm: Normal rate  Pulmonary:      Effort: Pulmonary effort is normal  No respiratory distress  Abdominal:      General: There is no distension  Musculoskeletal:         General: Swelling and tenderness present  No deformity or signs of injury  Right lower leg: No edema  Left lower leg: No edema  Skin:     General: Skin is warm and dry  Coloration: Skin is not jaundiced or pale  Neurological:      Mental Status: She is alert and oriented to person, place, and time  Psychiatric:         Mood and Affect: Mood normal          Behavior: Behavior normal          Thought Content: Thought content normal          Judgment: Judgment normal          I have personally reviewed pertinent films in PACS and my interpretation is Mild medial joint space narrowing      Large joint arthrocentesis: R knee  Date/Time: 9/4/2020 12:16 PM  Consent given by: patient  Site marked: site marked  Timeout: Immediately prior to procedure a time out was called to verify the correct patient, procedure, equipment, support staff and site/side marked as required   Supporting Documentation  Indications: pain   Procedure Details  Location: knee - R knee  Preparation: Patient was prepped and draped in the usual sterile fashion  Needle size: 22 G  Ultrasound guidance: no  Approach: anterolateral  Medications administered: 2 mL lidocaine 1 %; 4 mL lidocaine 1 %; 40 mg triamcinolone acetonide 40 mg/mL    Patient tolerance: patient tolerated the procedure well with no immediate complications  Dressing:  Sterile dressing applied

## 2020-09-04 NOTE — LETTER
September 4, 2020     Johnny Mireles MD  [address] [place] [id number]    Patient: Allen Capellan   YOB: 1940   Date of Visit: 9/4/2020       Dear Dr Clifford Yates: Thank you for referring Allen Capellan to me for evaluation  Below are my notes for this consultation  If you have questions, please do not hesitate to call me  I look forward to following your patient along with you  Sincerely,        [employer], DO        CC: No Recipients  [employer], DO  9/4/2020  4:47 PM  Sign when Signing Visit  Assessment/Plan:  Assessment/Plan   Diagnoses and all orders for this visit:    Primary osteoarthritis of right knee  -     XR knee 3 vw right non injury; Future  -     Large joint arthrocentesis: R knee  -     naproxen (NAPROSYN) 500 mg tablet; Take 1 tablet (500 mg total) by mouth 2 (two) times a day with meals        79-year-old female with right knee pain of onset 1 day ago  Discussed with patient physical exam, radiographs, impression and plan  X-rays noted for mild medial joint space narrowing of the right knee  Physical noted for swelling of the knee  She has tenderness at the medial femoral condyle and medial joint line  She has range of motion limited extension of -5° and flexion to 120°  There is no appreciable collateral ligament laxity  There is negative patellar inhibition and grind  There is no groin pain with FOX and FADDIR maneuvers of the hips  Clinic impression that she may have flare of osteoarthritis  I discussed regimen of anti-inflammatory, continuing supplements, and corticosteroid injection to which she agreed  I administered mixture of 4 cc 1% lidocaine and 1 cc Kenalog to the right knee without complication  She is to take naproxen 500 mg twice daily with with food consistently for 2 weeks  She will follow up as needed  Subjective:   Patient ID: Allen Capellan is a 70 y o  female    Chief Complaint   Patient presents with    Right Knee - Pain       32-year-old female presents for evaluation of right knee pain of 1 day duration  She states that she went for a walk and while she was walking had onset of pain  Pain described as sudden onset, generalized to the knee but worse at the medial aspect, severe intensity, non radiating, worse with standing and ambulating, associated swelling, and improved with resting  Yesterday she took Tylenol, Aleve, elevated, and applied ice  She denies any pain while at rest     Knee Pain   This is a new problem  The current episode started yesterday  The problem occurs daily  The problem has been rapidly worsening  Associated symptoms include arthralgias and joint swelling  Pertinent negatives include no abdominal pain, chest pain, chills, fever, numbness, rash, sore throat or weakness  The symptoms are aggravated by standing, walking, twisting and bending  She has tried rest, NSAIDs, ice and acetaminophen for the symptoms  The treatment provided mild relief  Review of Systems   Constitutional: Negative for chills and fever  HENT: Negative for sore throat  Eyes: Negative for visual disturbance  Respiratory: Negative for shortness of breath  Cardiovascular: Negative for chest pain  Gastrointestinal: Negative for abdominal pain  Genitourinary: Negative for flank pain  Musculoskeletal: Positive for arthralgias and joint swelling  Skin: Negative for rash and wound  Neurological: Negative for weakness and numbness  Hematological: Does not bruise/bleed easily  Psychiatric/Behavioral: Negative for self-injury  Objective:  Vitals:    09/04/20 1124   BP: 106/70   Pulse: 69   Temp: 98 3 °F (36 8 °C)   Weight: 84 4 kg (186 lb)   Height: 5' 2" (1 575 m)     Right Knee Exam     Muscle Strength   The patient has normal right knee strength  Tenderness   The patient is experiencing tenderness in the medial joint line      Range of Motion   Extension: -5   Flexion: 120     Tests Varus: negative Valgus: negative    Other   Swelling: mild    Comments:  Negative patellar inhibition and grind      Right Hip Exam     Muscle Strength   Flexion: 5/5     Tests   FOX: negative    Comments:  Negative FADDIR      Left Hip Exam     Muscle Strength   Flexion: 5/5     Tests   FOX: negative    Comments:  Negative FADDIR            Physical Exam  Vitals signs and nursing note reviewed  Constitutional:       General: She is not in acute distress  Appearance: She is well-developed  She is not ill-appearing or diaphoretic  HENT:      Head: Normocephalic  Eyes:      Conjunctiva/sclera: Conjunctivae normal    Neck:      Trachea: No tracheal deviation  Cardiovascular:      Rate and Rhythm: Normal rate  Pulmonary:      Effort: Pulmonary effort is normal  No respiratory distress  Abdominal:      General: There is no distension  Musculoskeletal:         General: Swelling and tenderness present  No deformity or signs of injury  Right lower leg: No edema  Left lower leg: No edema  Skin:     General: Skin is warm and dry  Coloration: Skin is not jaundiced or pale  Neurological:      Mental Status: She is alert and oriented to person, place, and time  Psychiatric:         Mood and Affect: Mood normal          Behavior: Behavior normal          Thought Content: Thought content normal          Judgment: Judgment normal          I have personally reviewed pertinent films in PACS and my interpretation is Mild medial joint space narrowing      Large joint arthrocentesis: R knee  Date/Time: 9/4/2020 12:16 PM  Consent given by: patient  Site marked: site marked  Timeout: Immediately prior to procedure a time out was called to verify the correct patient, procedure, equipment, support staff and site/side marked as required   Supporting Documentation  Indications: pain   Procedure Details  Location: knee - R knee  Preparation: Patient was prepped and draped in the usual sterile fashion  Needle size: 22 G  Ultrasound guidance: no  Approach: anterolateral  Medications administered: 2 mL lidocaine 1 %; 4 mL lidocaine 1 %; 40 mg triamcinolone acetonide 40 mg/mL    Patient tolerance: patient tolerated the procedure well with no immediate complications  Dressing:  Sterile dressing applied

## 2020-09-14 ENCOUNTER — OFFICE VISIT (OUTPATIENT)
Dept: OBGYN CLINIC | Facility: CLINIC | Age: 80
End: 2020-09-14
Payer: COMMERCIAL

## 2020-09-14 VITALS
DIASTOLIC BLOOD PRESSURE: 79 MMHG | SYSTOLIC BLOOD PRESSURE: 117 MMHG | HEART RATE: 79 BPM | WEIGHT: 186 LBS | TEMPERATURE: 98.7 F | BODY MASS INDEX: 34.23 KG/M2 | HEIGHT: 62 IN

## 2020-09-14 DIAGNOSIS — S89.91XD ACUTE INJURY OF RIGHT KNEE CARTILAGE, SUBSEQUENT ENCOUNTER: Primary | ICD-10-CM

## 2020-09-14 PROCEDURE — 99213 OFFICE O/P EST LOW 20 MIN: CPT | Performed by: FAMILY MEDICINE

## 2020-09-14 RX ORDER — TRAMADOL HYDROCHLORIDE 50 MG/1
50 TABLET ORAL 2 TIMES DAILY PRN
Qty: 10 TABLET | Refills: 0 | Status: SHIPPED | OUTPATIENT
Start: 2020-09-14 | End: 2020-10-09 | Stop reason: SDUPTHER

## 2020-09-14 RX ORDER — IBUPROFEN 600 MG/1
600 TABLET ORAL EVERY 8 HOURS PRN
Qty: 90 TABLET | Refills: 0 | Status: SHIPPED | OUTPATIENT
Start: 2020-09-14 | End: 2021-08-20 | Stop reason: CLARIF

## 2020-09-14 NOTE — PROGRESS NOTES
Assessment/Plan:  Assessment/Plan   Diagnoses and all orders for this visit:    Acute injury of right knee cartilage, subsequent encounter  -     MRI knee right  wo contrast; Future  -     ibuprofen (MOTRIN) 600 mg tablet; Take 1 tablet (600 mg total) by mouth every 8 (eight) hours as needed for mild pain or moderate pain  -     traMADol (ULTRAM) 50 mg tablet; Take 1 tablet (50 mg total) by mouth 2 (two) times a day as needed for moderate pain        70year-old female with right knee pain of 11 days duration  Discussed the patient's physical exam, impression, and plan  Physical exam of the right knee is noted for medial swelling  She has tenderness at the medial femoral condyle, medial tibial plateau, medial joint line  She has range of motion limited to extension of -5° and flexion to 100°  There is no appreciable collateral ligament laxity  There is positive Dannie's at the medial aspect of the knee  Her knee pain has significantly worsened following corticosteroid injection, and she is experiencing knee instability  There is limited range of motion  There is concern for internal derangement particularly meniscus tear with flipped fragment, and cartilage defect with loose body  At this time I will refer her for MRI of the knee to evaluate for occult osseous and soft tissue abnormality, as prompt invasive management may be warranted  She will follow up with me after getting MRI done  Subjective:   Patient ID: Braulio Newby is a 70 y o  female  Chief Complaint   Patient presents with    Right Knee - Follow-up, Pain       65-year-old female following up her right knee pain of 10 days duration  She was last seen by me ten days ago at which point she was given corticosteroid injection, prescribed naproxen 500 mg twice daily  She reports that following the injection her pain has significantly increased and she has more limited range of motion    She has pain described as the localized to the medial aspect of the knee, throbbing and sharp, non radiating, constant, associated with swelling, worse with bending and bearing weight, associated with limited range of motion, and improved with resting  She has also been taking naproxen 500 mg twice daily which has not been helping with her pain  Knee Pain   This is a new problem  The current episode started 1 to 4 weeks ago  The problem occurs constantly  The problem has been rapidly worsening  Associated symptoms include arthralgias and joint swelling  Pertinent negatives include no numbness or weakness  The symptoms are aggravated by standing, walking and twisting  She has tried NSAIDs and position changes (Corticosteroid injection) for the symptoms  The treatment provided no relief  Review of Systems   Musculoskeletal: Positive for arthralgias and joint swelling  Neurological: Negative for weakness and numbness  Objective:  Vitals:    09/14/20 1315   BP: 117/79   Pulse: 79   Temp: 98 7 °F (37 1 °C)   Weight: 84 4 kg (186 lb)   Height: 5' 2" (1 575 m)     Right Knee Exam     Muscle Strength   The patient has normal right knee strength  Tenderness   The patient is experiencing tenderness in the medial joint line (Medial femoral condyle, medial tibial plateau)  Range of Motion   Extension: -5   Flexion: 100     Tests   Dannie:  Medial - positive   Varus: negative Valgus: negative    Other   Swelling: moderate            Physical Exam  Vitals signs and nursing note reviewed  Constitutional:       General: She is not in acute distress  Appearance: She is well-developed  HENT:      Head: Normocephalic  Eyes:      Conjunctiva/sclera: Conjunctivae normal    Neck:      Trachea: No tracheal deviation  Cardiovascular:      Rate and Rhythm: Normal rate  Pulmonary:      Effort: Pulmonary effort is normal  No respiratory distress  Abdominal:      General: There is no distension     Musculoskeletal:         General: Swelling and tenderness present  Skin:     General: Skin is warm and dry  Neurological:      Mental Status: She is alert and oriented to person, place, and time     Psychiatric:         Behavior: Behavior normal

## 2020-09-17 ENCOUNTER — OFFICE VISIT (OUTPATIENT)
Dept: OBGYN CLINIC | Facility: CLINIC | Age: 80
End: 2020-09-17
Payer: COMMERCIAL

## 2020-09-17 ENCOUNTER — HOSPITAL ENCOUNTER (OUTPATIENT)
Dept: MRI IMAGING | Facility: HOSPITAL | Age: 80
Discharge: HOME | End: 2020-09-17
Attending: FAMILY MEDICINE
Payer: COMMERCIAL

## 2020-09-17 VITALS
BODY MASS INDEX: 35.33 KG/M2 | HEIGHT: 62 IN | SYSTOLIC BLOOD PRESSURE: 120 MMHG | TEMPERATURE: 98.9 F | HEART RATE: 68 BPM | DIASTOLIC BLOOD PRESSURE: 77 MMHG | WEIGHT: 192 LBS

## 2020-09-17 DIAGNOSIS — M17.11 PRIMARY OSTEOARTHRITIS OF RIGHT KNEE: Primary | ICD-10-CM

## 2020-09-17 DIAGNOSIS — M89.9 OSTEOCHONDRAL LESION: ICD-10-CM

## 2020-09-17 DIAGNOSIS — S83.231D COMPLEX TEAR OF MEDIAL MENISCUS OF RIGHT KNEE AS CURRENT INJURY, SUBSEQUENT ENCOUNTER: ICD-10-CM

## 2020-09-17 DIAGNOSIS — S89.91XD ACUTE INJURY OF RIGHT KNEE CARTILAGE, SUBSEQUENT ENCOUNTER: ICD-10-CM

## 2020-09-17 DIAGNOSIS — M94.9 OSTEOCHONDRAL LESION: ICD-10-CM

## 2020-09-17 PROCEDURE — 99213 OFFICE O/P EST LOW 20 MIN: CPT | Performed by: FAMILY MEDICINE

## 2020-09-17 PROCEDURE — 73721 MRI JNT OF LWR EXTRE W/O DYE: CPT

## 2020-09-17 PROCEDURE — G1004 CDSM NDSC: HCPCS

## 2020-09-17 NOTE — PROGRESS NOTES
Assessment/Plan:  Assessment/Plan   Diagnoses and all orders for this visit:    Primary osteoarthritis of right knee  -     Injection procedure prior authorization; Future    Complex tear of medial meniscus of right knee as current injury, subsequent encounter  -     Injection procedure prior authorization; Future    Osteochondral lesion  -     T-Rom  Post Op Knee Brace        65-year-old female right knee pain of 2 weeks duration  Discussed with patient MRI results, impression and plan  MRI of the right knee noted for complex tear at the junction of the body and posterior horn medial meniscus with meniscal extrusion, tricompartmental degenerative change with full-thickness cartilage loss medial compartment, osteochondral lesion lateral trochlea without evidence of instability  Clinical impression is that she is symptomatic from combination of osteochondral lesion and progressive osteoarthritis  Since she did not improved with corticosteroid injection I discussed treatment option of viscosupplementation to which she agreed  We will request authorization for one-shot viscosupplementation injection  Since she has worsening pain with full extension of knee and flexion beyond 100° I will place in T ROM knee brace limited to flexion 90° and extension of -30°  She is to continue alternating between ibuprofen, extra-strength Tylenol, and tramadol for pain  She will follow up for injection once approved  Subjective:   Patient ID: Lincoln Kahn is a 70 y o  female  Chief Complaint   Patient presents with    Right Knee - Follow-up, Pain       65-year-old female follow-up for right knee pain of 2 weeks duration  She was last seen by me 3 days ago at which point she was referred for MRI of the right knee  She received corticosteroid injection 2 weeks ago and following injection had worsening pain    She reports having pain described as generalized to the knee but worse at the anterior and medial aspects, throbbing and sometimes sharp, worse with bending and twisting, worse with standing and ambulation, associated swelling, and improved with resting or changing position  She has been alternating between ibuprofen and tramadol which provide temporary improvement in her symptoms  She has been ambulating with walking cane and walker  Knee Pain   This is a new problem  The current episode started 1 to 4 weeks ago  The problem occurs constantly  The problem has been gradually worsening  Associated symptoms include arthralgias and joint swelling  Pertinent negatives include no numbness or weakness  The symptoms are aggravated by standing, twisting, walking and bending  She has tried rest, NSAIDs, oral narcotics and position changes for the symptoms  The treatment provided mild relief  Review of Systems   Musculoskeletal: Positive for arthralgias and joint swelling  Neurological: Negative for weakness and numbness  Objective:  Vitals:    09/17/20 1553   BP: 120/77   Pulse: 68   Temp: 98 9 °F (37 2 °C)   Weight: 87 1 kg (192 lb)   Height: 5' 2" (1 575 m)     Right Knee Exam     Range of Motion   Extension: -5   Flexion: 100             Physical Exam  Vitals signs and nursing note reviewed  Constitutional:       General: She is not in acute distress  Appearance: She is well-developed  HENT:      Head: Normocephalic  Eyes:      Conjunctiva/sclera: Conjunctivae normal    Neck:      Trachea: No tracheal deviation  Cardiovascular:      Rate and Rhythm: Normal rate  Pulmonary:      Effort: Pulmonary effort is normal  No respiratory distress  Abdominal:      General: There is no distension  Musculoskeletal:         General: Swelling and tenderness present  Skin:     General: Skin is warm and dry  Neurological:      Mental Status: She is alert and oriented to person, place, and time     Psychiatric:         Behavior: Behavior normal          I have personally reviewed pertinent films in PACS and my interpretation is Osteoarthritis and medial meniscus tear

## 2020-10-04 DIAGNOSIS — S89.91XD ACUTE INJURY OF RIGHT KNEE CARTILAGE, SUBSEQUENT ENCOUNTER: ICD-10-CM

## 2020-10-09 ENCOUNTER — OFFICE VISIT (OUTPATIENT)
Dept: OBGYN CLINIC | Facility: CLINIC | Age: 80
End: 2020-10-09
Payer: COMMERCIAL

## 2020-10-09 ENCOUNTER — TELEPHONE (OUTPATIENT)
Dept: OBGYN CLINIC | Facility: CLINIC | Age: 80
End: 2020-10-09

## 2020-10-09 VITALS
HEIGHT: 62 IN | BODY MASS INDEX: 35.15 KG/M2 | DIASTOLIC BLOOD PRESSURE: 82 MMHG | WEIGHT: 191 LBS | HEART RATE: 69 BPM | TEMPERATURE: 97.5 F | SYSTOLIC BLOOD PRESSURE: 140 MMHG

## 2020-10-09 DIAGNOSIS — S89.91XD ACUTE INJURY OF RIGHT KNEE CARTILAGE, SUBSEQUENT ENCOUNTER: ICD-10-CM

## 2020-10-09 DIAGNOSIS — S83.231D COMPLEX TEAR OF MEDIAL MENISCUS OF RIGHT KNEE AS CURRENT INJURY, SUBSEQUENT ENCOUNTER: ICD-10-CM

## 2020-10-09 DIAGNOSIS — M94.9 OSTEOCHONDRAL LESION: Primary | ICD-10-CM

## 2020-10-09 DIAGNOSIS — M89.9 OSTEOCHONDRAL LESION: Primary | ICD-10-CM

## 2020-10-09 DIAGNOSIS — M17.11 PRIMARY OSTEOARTHRITIS OF RIGHT KNEE: ICD-10-CM

## 2020-10-09 PROCEDURE — 20610 DRAIN/INJ JOINT/BURSA W/O US: CPT | Performed by: FAMILY MEDICINE

## 2020-10-09 PROCEDURE — 99213 OFFICE O/P EST LOW 20 MIN: CPT | Performed by: FAMILY MEDICINE

## 2020-10-09 PROCEDURE — 1160F RVW MEDS BY RX/DR IN RCRD: CPT | Performed by: FAMILY MEDICINE

## 2020-10-09 PROCEDURE — 1036F TOBACCO NON-USER: CPT | Performed by: FAMILY MEDICINE

## 2020-10-09 RX ORDER — TRAMADOL HYDROCHLORIDE 50 MG/1
50 TABLET ORAL 2 TIMES DAILY PRN
Qty: 10 TABLET | Refills: 0 | Status: SHIPPED | OUTPATIENT
Start: 2020-10-09 | End: 2021-03-26

## 2020-10-09 RX ADMIN — LIDOCAINE HYDROCHLORIDE 3 ML: 10 INJECTION, SOLUTION INFILTRATION; PERINEURAL at 11:25

## 2020-10-12 ENCOUNTER — TELEPHONE (OUTPATIENT)
Dept: INTERNAL MEDICINE CLINIC | Facility: CLINIC | Age: 80
End: 2020-10-12

## 2020-10-13 RX ORDER — LIDOCAINE HYDROCHLORIDE 10 MG/ML
3 INJECTION, SOLUTION INFILTRATION; PERINEURAL
Status: COMPLETED | OUTPATIENT
Start: 2020-10-09 | End: 2020-10-09

## 2020-11-04 ENCOUNTER — CLINICAL SUPPORT (OUTPATIENT)
Dept: INTERNAL MEDICINE CLINIC | Facility: CLINIC | Age: 80
End: 2020-11-04
Payer: COMMERCIAL

## 2020-11-04 DIAGNOSIS — Z23 NEED FOR INFLUENZA VACCINATION: Primary | ICD-10-CM

## 2020-11-04 PROCEDURE — G0008 ADMIN INFLUENZA VIRUS VAC: HCPCS

## 2020-11-04 PROCEDURE — 90662 IIV NO PRSV INCREASED AG IM: CPT

## 2020-11-05 ENCOUNTER — HOSPITAL ENCOUNTER (OUTPATIENT)
Dept: MAMMOGRAPHY | Facility: CLINIC | Age: 80
Discharge: HOME | End: 2020-11-05
Payer: COMMERCIAL

## 2020-11-05 DIAGNOSIS — M81.0 AGE-RELATED OSTEOPOROSIS WITHOUT CURRENT PATHOLOGICAL FRACTURE: ICD-10-CM

## 2020-11-05 PROCEDURE — 77080 DXA BONE DENSITY AXIAL: CPT

## 2020-11-06 ENCOUNTER — TELEPHONE (OUTPATIENT)
Dept: INTERNAL MEDICINE CLINIC | Facility: CLINIC | Age: 80
End: 2020-11-06

## 2020-11-09 RX ORDER — IBUPROFEN 600 MG/1
600 TABLET ORAL EVERY 8 HOURS PRN
Qty: 90 TABLET | Refills: 0 | OUTPATIENT
Start: 2020-11-09

## 2020-11-23 ENCOUNTER — TELEPHONE (OUTPATIENT)
Dept: GYNECOLOGIC ONCOLOGY | Facility: CLINIC | Age: 80
End: 2020-11-23

## 2020-12-04 ENCOUNTER — OFFICE VISIT (OUTPATIENT)
Dept: GYNECOLOGIC ONCOLOGY | Facility: CLINIC | Age: 80
End: 2020-12-04
Payer: COMMERCIAL

## 2020-12-04 VITALS
SYSTOLIC BLOOD PRESSURE: 132 MMHG | HEART RATE: 62 BPM | RESPIRATION RATE: 17 BRPM | BODY MASS INDEX: 34.78 KG/M2 | TEMPERATURE: 98.5 F | HEIGHT: 62 IN | DIASTOLIC BLOOD PRESSURE: 76 MMHG | WEIGHT: 189 LBS

## 2020-12-04 DIAGNOSIS — Z85.42 HISTORY OF ENDOMETRIAL CANCER: Primary | ICD-10-CM

## 2020-12-04 DIAGNOSIS — Z12.31 ENCOUNTER FOR SCREENING MAMMOGRAM FOR BREAST CANCER: ICD-10-CM

## 2020-12-04 DIAGNOSIS — Z85.42 ENCOUNTER FOR FOLLOW-UP SURVEILLANCE OF ENDOMETRIAL CANCER: ICD-10-CM

## 2020-12-04 DIAGNOSIS — Z08 ENCOUNTER FOR FOLLOW-UP SURVEILLANCE OF ENDOMETRIAL CANCER: ICD-10-CM

## 2020-12-04 PROCEDURE — 1036F TOBACCO NON-USER: CPT | Performed by: PHYSICIAN ASSISTANT

## 2020-12-04 PROCEDURE — 99212 OFFICE O/P EST SF 10 MIN: CPT | Performed by: PHYSICIAN ASSISTANT

## 2020-12-04 PROCEDURE — 1160F RVW MEDS BY RX/DR IN RCRD: CPT | Performed by: PHYSICIAN ASSISTANT

## 2021-01-18 ENCOUNTER — EVALUATION (OUTPATIENT)
Dept: PHYSICAL THERAPY | Facility: CLINIC | Age: 81
End: 2021-01-18
Payer: COMMERCIAL

## 2021-01-18 DIAGNOSIS — G89.29 CHRONIC PAIN OF RIGHT KNEE: Primary | ICD-10-CM

## 2021-01-18 DIAGNOSIS — M25.561 CHRONIC PAIN OF RIGHT KNEE: Primary | ICD-10-CM

## 2021-01-18 DIAGNOSIS — Z96.651 STATUS POST RIGHT KNEE REPLACEMENT: ICD-10-CM

## 2021-01-18 PROCEDURE — 97162 PT EVAL MOD COMPLEX 30 MIN: CPT | Performed by: PHYSICAL THERAPIST

## 2021-01-18 PROCEDURE — 97110 THERAPEUTIC EXERCISES: CPT | Performed by: PHYSICAL THERAPIST

## 2021-01-18 NOTE — LETTER
2021    Evette Jasso [address] [place] [address]  [address] [facility] ([place] [id number]    Patient: Val Vargas   YOB: 1940   Date of Visit: 2021     Encounter Diagnosis     ICD-10-CM    1  Chronic pain of right knee  M25 561     G89 29    2  Status post right knee replacement  [id number]        Dear Dr Radha Torres:    Thank you for your recent referral of Val Vargas  Please review the attached evaluation summary from Melinda's recent visit  Please verify that you agree with the plan of care by signing the attached order  If you have any questions or concerns, please do not hesitate to call  I sincerely appreciate the opportunity to share in the care of one of your patients and hope to have another opportunity to work with you in the near future  Sincerely,    Annita Philippe, PT      Referring Provider:      I certify that I have read the below Plan of Care and certify the need for these services furnished under this plan of treatment while under my care  Evette Jasso MD  [address] [address]  Via Fax: [phone]          PT Evaluation     Today's date: 2021  Patient name: Val Vargas  : 1940  MRN: [id number]  Referring provider: Marianna Dhillon MD  Dx:   Encounter Diagnosis     ICD-10-CM    1  Chronic pain of right knee  M25 561     G89 29    2  Status post right knee replacement  [id number]                   Assessment  Assessment details: Patient is a 67 y/o female s/p R TKA on 20  Patient presents with decreased functional mobility due to increased knee pain, decreased knee strength, decreased knee ROM, gait deviations requiring SPC associated with aforementioned surgical procedure  Due to self reports of 8/10 pain, calf pain when walking, and tenderness to touch throughout right calf, recommend patient go to ER for Doppler to r/o DVT    Called referring physician's office and spoke with nurse Jomar Umana who will leave message for physician  Following negative Doppler, Patient will benefit from skilled physical therapy to address impairment and improve functional mobility  PT needed to allow for return to maximal function and improve quality of life  Impairments: abnormal or restricted ROM, activity intolerance, impaired physical strength, lacks appropriate home exercise program and pain with function  Understanding of Dx/Px/POC: good   Prognosis: good    Goals  STG within 4 weeks:   1  Patient to be independent in HEP  2  Reduce pain by 50% to improve quality of life  3  Improve knee flexion to 120 degrees to improve stairs and gait  4  Improve knee extension to 0 degrees to improve gait  LTG within 8 weeks:   1  Patient to be independent in ADLs/IADLs without difficulty  2  Improve knee strength to at least 4    3  Patient to be able to ambulate community distances with appropriate assistive device  4  Patient to be able to perform reciprocal stairs  Plan  Plan details: Recommend patient go to ER for doppler US to r/o DVT  Patient instructed to call clinic with questions or concerns  Encouraged to call physician's office with any questions or for further instruction  Patient "on hold" until cleared by physician or negative Doppler  Patient denies having any questions following visit and is appreciative of service  In agreement with going to ER     Patient would benefit from: skilled physical therapy and PT eval  Planned modality interventions: cryotherapy, hydrotherapy and unattended electrical stimulation  Planned therapy interventions: therapeutic training, therapeutic exercise, therapeutic activities, stretching, strengthening, postural training, patient education, neuromuscular re-education, manual therapy, joint mobilization, IADL retraining, activity modification, ADL retraining, ADL training, body mechanics training, flexibility, functional ROM exercises, gait training, graded activity, graded exercise, graded motor, home exercise program and balance/weight bearing training  Frequency: 2x week  Duration in weeks: 8  Plan of Care beginning date: 2021  Plan of Care expiration date: 3/15/2021  Treatment plan discussed with: patient        Subjective Evaluation    History of Present Illness  Mechanism of injury: Patient is a 67 y/o female s/p right TKA on 20  She states she started having pain 2020 and failed conservative treatment of injection and home exercises  She was ultimately scheduled for a right TKA on 20  She has not had any home health and has not been back to the doctor due to Covid  She opted to do her own home exercises provided by the surgeon and has followed up with surgeon via televisit  She requested PT and was provided with a script for PT  She arrives today with   Pain  Current pain ratin  At best pain ratin  At worst pain ratin  Quality: tight    Social Support  Steps to enter house: yes  Stairs in house: yes   Lives in: multiple-level home  Lives with: spouse    Employment status: not working (retired)  Hand dominance: right  Exercise history: some knee exercises that the surgeon's office gave her     Patient Goals  Patient goals for therapy: decreased pain and independence with ADLs/IADLs  Patient goal: "I want to be able to walk with no pain at all " "I don't want to limp when I walk "         Objective     Active Range of Motion     Right Knee   Flexion: 100 degrees   Extension: -25 degrees     Strength/Myotome Testing     Right Knee   Flexion: 3-  Extension: 3-    Ambulation   Weight-Bearing Status   Ambulation assistive devices: cane with larger attachment on the bottom     Ambulation: Level Surfaces   Ambulation with assistive device: independent    General Comments:      Knee Comments  10 cm below tibial tuberosity: L: 34 0 cm, R: 34 5 cm     Right: Soft calf, but painful to palpation throughout calf with soft pressure  No pitting edema   Mild redness of knee; no drainage or signs of infection  Incision in good alignment  Flowsheet Rows      Most Recent Value   PT/OT G-Codes   Current Score  33   Projected Score  65             Precautions: h/o endometrial cancer, h/o L TKA, R TKA 12/21/20     Increased time spent on patient education with diagnosis, prognosis, goals of therapy, progression of therapy, and plan of care  All questions answered  Patient instructed to call clinic with questions or concerns       Manuals 1/18                                                                Neuro Re-Ed                                                                                                        Ther Ex             Pt Edu [place]                                                                                                       Ther Activity                                       Gait Training                                       Modalities

## 2021-01-18 NOTE — PROGRESS NOTES
PT Evaluation     Today's date: 2021  Patient name: Allison Vidal  : 1940  MRN: [id number]  Referring provider: Monique Anderson MD  Dx:   Encounter Diagnosis     ICD-10-CM    1  Chronic pain of right knee  M25 561     G89 29    2  Status post right knee replacement  Z96 651                   Assessment  Assessment details: Patient is a 69 y/o female s/p R TKA on 20  Patient presents with decreased functional mobility due to increased knee pain, decreased knee strength, decreased knee ROM, gait deviations requiring SPC associated with aforementioned surgical procedure  Due to self reports of 8/10 pain, calf pain when walking, and tenderness to touch throughout right calf, recommend patient go to ER for Doppler to r/o DVT  Called referring physician's office and spoke with nurse Chaka Pinto who will leave message for physician  Following negative Doppler, Patient will benefit from skilled physical therapy to address impairment and improve functional mobility  PT needed to allow for return to maximal function and improve quality of life  Impairments: abnormal or restricted ROM, activity intolerance, impaired physical strength, lacks appropriate home exercise program and pain with function  Understanding of Dx/Px/POC: good   Prognosis: good    Goals  STG within 4 weeks:   1  Patient to be independent in HEP  2  Reduce pain by 50% to improve quality of life  3  Improve knee flexion to 120 degrees to improve stairs and gait  4  Improve knee extension to 0 degrees to improve gait  LTG within 8 weeks:   1  Patient to be independent in ADLs/IADLs without difficulty  2  Improve knee strength to at least 4    3  Patient to be able to ambulate community distances with appropriate assistive device  4  Patient to be able to perform reciprocal stairs  Plan  Plan details: Recommend patient go to ER for doppler US to r/o DVT  Patient instructed to call clinic with questions or concerns   Encouraged to call physician's office with any questions or for further instruction  Patient "on hold" until cleared by physician or negative Doppler  Patient denies having any questions following visit and is appreciative of service  In agreement with going to ER  Patient would benefit from: skilled physical therapy and PT eval  Planned modality interventions: cryotherapy, hydrotherapy and unattended electrical stimulation  Planned therapy interventions: therapeutic training, therapeutic exercise, therapeutic activities, stretching, strengthening, postural training, patient education, neuromuscular re-education, manual therapy, joint mobilization, IADL retraining, activity modification, ADL retraining, ADL training, body mechanics training, flexibility, functional ROM exercises, gait training, graded activity, graded exercise, graded motor, home exercise program and balance/weight bearing training  Frequency: 2x week  Duration in weeks: 8  Plan of Care beginning date: 2021  Plan of Care expiration date: 3/15/2021  Treatment plan discussed with: patient        Subjective Evaluation    History of Present Illness  Mechanism of injury: Patient is a 67 y/o female s/p right TKA on 20  She states she started having pain 2020 and failed conservative treatment of injection and home exercises  She was ultimately scheduled for a right TKA on 20  She has not had any home health and has not been back to the doctor due to Covid  She opted to do her own home exercises provided by the surgeon and has followed up with surgeon via televisit  She requested PT and was provided with a script for PT   She arrives today with   Pain  Current pain ratin  At best pain ratin  At worst pain ratin  Quality: tight    Social Support  Steps to enter house: yes  Stairs in house: yes   Lives in: multiple-level home  Lives with: spouse    Employment status: not working (retired)  Hand dominance: right  Exercise history: some knee exercises that the surgeon's office gave her     Patient Goals  Patient goals for therapy: decreased pain and independence with ADLs/IADLs  Patient goal: "I want to be able to walk with no pain at all " "I don't want to limp when I walk "         Objective     Active Range of Motion     Right Knee   Flexion: 100 degrees   Extension: -25 degrees     Strength/Myotome Testing     Right Knee   Flexion: 3-  Extension: 3-    Ambulation   Weight-Bearing Status   Ambulation assistive devices: cane with larger attachment on the bottom     Ambulation: Level Surfaces   Ambulation with assistive device: independent    General Comments:      Knee Comments  10 cm below tibial tuberosity: L: 34 0 cm, R: 34 5 cm     Right: Soft calf, but painful to palpation throughout calf with soft pressure  No pitting edema  Mild redness of knee; no drainage or signs of infection  Incision in good alignment  Flowsheet Rows      Most Recent Value   PT/OT G-Codes   Current Score  33   Projected Score  65             Precautions: h/o endometrial cancer, h/o L TKA, R TKA 12/21/20     Increased time spent on patient education with diagnosis, prognosis, goals of therapy, progression of therapy, and plan of care  All questions answered  Patient instructed to call clinic with questions or concerns       Manuals 1/18                                                                Neuro Re-Ed                                                                                                        Ther Ex             Pt Edu KS                                                                                                       Ther Activity                                       Gait Training                                       Modalities

## 2021-01-19 ENCOUNTER — TELEPHONE (OUTPATIENT)
Dept: PHYSICAL THERAPY | Facility: CLINIC | Age: 81
End: 2021-01-19

## 2021-01-19 NOTE — TELEPHONE ENCOUNTER
Left message to see if patient was able to get Doppler US done and what the results were  Requested call back

## 2021-01-20 DIAGNOSIS — Z23 ENCOUNTER FOR IMMUNIZATION: ICD-10-CM

## 2021-01-22 ENCOUNTER — TRANSCRIBE ORDERS (OUTPATIENT)
Dept: PHYSICAL THERAPY | Facility: CLINIC | Age: 81
End: 2021-01-22

## 2021-01-22 DIAGNOSIS — M25.561 CHRONIC PAIN OF RIGHT KNEE: Primary | ICD-10-CM

## 2021-01-22 DIAGNOSIS — G89.29 CHRONIC PAIN OF RIGHT KNEE: Primary | ICD-10-CM

## 2021-01-27 ENCOUNTER — OFFICE VISIT (OUTPATIENT)
Dept: PHYSICAL THERAPY | Facility: CLINIC | Age: 81
End: 2021-01-27
Payer: COMMERCIAL

## 2021-01-27 DIAGNOSIS — M25.561 CHRONIC PAIN OF RIGHT KNEE: Primary | ICD-10-CM

## 2021-01-27 DIAGNOSIS — Z96.651 STATUS POST RIGHT KNEE REPLACEMENT: ICD-10-CM

## 2021-01-27 DIAGNOSIS — G89.29 CHRONIC PAIN OF RIGHT KNEE: Primary | ICD-10-CM

## 2021-01-27 PROCEDURE — 97140 MANUAL THERAPY 1/> REGIONS: CPT | Performed by: PHYSICAL THERAPIST

## 2021-01-27 PROCEDURE — 97110 THERAPEUTIC EXERCISES: CPT | Performed by: PHYSICAL THERAPIST

## 2021-01-27 PROCEDURE — 97112 NEUROMUSCULAR REEDUCATION: CPT | Performed by: PHYSICAL THERAPIST

## 2021-01-27 NOTE — PROGRESS NOTES
Daily Note     Today's date: 2021  Patient name: Anil Monroy  : 1940  MRN: [id number]  Referring provider: Cass Valenzuela MD  Dx:   Encounter Diagnosis     ICD-10-CM    1  Chronic pain of right knee  M25 561     G89 29    2  Status post right knee replacement  Z96 651                   Subjective: Patient states she went to the ED and does not have a DVT  She's still having posterior knee pain and difficulty walking  Objective: See treatment diary below      Assessment: Patient was seen in the ER on 21  Her Doppler was negative for DVT and xray showed prosthesis in good alignment without loosening  Tolerated treatment fair  Poor tolerance to knee extension exercises  She achieves 125 degrees of knee flexion, but lacks 14 degrees of knee extension  Therefore, encouraged to perform increased knee extension exercises within HEP  Written HEP provided to patient  Patient would benefit from continued PT      Plan: Continue per plan of care        Precautions: h/o endometrial cancer, h/o L TKA, R TKA 20         Manuals            STR Posterior R knee   10'                                                  Neuro Re-Ed             Quad set   3"x20            Towel crush w NMES (10" on, 10" off)  6 min                                                                            Ther Ex             Pt Edu KS KS           Recumbent bike rocking  5'           Hamstring stretch seated  10"x10            Standing gastroc stretch at wall  4 x15"                                                               Ther Activity                                       Gait Training                                       Modalities             Cold pack R knee  8'

## 2021-01-29 ENCOUNTER — OFFICE VISIT (OUTPATIENT)
Dept: PHYSICAL THERAPY | Facility: CLINIC | Age: 81
End: 2021-01-29
Payer: COMMERCIAL

## 2021-01-29 DIAGNOSIS — M25.561 CHRONIC PAIN OF RIGHT KNEE: Primary | ICD-10-CM

## 2021-01-29 DIAGNOSIS — G89.29 CHRONIC PAIN OF RIGHT KNEE: Primary | ICD-10-CM

## 2021-01-29 DIAGNOSIS — Z96.651 STATUS POST RIGHT KNEE REPLACEMENT: ICD-10-CM

## 2021-01-29 PROCEDURE — 97140 MANUAL THERAPY 1/> REGIONS: CPT | Performed by: PHYSICAL THERAPIST

## 2021-01-29 PROCEDURE — 97112 NEUROMUSCULAR REEDUCATION: CPT | Performed by: PHYSICAL THERAPIST

## 2021-01-29 PROCEDURE — 97110 THERAPEUTIC EXERCISES: CPT | Performed by: PHYSICAL THERAPIST

## 2021-01-29 NOTE — PROGRESS NOTES
Daily Note     Today's date: 2021  Patient name: Sam Burton  : 1940  MRN: [id number]  Referring provider: Mikhail Young MD  Dx:   Encounter Diagnosis     ICD-10-CM    1  Chronic pain of right knee  M25 561     G89 29    2  Status post right knee replacement  Z96 651                   Subjective: Patient states she feels a lot better after 1 therapy session  "I should have come here a month ago "       Objective: See treatment diary below      Assessment: Patient remains limited with terminal knee extension, but improved motion and tolerance from last session  Progressed HEP and provided updated written HEP to also include seated knee extension hang and LAQ  She also remains limited with quad engagement and will benefit from neuromuscular re-education and strengthening  would benefit from continued PT      Plan: Continue per plan of care        Precautions: h/o endometrial cancer, h/o L TKA, R TKA 20         Manuals           STR Posterior R knee   10' 10'                                                 Neuro Re-Ed             Quad set   3"x20  3"X20           Towel crush w NMES (10" on, 10" off)  6 min NV                                                                           Ther Ex             Pt Edu KS KS KS          Recumbent bike rocking  5' 8'          Hamstring stretch seated  10"x10  4x15"          Standing gastroc stretch at wall  4 x15" 4x15"           LAQ   2x10           Manual HS and gastroc stretch by PT    KS          Seated knee extension hang   1 min                       Ther Activity                                       Gait Training                                       Modalities             Cold pack R knee  8' decline

## 2021-02-01 ENCOUNTER — APPOINTMENT (OUTPATIENT)
Dept: PHYSICAL THERAPY | Facility: CLINIC | Age: 81
End: 2021-02-01
Payer: COMMERCIAL

## 2021-02-03 ENCOUNTER — APPOINTMENT (OUTPATIENT)
Dept: PHYSICAL THERAPY | Facility: CLINIC | Age: 81
End: 2021-02-03
Payer: COMMERCIAL

## 2021-02-05 ENCOUNTER — OFFICE VISIT (OUTPATIENT)
Dept: PHYSICAL THERAPY | Facility: CLINIC | Age: 81
End: 2021-02-05
Payer: COMMERCIAL

## 2021-02-05 DIAGNOSIS — Z96.651 STATUS POST RIGHT KNEE REPLACEMENT: ICD-10-CM

## 2021-02-05 DIAGNOSIS — G89.29 CHRONIC PAIN OF RIGHT KNEE: Primary | ICD-10-CM

## 2021-02-05 DIAGNOSIS — M25.561 CHRONIC PAIN OF RIGHT KNEE: Primary | ICD-10-CM

## 2021-02-05 PROCEDURE — 97140 MANUAL THERAPY 1/> REGIONS: CPT | Performed by: PHYSICAL THERAPIST

## 2021-02-05 PROCEDURE — 97110 THERAPEUTIC EXERCISES: CPT | Performed by: PHYSICAL THERAPIST

## 2021-02-05 NOTE — PROGRESS NOTES
Daily Note     Today's date: 2021  Patient name: Esteban Cabral  : 1940  MRN: [id number]  Referring provider: Kia Campos MD  Dx:   Encounter Diagnosis     ICD-10-CM    1  Chronic pain of right knee  M25 561     G89 29    2  Status post right knee replacement  Z96 651                   Subjective: Patient states her pain continues to decrease  Objective: See treatment diary below      Assessment: Improved knee extension ROM and improved tolerance to extension  Patient continues to be encouraged to perform knee extension stretching at home  Progressed HEP to include hamstring stretching in supine and prone knee hang  Patient  would benefit from continued PT      Plan: Continue per plan of care        Precautions: h/o endometrial cancer, h/o L TKA, R TKA 20         Manuals  2         STR Posterior R knee   10' 10' 5'         IASTM HS    3'         STR w roller HS    5'                      Neuro Re-Ed             Quad set   3"x20  3"X20           Towel crush w NMES (10" on, 10" off)  6 min NV          biodex LOS    lv 12 x5                                                             Ther Ex             Pt Edu KS KS KS          Recumbent bike rocking  5' 8' 8'         Hamstring stretch seated  10"x10  4x15" Supine w strap 5 x 20"         Standing gastroc stretch at wall  4 x15" 4x15"  By PT 5x20"          LAQ   2x10           Manual HS and gastroc stretch by PT    KS KS         Seated knee extension hang   1 min Prone 3'                      Ther Activity                                       Gait Training             Step up    4" x20                      Modalities             Cold pack R knee  8' decline 10'

## 2021-02-08 ENCOUNTER — OFFICE VISIT (OUTPATIENT)
Dept: PHYSICAL THERAPY | Facility: CLINIC | Age: 81
End: 2021-02-08
Payer: COMMERCIAL

## 2021-02-08 DIAGNOSIS — G89.29 CHRONIC PAIN OF RIGHT KNEE: Primary | ICD-10-CM

## 2021-02-08 DIAGNOSIS — Z96.651 STATUS POST RIGHT KNEE REPLACEMENT: ICD-10-CM

## 2021-02-08 DIAGNOSIS — M25.561 CHRONIC PAIN OF RIGHT KNEE: Primary | ICD-10-CM

## 2021-02-08 PROCEDURE — 97110 THERAPEUTIC EXERCISES: CPT | Performed by: PHYSICAL THERAPIST

## 2021-02-08 PROCEDURE — 97112 NEUROMUSCULAR REEDUCATION: CPT | Performed by: PHYSICAL THERAPIST

## 2021-02-08 PROCEDURE — 97140 MANUAL THERAPY 1/> REGIONS: CPT | Performed by: PHYSICAL THERAPIST

## 2021-02-08 NOTE — PROGRESS NOTES
Daily Note     Today's date: 2021  Patient name: Cleopatra Matthews  : 1940  MRN: [id number]  Referring provider: Augustine Acosta MD  Dx:   Encounter Diagnosis     ICD-10-CM    1  Chronic pain of right knee  M25 561     G89 29    2  Status post right knee replacement  Z96 651                   Subjective: Patient states her pain continues to decrease  She reports all ADLs have improved  "The exercises are painful when I'm doing them, but then I feel much better after "       Objective: See treatment diary below  Knee extension: -8 degrees with quad set      Assessment: Patient achieves -8 degrees terminal knee extension  Low tolerance to NMES and stopped after 3 min  Patient requires verbal cueing for quad engagement, in order to avoid co-contraction with hamstrings  Patient  would benefit from continued PT      Plan: Continue per plan of care        Precautions: h/o endometrial cancer, h/o L TKA, R TKA 20         Manuals  2/ 2/8        STR Posterior R knee   10' 10' 5' 8'        IASTM HS    3'         STR w roller HS    5'                      Neuro Re-Ed             Quad set   3"x20  3"X20   3"x20 w VC & MC        Towel crush w NMES (10" on, 10" off)  6 min NV  4 min        biodex LOS    lv 12 x5 Lv 12 x 5        Standing TB pressback     Red 3"x20        Prone quad set      3"x20                                   Ther Ex             Pt Edu KS KS KS          Recumbent bike rocking  5' 8' 8' Full rev 8'        Hamstring stretch seated  10"x10  4x15" Supine w strap 5 x 20" HEP review        Standing gastroc stretch at wall  4 x15" 4x15"  By PT 5x20"  HEP review        LAQ   2x10   2x10         Manual HS and gastroc stretch by PT    KS KS KS        Seated knee extension hang   1 min Prone 3' Prone 3'                      Ther Activity                                       Gait Training             Step up    4" x20 6"x20                      Modalities             Cold pack R knee  8' decline 10' decline

## 2021-02-10 ENCOUNTER — OFFICE VISIT (OUTPATIENT)
Dept: PHYSICAL THERAPY | Facility: CLINIC | Age: 81
End: 2021-02-10
Payer: COMMERCIAL

## 2021-02-10 DIAGNOSIS — Z96.651 STATUS POST RIGHT KNEE REPLACEMENT: ICD-10-CM

## 2021-02-10 DIAGNOSIS — G89.29 CHRONIC PAIN OF RIGHT KNEE: Primary | ICD-10-CM

## 2021-02-10 DIAGNOSIS — M25.561 CHRONIC PAIN OF RIGHT KNEE: Primary | ICD-10-CM

## 2021-02-10 PROCEDURE — 97140 MANUAL THERAPY 1/> REGIONS: CPT | Performed by: PHYSICAL THERAPIST

## 2021-02-10 PROCEDURE — 97112 NEUROMUSCULAR REEDUCATION: CPT | Performed by: PHYSICAL THERAPIST

## 2021-02-10 PROCEDURE — 97110 THERAPEUTIC EXERCISES: CPT | Performed by: PHYSICAL THERAPIST

## 2021-02-10 NOTE — PROGRESS NOTES
Daily Note     Today's date: 2/10/2021  Patient name: Marta Fall  : 1940  MRN: [id number]  Referring provider: Elke Miller MD  Dx:   Encounter Diagnosis     ICD-10-CM    1  Chronic pain of right knee  M25 561     G89 29    2  Status post right knee replacement  Z96 651                   Subjective: Patient states all ADLs have improved, especially walking, but slowly  Today, chief complaint of posterior knee and medial knee pain  Objective: See treatment diary below  Knee extension: -8 degrees with quad set      Assessment: Able to add mini squats and SAQ this visit  Overall, low tolerance to exercise progression  Patient to follow up with physician today and should discuss any current concerns at that time  Patient  would benefit from continued PT      Plan: Continue per plan of care        Precautions: h/o endometrial cancer, h/o L TKA, R TKA 20         Manuals 1/18 1/27 1/29 2/5 2/8 2/10        STR Posterior R knee   10' 10' 5' 8' IASTM 7'       IASTM HS    3'         STR w roller HS    5'         STR medial knee       3'       Neuro Re-Ed             Quad set   3"x20  3"X20   3"x20 w VC & MC        Towel crush w NMES (10" on, 10" off)  6 min NV  4 min 2x10        biodex LOS    lv 12 x5 Lv 12 x 5 Lv 12 x 5       Standing TB pressback     Red 3"x20 Red 3"x20        Prone quad set      3"x20  NV       SAQ      2x10                     Ther Ex             Pt Edu KS KS KS          Recumbent bike rocking  5' 8' 8' Full rev 8' Full rev 10'        Hamstring stretch seated  10"x10  4x15" Supine w strap 5 x 20" HEP review        Standing gastroc stretch at wall  4 x15" 4x15"  By PT 5x20"  HEP review        LAQ   2x10   2x10  2x10        Manual HS and gastroc stretch by PT    KS KS KS        Seated knee extension hang   1 min Prone 3' Prone 3'  Prone 3'        Mini squats      2x10        Heel raises       2x10       Ther Activity                                       Gait Training             Step up    4" x20 6"x20  6"x20                     Modalities             Cold pack R knee  8' decline 10' decline decline

## 2021-02-15 ENCOUNTER — OFFICE VISIT (OUTPATIENT)
Dept: PHYSICAL THERAPY | Facility: CLINIC | Age: 81
End: 2021-02-15
Payer: COMMERCIAL

## 2021-02-15 DIAGNOSIS — G89.29 CHRONIC PAIN OF RIGHT KNEE: Primary | ICD-10-CM

## 2021-02-15 DIAGNOSIS — Z96.651 STATUS POST RIGHT KNEE REPLACEMENT: ICD-10-CM

## 2021-02-15 DIAGNOSIS — M25.561 CHRONIC PAIN OF RIGHT KNEE: Primary | ICD-10-CM

## 2021-02-15 PROCEDURE — 97140 MANUAL THERAPY 1/> REGIONS: CPT | Performed by: PHYSICAL THERAPIST

## 2021-02-15 PROCEDURE — 97112 NEUROMUSCULAR REEDUCATION: CPT | Performed by: PHYSICAL THERAPIST

## 2021-02-15 PROCEDURE — 97110 THERAPEUTIC EXERCISES: CPT | Performed by: PHYSICAL THERAPIST

## 2021-02-15 NOTE — PROGRESS NOTES
PT Re-Evaluation     Today's date: 2/15/2021  Patient name: Acosta Arambula  : 1940  MRN: [id number]  Referring provider: Vera Lawrence MD  Dx:   Encounter Diagnosis     ICD-10-CM    1  Chronic pain of right knee  M25 561     G89 29    2  Status post right knee replacement  Z96 651                   Assessment  Assessment details: Patient is a 67 y/o female s/p R TKA on 20  Patient presents with decreased functional mobility due to increased knee pain, decreased knee strength, decreased knee ROM, gait deviations requiring SPC associated with aforementioned surgical procedure  Since starting therapy, patient presents with decreased self reported pain, significantly improved knee flexion and extension ROM, improved ability to engage quad, and improved gait  She was seen by physician's office last week  She continues to require skilled PT to address remaining impairments and allow for maximal function  She is motivated for therapy  Impairments: abnormal or restricted ROM, activity intolerance, impaired physical strength, lacks appropriate home exercise program and pain with function  Understanding of Dx/Px/POC: good   Prognosis: good    Goals  STG within 4 weeks:   1  Patient to be independent in HEP  met  2  Reduce pain by 50% to improve quality of life  Not formally reassessed   3  Improve knee flexion to 120 degrees to improve stairs and gait  MET  4  Improve knee extension to 0 degrees to improve gait  Partially met  LTG within 8 weeks:   1  Patient to be independent in ADLs/IADLs without difficulty  2  Improve knee strength to at least 4    3  Patient to be able to ambulate community distances with appropriate assistive device  4  Patient to be able to perform reciprocal stairs       Plan  Patient would benefit from: skilled physical therapy and PT eval  Planned modality interventions: cryotherapy, hydrotherapy and unattended electrical stimulation  Planned therapy interventions: therapeutic training, therapeutic exercise, therapeutic activities, stretching, strengthening, postural training, patient education, neuromuscular re-education, manual therapy, joint mobilization, IADL retraining, activity modification, ADL retraining, ADL training, body mechanics training, flexibility, functional ROM exercises, gait training, graded activity, graded exercise, graded motor, home exercise program and balance/weight bearing training  Frequency: 2x week  Duration in weeks: 8  Plan of Care beginning date: 2021  Plan of Care expiration date: 3/15/2021  Treatment plan discussed with: patient        Subjective Evaluation    History of Present Illness  Mechanism of injury: Patient is a 67 y/o female s/p right TKA on 20  She states she started having pain 2020 and failed conservative treatment of injection and home exercises  She was ultimately scheduled for a right TKA on 20  She has not had any home health and has not been back to the doctor due to Covid  She opted to do her own home exercises provided by the surgeon and has followed up with surgeon via televisit  She requested PT and was provided with a script for PT      2/15/21: Patient arrives today with cane, stating she sometimes doesn't use it  She reports, "I know I'm getting better "  She reports seeing physician's office last week and being seen by PA  She reports pain has improved and walking has improved     Pain  Current pain ratin  At best pain ratin  At worst pain ratin  Quality: tight    Social Support  Steps to enter house: yes  Stairs in house: yes   Lives in: multiple-level home  Lives with: spouse    Employment status: not working (retired)  Hand dominance: right  Exercise history: some knee exercises that the surgeon's office gave her     Patient Goals  Patient goals for therapy: decreased pain and independence with ADLs/IADLs  Patient goal: "I want to be able to walk with no pain at all " "I don't want to limp when I walk "         Objective     Active Range of Motion     Right Knee   Flexion: 125 degrees   Extension: -3 degrees     Strength/Myotome Testing     Left Knee   Quadriceps contraction: fair    Right Knee   Flexion: 3  Extension: 3  Quadriceps contraction: poor    Ambulation   Weight-Bearing Status   Ambulation assistive devices: cane with larger attachment on the bottom     Ambulation: Level Surfaces   Ambulation with assistive device: independent    General Comments:      Knee Comments  10 cm below tibial tuberosity: L: 34 0 cm, R: 34 5 cm     Right: Soft calf, but painful to palpation throughout calf with soft pressure  No pitting edema  Mild redness of knee; no drainage or signs of infection  Incision in good alignment                Precautions: h/o endometrial cancer, h/o L TKA, R TKA 12/21/20         Manuals 1/18 1/27 1/29 2/5 2/8 2/10  2/15      STR Posterior R knee   10' 10' 5' 8' IASTM 7' IASTM 8'      IASTM HS    3'         STR w roller HS    5'         STR medial knee       3'       Re-eval       KS      Pain assessment       NV      Neuro Re-Ed             Quad set   3"x20  3"X20   3"x20 w VC & MC  3"x20 w VC & MC      Towel crush w NMES (10" on, 10" off)  6 min NV  4 min 2x10        biodex LOS    lv 12 x5 Lv 12 x 5 Lv 12 x 5 Lv 12 x 5      Standing TB pressback     Red 3"x20 Red 3"x20        Prone quad set      3"x20  NV 3"X20       SAQ      2x10                     Ther Ex             Pt Edu KS KS KS          Recumbent bike rocking  5' 8' 8' Full rev 8' Full rev 10'  Full 10'      Hamstring stretch seated  10"x10  4x15" Supine w strap 5 x 20" HEP review        Standing gastroc stretch at wall  4 x15" 4x15"  By PT 5x20"  HEP review  5x20"       LAQ   2x10   2x10  2x10  2x10       Manual HS and gastroc stretch by PT    KS KS KS        Seated knee extension hang   1 min Prone 3' Prone 3'  Prone 3'  Prone 5'      Mini squats      2x10        Heel raises       2x10       Ther Activity Gait Training             Step up    4" x20 6"x20  6"x20  biodex front/side x 10 ea                   Modalities             Cold pack R knee  8' decline 10' decline decline 8'

## 2021-02-17 ENCOUNTER — OFFICE VISIT (OUTPATIENT)
Dept: PHYSICAL THERAPY | Facility: CLINIC | Age: 81
End: 2021-02-17
Payer: COMMERCIAL

## 2021-02-17 DIAGNOSIS — G89.29 CHRONIC PAIN OF RIGHT KNEE: Primary | ICD-10-CM

## 2021-02-17 DIAGNOSIS — Z96.651 STATUS POST RIGHT KNEE REPLACEMENT: ICD-10-CM

## 2021-02-17 DIAGNOSIS — M25.561 CHRONIC PAIN OF RIGHT KNEE: Primary | ICD-10-CM

## 2021-02-17 PROCEDURE — 97112 NEUROMUSCULAR REEDUCATION: CPT | Performed by: PHYSICAL THERAPIST

## 2021-02-17 PROCEDURE — 97140 MANUAL THERAPY 1/> REGIONS: CPT | Performed by: PHYSICAL THERAPIST

## 2021-02-17 PROCEDURE — 97110 THERAPEUTIC EXERCISES: CPT | Performed by: PHYSICAL THERAPIST

## 2021-02-17 NOTE — PROGRESS NOTES
Daily Note     Today's date: 2021  Patient name: Jeanie Elizondo  : 1940  MRN: [id number]  Referring provider: Valencia Barry MD  Dx:   Encounter Diagnosis     ICD-10-CM    1  Chronic pain of right knee  M25 561     G89 29    2  Status post right knee replacement  Z96 651                   Subjective: Patient states, "I'm doing great "       Objective: See treatment diary below      Assessment: She arrives ten minutes late but is accommodated for session  Tolerated treatment well  Continued improved knee extension ROM, along with improved quad engagement noted  She cannot tolerate prone lying for long without leg propped, indicating lack of terminal knee extension  Encouraged to continue stretching several times a day to reduce tightness in this region  Patient would benefit from continued PT      Plan: Continue per plan of care        Precautions: h/o endometrial cancer, h/o L TKA, R TKA 20         Manuals  2/5 2/8 2/10  2/15 2/17     STR Posterior R knee   10' 10' 5' 8' IASTM 7' IASTM 8' IASTM 8'     IASTM HS    3'         STR w roller HS    5'         STR medial knee       3'       Re-eval       KS      Pain assessment       NV NV     Neuro Re-Ed             Quad set   3"x20  3"X20   3"x20 w VC & MC  3"x20 w VC & MC 3"x20 w VC & MC      Towel crush w NMES (10" on, 10" off)  6 min NV  4 min 2x10        biodex LOS    lv 12 x5 Lv 12 x 5 Lv 12 x 5 Lv 12 x 5 Lv 11 x 5     Standing TB pressback     Red 3"x20 Red 3"x20   Red 3"x 20      Prone quad set      3"x20  NV 3"X20  3"x20      SAQ      2x10                     Ther Ex             Pt Edu KS KS KS          Recumbent bike rocking  5' 8' 8' Full rev 8' Full rev 10'  Full 10' Full 10'     Hamstring stretch seated  10"x10  4x15" Supine w strap 5 x 20" HEP review        Standing gastroc stretch at wall  4 x15" 4x15"  By PT 5x20"  HEP review  5x20"       LAQ   2x10   2x10  2x10  2x10       Manual HS and gastroc stretch by PT    KS KS KS Seated knee extension hang   1 min Prone 3' Prone 3'  Prone 3'  Prone 5' Prone 3'      Mini squats      2x10        Heel raises       2x10  2x10      Ther Activity                                       Gait Training             Step up    4" x20 6"x20  6"x20  biodex front/side x 10 ea 6" front/side x20 ea                  Modalities             Cold pack R knee  8' decline 10' decline decline 8' Decline

## 2021-02-22 ENCOUNTER — OFFICE VISIT (OUTPATIENT)
Dept: PHYSICAL THERAPY | Facility: CLINIC | Age: 81
End: 2021-02-22
Payer: COMMERCIAL

## 2021-02-22 DIAGNOSIS — M25.561 CHRONIC PAIN OF RIGHT KNEE: Primary | ICD-10-CM

## 2021-02-22 DIAGNOSIS — G89.29 CHRONIC PAIN OF RIGHT KNEE: Primary | ICD-10-CM

## 2021-02-22 DIAGNOSIS — Z96.651 STATUS POST RIGHT KNEE REPLACEMENT: ICD-10-CM

## 2021-02-22 PROCEDURE — 97140 MANUAL THERAPY 1/> REGIONS: CPT | Performed by: PHYSICAL THERAPIST

## 2021-02-22 PROCEDURE — 97110 THERAPEUTIC EXERCISES: CPT | Performed by: PHYSICAL THERAPIST

## 2021-02-22 NOTE — PROGRESS NOTES
Daily Note     Today's date: 2021  Patient name: Solange Durham  : 1940  MRN: [id number]  Referring provider: Myron Rockwell MD  Dx:   Encounter Diagnosis     ICD-10-CM    1  Chronic pain of right knee  M25 561     G89 29    2  Status post right knee replacement  Z96 651                   Subjective: Patient states her son was helping her with exercises and she had increased pain in her knee this weekend  She states she has improved mobility following her exercises  She is frustrated that this is "taking so long "       Objective: See treatment diary below    Pain: at worst: 5/10   At best: 0/10   Current: 2/10       Assessment: Patient tolerates treatment fair  She is educated in typical treatment, progression of therapy, and timeframe  Patient exhibits decreased tolerance to extension ROM this visit  Educated in importance of gaining full extension ROM  Recommend increased strengthening in order to improve functional mobility  Patient would benefit from continued PT      Plan: Continue per plan of care  Precautions: h/o endometrial cancer, h/o L TKA, R TKA 20         Manuals 1/18 1/27 1/29 2/5 2/8 2/10  2/15 2/17 2/22    STR Posterior R knee   10' 10' 5' 8' IASTM 7' IASTM 8' IASTM 8' IASTM 5'     IASTM HS    3'         STR w roller HS    5'         STR medial knee       3'   IASTM 5'     Re-eval       KS      Pain assessment       NV NV See Obj       Neuro Re-Ed             Quad set   3"x20  3"X20   3"x20 w VC & MC  3"x20 w VC & MC 3"x20 w VC & MC  3"x20 w VC     Towel crush w NMES (10" on, 10" off)  6 min NV  4 min 2x10        biodex LOS    lv 12 x5 Lv 12 x 5 Lv 12 x 5 Lv 12 x 5 Lv 11 x 5 Lv 11 x 5    Standing TB pressback     Red 3"x20 Red 3"x20   Red 3"x 20      Prone quad set      3"x20  NV 3"X20  3"x20      SAQ      2x10                     Ther Ex             Pt Edu KS KS KS          Recumbent bike rocking  5' 8' 8' Full rev 8' Full rev 10'  Full 10' Full 10' Full 10'    Hamstring stretch seated  10"x10  4x15" Supine w strap 5 x 20" HEP review        Standing gastroc stretch at wall  4 x15" 4x15"  By PT 5x20"  HEP review  5x20"       LAQ   2x10   2x10  2x10  2x10   2x10     Manual HS and gastroc stretch by PT    KS KS KS    KS    Seated knee extension hang   1 min Prone 3' Prone 3'  Prone 3'  Prone 5' Prone 3'      Mini squats      2x10        Heel raises       2x10  2x10  2x10     Standing hip abd/ext         Yellow 2x10     Ther Activity                                       Gait Training             Step up    4" x20 6"x20  6"x20  biodex front/side x 10 ea 6" front/side x20 ea                  Modalities             Cold pack R knee  8' decline 10' decline decline 8' Decline  Decline

## 2021-02-24 ENCOUNTER — APPOINTMENT (OUTPATIENT)
Dept: PHYSICAL THERAPY | Facility: CLINIC | Age: 81
End: 2021-02-24
Payer: COMMERCIAL

## 2021-02-25 ENCOUNTER — OFFICE VISIT (OUTPATIENT)
Dept: PHYSICAL THERAPY | Facility: CLINIC | Age: 81
End: 2021-02-25
Payer: COMMERCIAL

## 2021-02-25 DIAGNOSIS — G89.29 CHRONIC PAIN OF RIGHT KNEE: Primary | ICD-10-CM

## 2021-02-25 DIAGNOSIS — M25.561 CHRONIC PAIN OF RIGHT KNEE: Primary | ICD-10-CM

## 2021-02-25 DIAGNOSIS — Z96.651 STATUS POST RIGHT KNEE REPLACEMENT: ICD-10-CM

## 2021-02-25 PROCEDURE — 97110 THERAPEUTIC EXERCISES: CPT | Performed by: PHYSICAL THERAPIST

## 2021-02-25 PROCEDURE — 97112 NEUROMUSCULAR REEDUCATION: CPT | Performed by: PHYSICAL THERAPIST

## 2021-02-25 NOTE — PROGRESS NOTES
Daily Note     Today's date: 2021  Patient name: Anu Collado  : 1940  MRN: [id number]  Referring provider: Karel Redmond MD  Dx:   Encounter Diagnosis     ICD-10-CM    1  Chronic pain of right knee  M25 561     G89 29    2  Status post right knee replacement  Z96 651                   Subjective: Patient states she is doing better  "I can walk without the cane " She states she's feeling better and feeling more positive  Objective: See treatment diary below    Knee extension: -3 degrees       Assessment: Patient tolerates treatment well  Increased time spent on functional exercises  She is fatigued appropriately post session, but will benefit from increased strengthening and endurance  Patient would benefit from continued PT      Plan: Continue per plan of care  Precautions: h/o endometrial cancer, h/o L TKA, R TKA 20         Manuals 1/18 1/27 1/29 2/5 2/8 2/10  2/15 2/17 2/22 2/25   STR Posterior R knee   10' 10' 5' 8' IASTM 7' IASTM 8' IASTM 8' IASTM 5'     IASTM HS    3'         STR w roller HS    5'         STR medial knee       3'   IASTM 5'     Re-eval       KS      Pain assessment       NV NV See Obj       Neuro Re-Ed             Quad set   3"x20  3"X20   3"x20 w VC & MC  3"x20 w VC & MC 3"x20 w VC & MC  3"x20 w VC  3"x20 w VC/MC   Towel crush w NMES (10" on, 10" off)  6 min NV  4 min 2x10        biodex LOS    lv 12 x5 Lv 12 x 5 Lv 12 x 5 Lv 12 x 5 Lv 11 x 5 Lv 11 x 5 Floor/foam Lv 11-10 x 3 ea   Standing TB pressback     Red 3"x20 Red 3"x20   Red 3"x 20   Red 3"x20    Prone quad set      3"x20  NV 3"X20  3"x20      SAQ      2x10                     Ther Ex             Pt Edu KS KS KS          Recumbent bike rocking  5' 8' 8' Full rev 8' Full rev 10'  Full 10' Full 10' Full 10' Full 10'   Hamstring stretch seated  10"x10  4x15" Supine w strap 5 x 20" HEP review        Standing gastroc stretch at wall  4 x15" 4x15"  By PT 5x20"  HEP review  5x20"       LAQ   2x10   2x10  2x10  2x10 2x10  2x10    Manual HS and gastroc stretch by PT    KS KS KS    KS    Seated knee extension hang   1 min Prone 3' Prone 3'  Prone 3'  Prone 5' Prone 3'      Mini squats      2x10     2x10    Heel raises       2x10  2x10  2x10  2x10    Standing hip abd/ext         Yellow 2x10  Yellow 2x10 ea   Ther Activity                                       Gait Training             Step up    4" x20 6"x20  6"x20  biodex front/side x 10 ea 6" front/side x20 ea  6" front/side x 20 ea   Step overs          6" x 10    Modalities             Cold pack R knee  8' decline 10' decline decline 8' Decline  Decline

## 2021-03-02 ENCOUNTER — OFFICE VISIT (OUTPATIENT)
Dept: PHYSICAL THERAPY | Facility: CLINIC | Age: 81
End: 2021-03-02
Payer: COMMERCIAL

## 2021-03-02 DIAGNOSIS — Z96.651 STATUS POST RIGHT KNEE REPLACEMENT: ICD-10-CM

## 2021-03-02 DIAGNOSIS — M25.561 CHRONIC PAIN OF RIGHT KNEE: Primary | ICD-10-CM

## 2021-03-02 DIAGNOSIS — G89.29 CHRONIC PAIN OF RIGHT KNEE: Primary | ICD-10-CM

## 2021-03-02 PROCEDURE — 97110 THERAPEUTIC EXERCISES: CPT | Performed by: PHYSICAL THERAPIST

## 2021-03-02 PROCEDURE — 97112 NEUROMUSCULAR REEDUCATION: CPT | Performed by: PHYSICAL THERAPIST

## 2021-03-02 NOTE — PROGRESS NOTES
Daily Note     Today's date: 3/2/2021  Patient name: Raj Vicente  : 1940  MRN: [id number]  Referring provider: Sherman Harris MD  Dx:   Encounter Diagnosis     ICD-10-CM    1  Chronic pain of right knee  M25 561     G89 29    2  Status post right knee replacement  Z96 651                   Subjective: Patient reports she is doing well  States she was able to walk her dog and also able to clean the refrigerator, both of which she was previously unable to do  Objective: See treatment diary below    Knee extension: -3 degrees; -1 degrees after manual stretch       Assessment: Patient tolerates exercise well; she tolerates manual extension stretching fair  Improved knee extension ROM following overpressure  Continues to be encouraged to work on extension in order to improve gait and overall pain level  Patient would benefit from continued PT      Plan: Continue per plan of care  Precautions: h/o endometrial cancer, h/o L TKA, R TKA 20         Manuals 3/2   2 2/8 2/10  2/15 2/17 2/22 2/25   STR Posterior R knee     5' 8' IASTM 7' IASTM 8' IASTM 8' IASTM 5'     IASTM HS    3'         STR w roller HS    5'         STR medial knee       3'   IASTM 5'     Re-eval       KS      Pain assessment       NV NV See Obj       Neuro Re-Ed             Quad set  3"X20     3"x20 w VC & MC  3"x20 w VC & MC 3"x20 w VC & MC  3"x20 w VC  3"x20 w VC/MC   Towel crush w NMES (10" on, 10" off)     4 min 2x10        biodex LOS Floor/foamLv 10 x3 ea   lv 12 x5 Lv 12 x 5 Lv 12 x 5 Lv 12 x 5 Lv 11 x 5 Lv 11 x 5 Floor/foam Lv 11-10 x 3 ea   Standing TB pressback     Red 3"x20 Red 3"x20   Red 3"x 20   Red 3"x20    Prone quad set      3"x20  NV 3"X20  3"x20      SAQ      2x10                     Ther Ex             Pt Edu             Recumbent bike  10'   8' Full rev 8' Full rev 10'  Full 10' Full 10' Full 10' Full 10'   Hamstring stretch seated    Supine w strap 5 x 20" HEP review        Standing gastroc stretch at wall By PT 5x20"  HEP review  5x20"       LAQ     2x10  2x10  2x10   2x10  2x10    Manual HS and gastroc stretch by PT     KS KS    KS    Seated knee extension hang Prone 2 min   Prone 3' Prone 3'  Prone 3'  Prone 5' Prone 3'      Mini squats 2x10     2x10     2x10    Heel raises  2x10      2x10  2x10  2x10  2x10    Standing hip abd/ext Red 2x10 ea        Yellow 2x10  Yellow 2x10 ea   Side step w band Red x 6            Knee extension overpressure  KS 10"x5            Ther Activity                                       Gait Training                          Tandem gait at rail x6            Step up    4" x20 6"x20  6"x20  biodex front/side x 10 ea 6" front/side x20 ea  6" front/side x 20 ea   Step overs          6" x 10    Modalities             Cold pack R knee 8 min   10' decline decline 8' Decline  Decline

## 2021-03-05 ENCOUNTER — OFFICE VISIT (OUTPATIENT)
Dept: PHYSICAL THERAPY | Facility: CLINIC | Age: 81
End: 2021-03-05
Payer: COMMERCIAL

## 2021-03-05 DIAGNOSIS — Z96.651 STATUS POST RIGHT KNEE REPLACEMENT: Primary | ICD-10-CM

## 2021-03-05 DIAGNOSIS — M25.561 CHRONIC PAIN OF RIGHT KNEE: ICD-10-CM

## 2021-03-05 DIAGNOSIS — G89.29 CHRONIC PAIN OF RIGHT KNEE: ICD-10-CM

## 2021-03-05 PROCEDURE — 97110 THERAPEUTIC EXERCISES: CPT | Performed by: PHYSICAL THERAPIST

## 2021-03-05 PROCEDURE — 97140 MANUAL THERAPY 1/> REGIONS: CPT | Performed by: PHYSICAL THERAPIST

## 2021-03-05 PROCEDURE — 97112 NEUROMUSCULAR REEDUCATION: CPT | Performed by: PHYSICAL THERAPIST

## 2021-03-05 NOTE — PROGRESS NOTES
Daily Note     Today's date: 3/5/2021  Patient name: Dinah Lopez  : 1940  MRN: [id number]  Referring provider: Jenny Moses MD  Dx:   Encounter Diagnosis     ICD-10-CM    1  Status post right knee replacement  Z96 651    2  Chronic pain of right knee  M25 561     G89 29                   Subjective: Patient states she is doing well with ADLs  She reports most difficulty with terminal knee extension  Objective: See treatment diary below      Assessment: Patient has poor tolerance to knee extension with overpressure  She has poor tolerance in general to knee extension stretch, including self stretches  This is patient's most limiting impairment  She has been educated in self stretches and HEP to promote extension  She is able to perform reciprocal ascending and descending of stairs and is able to walk around clinic without cane  Patient would benefit from continued PT      Plan: Continue per plan of care  Precautions: h/o endometrial cancer, h/o L TKA, R TKA 20         Manuals 3/2 3  2/5 2/8 2/10  2/15 2/17 2/22 2/25   STR Posterior R knee   5'  5' 8' IASTM 7' IASTM 8' IASTM 8' IASTM 5'     IASTM HS  5'  3'         STR w roller HS    5'         STR medial knee       3'   IASTM 5'     Re-eval       KS      Pain assessment       NV NV See Obj       Neuro Re-Ed             Quad set  3"X20  3"x20    3"x20 w VC & MC  3"x20 w VC & MC 3"x20 w VC & MC  3"x20 w VC  3"x20 w VC/MC   Towel crush w NMES (10" on, 10" off)     4 min 2x10        biodex LOS Floor/foamLv 10 x3 ea Floor/foamLv 10 x3 ea  lv 12 x5 Lv 12 x 5 Lv 12 x 5 Lv 12 x 5 Lv 11 x 5 Lv 11 x 5 Floor/foam Lv 11-10 x 3 ea   Standing TB pressback  Red 2x10    Red 3"x20 Red 3"x20   Red 3"x 20   Red 3"x20    Prone quad set   3"x20    3"x20  NV 3"X20  3"x20      SAQ      2x10                     Ther Ex             Pt Edu  KS           Recumbent bike  10' 10'  8' Full rev 8' Full rev 10'  Full 10' Full 10' Full 10' Full 10'   Hamstring stretch seated    Supine w strap 5 x 20" HEP review        Standing gastroc stretch at wall    By PT 5x20"  HEP review  5x20"       LAQ     2x10  2x10  2x10   2x10  2x10    Manual HS and gastroc stretch by PT     KS KS    KS    Seated knee extension hang Prone 2 min Prone 3 min  Prone 3' Prone 3'  Prone 3'  Prone 5' Prone 3'      Mini squats 2x10 TG NV     2x10     2x10    Heel raises  2x10      2x10  2x10  2x10  2x10    Standing hip abd/ext Red 2x10 ea        Yellow 2x10  Yellow 2x10 ea   Side step w band Red x 6            Knee extension overpressure  KS 10"x5 KS 10" x5           Ther Activity                                       Gait Training                          Tandem gait at rail x6 x6           Step up  6" front x10   4" x20 6"x20  6"x20  biodex front/side x 10 ea 6" front/side x20 ea  6" front/side x 20 ea   Step overs  6" x10         6" x 10    Up/down stairs  x2           Modalities             Cold pack R knee 8 min decline  10' decline decline 8' Decline  Decline

## 2021-03-09 ENCOUNTER — OFFICE VISIT (OUTPATIENT)
Dept: PHYSICAL THERAPY | Facility: CLINIC | Age: 81
End: 2021-03-09
Payer: COMMERCIAL

## 2021-03-09 DIAGNOSIS — M25.561 CHRONIC PAIN OF RIGHT KNEE: ICD-10-CM

## 2021-03-09 DIAGNOSIS — Z96.651 STATUS POST RIGHT KNEE REPLACEMENT: Primary | ICD-10-CM

## 2021-03-09 DIAGNOSIS — G89.29 CHRONIC PAIN OF RIGHT KNEE: ICD-10-CM

## 2021-03-09 PROCEDURE — 97110 THERAPEUTIC EXERCISES: CPT | Performed by: PHYSICAL THERAPIST

## 2021-03-09 PROCEDURE — 97112 NEUROMUSCULAR REEDUCATION: CPT | Performed by: PHYSICAL THERAPIST

## 2021-03-09 NOTE — PROGRESS NOTES
Daily Note     Today's date: 3/9/2021  Patient name: Jessica Mayberry  : 1940  MRN: [id number]  Referring provider: Bang Geller MD  Dx:   Encounter Diagnosis     ICD-10-CM    1  Status post right knee replacement  Z96 651    2  Chronic pain of right knee  M25 561     G89 29                   Subjective: Patient states she's walking better and did not bring her cane today  Objective: See treatment diary below    Knee extension: active - 3 degrees       Assessment: Patient arrives to session 22 minutes late, but she is accommodated for treatment  She achieves -3 degrees of knee extension ROM actively  Treatment focuses on functional exercises and knee extension ROM  Patient would benefit from continued PT      Plan: Continue per plan of care  Formal re-evaluation to be completed next visit  Precautions: h/o endometrial cancer, h/o L TKA, R TKA 20         Manuals 3/2 3/5 3/9     2/17 2/22 2/25   STR Posterior R knee   5'      IASTM 8' IASTM 5'     IASTM HS  5'           STR w roller HS             STR medial knee          IASTM 5'     Re-eval             Pain assessment        NV See Obj       Neuro Re-Ed             Quad set  3"X20  3"x20  3"x20      3"x20 w VC & MC  3"x20 w VC  3"x20 w VC/MC   Towel crush w NMES (10" on, 10" off)             biodex LOS Floor/foamLv 10 x3 ea Floor/foamLv 10 x3 ea Floor/foam Lv 9 x 4 ea     Lv 11 x 5 Lv 11 x 5 Floor/foam Lv 11-10 x 3 ea   Standing TB pressback  Red 2x10  Red x15; into SLB red x 15     Red 3"x 20   Red 3"x20    Prone quad set   3"x20       3"x20      SAQ                          Ther Ex             Pt Edu  KS KS          Recumbent bike  10' 10' 10'     Full 10' Full 10' Full 10'   Hamstring stretch seated             Standing gastroc stretch at wall             LAQ         2x10  2x10    Manual HS and gastroc stretch by PT    NV      KS    Seated knee extension hang Prone 2 min Prone 3 min      Prone 3'      Mini squats 2x10  2x10        2x10 Heel raises  2x10        2x10  2x10  2x10    Standing hip abd/ext Red 2x10 ea        Yellow 2x10  Yellow 2x10 ea   Side step w band Red x 6            Knee extension overpressure  KS 10"x5 KS 10" x5 KS          Ther Activity                                       Gait Training                          Tandem gait at rail x6 x6 x6          Step up  6" front x10       6" front/side x20 ea  6" front/side x 20 ea   Step overs  6" x10         6" x 10    Up/down stairs  x2           Modalities             Cold pack R knee 8 min decline      Decline  Decline

## 2021-03-12 ENCOUNTER — EVALUATION (OUTPATIENT)
Dept: PHYSICAL THERAPY | Facility: CLINIC | Age: 81
End: 2021-03-12
Payer: COMMERCIAL

## 2021-03-12 ENCOUNTER — TRANSCRIBE ORDERS (OUTPATIENT)
Dept: PHYSICAL THERAPY | Facility: CLINIC | Age: 81
End: 2021-03-12

## 2021-03-12 DIAGNOSIS — Z96.651 STATUS POST RIGHT KNEE REPLACEMENT: Primary | ICD-10-CM

## 2021-03-12 DIAGNOSIS — M25.561 CHRONIC PAIN OF RIGHT KNEE: ICD-10-CM

## 2021-03-12 DIAGNOSIS — G89.29 CHRONIC PAIN OF RIGHT KNEE: ICD-10-CM

## 2021-03-12 PROCEDURE — 97110 THERAPEUTIC EXERCISES: CPT | Performed by: PHYSICAL THERAPIST

## 2021-03-12 PROCEDURE — 97112 NEUROMUSCULAR REEDUCATION: CPT | Performed by: PHYSICAL THERAPIST

## 2021-03-12 PROCEDURE — 97116 GAIT TRAINING THERAPY: CPT | Performed by: PHYSICAL THERAPIST

## 2021-03-12 NOTE — PROGRESS NOTES
PT Re-Evaluation     Today's date: 3/12/2021  Patient name: Geovanni Wilkinson  : 1940  MRN: [id number]  Referring provider: Jamar Solano MD  Dx:   Encounter Diagnosis     ICD-10-CM    1  Status post right knee replacement  Z96 651    2  Chronic pain of right knee  M25 561     G89 29                   Assessment  Assessment details: Patient is a 69 y/o female s/p R TKA on 20  Since starting therapy, she presents with less pain, improved knee ROM, improved knee and hip strength, improved gait no longer requiring SPC for ambulation  She exhibits improved endurance and improved function, including improved score on FOTO  She remains with mild extension ROM impairments, decreased endurance, and presence of pain  She will benefit from reducing to 1 time per week, but continuation of therapy for the next 4 weeks  Patient in agreement  She continues to require skilled PT to address remaining impairments and allow for maximal function  She is motivated for therapy  Impairments: abnormal or restricted ROM, activity intolerance, impaired physical strength, lacks appropriate home exercise program and pain with function  Understanding of Dx/Px/POC: good   Prognosis: good    Goals  STG within 4 weeks:   1  Patient to be independent in HEP  met  2  Reduce pain by 50% to improve quality of life  MET   3  Improve knee flexion to 120 degrees to improve stairs and gait  MET  4  Improve knee extension to 0 degrees to improve gait  Partially met  LTG within 8 weeks:   1  Patient to be independent in ADLs/IADLs without difficulty  PARTIALLY MET  2  Improve knee strength to at least 4  MET  3  Patient to be able to ambulate community distances with appropriate assistive device  MET  4  Patient to be able to perform reciprocal stairs   MET    Plan  Patient would benefit from: skilled physical therapy and PT eval  Planned modality interventions: cryotherapy, hydrotherapy and unattended electrical stimulation  Planned therapy interventions: therapeutic training, therapeutic exercise, therapeutic activities, stretching, strengthening, postural training, patient education, neuromuscular re-education, manual therapy, joint mobilization, IADL retraining, activity modification, ADL retraining, ADL training, body mechanics training, flexibility, functional ROM exercises, gait training, graded activity, graded exercise, graded motor, home exercise program and balance/weight bearing training  Frequency: 1x week  Duration in weeks: 4  Plan of Care beginning date: 3/12/2021  Plan of Care expiration date: 2021  Treatment plan discussed with: patient        Subjective Evaluation    History of Present Illness  Mechanism of injury: Patient is a 69 y/o female s/p right TKA on 20  She arrives on 3/12/21 for re-evaluation without her cane  She has been able to ambulate without assistive device  States she can do "everything" at home, except is still having trouble with extension, she states  Patient reports she ambulated 5,700 steps yesterday, which is much improved from before surgery  She states she did have to rest and ice by the end of the day though     Pain  Current pain ratin  At best pain ratin  At worst pain ratin  Quality: tight  Progression: improved    Social Support  Steps to enter house: yes  Stairs in house: yes   Lives in: multiple-level home  Lives with: spouse    Employment status: not working (retired)  Hand dominance: right  Exercise history: some knee exercises that the surgeon's office gave her     Patient Goals  Patient goals for therapy: decreased pain and independence with ADLs/IADLs  Patient goal: "I want to be able to walk with no pain at all " "I don't want to limp when I walk "         Objective     Active Range of Motion     Right Knee   Flexion: 133 degrees   Extension: -3 degrees     Strength/Myotome Testing     Left Knee   Quadriceps contraction: fair    Right Knee   Flexion: 4+  Extension: 4+  Quadriceps contraction: fair    Ambulation   Weight-Bearing Status   Assistive device used: none    Additional Weight-Bearing Status Details  Timed Up and Go: average of 11 3 seconds (without assistive device)    Ambulation: Level Surfaces   Ambulation with assistive device: independent             Precautions: h/o endometrial cancer, h/o L TKA, R TKA 12/21/20           Manuals 3/2 3/5 3/9 3/12    2/17 2/22 2/25   STR Posterior R knee   5'      IASTM 8' IASTM 5'     IASTM HS  5'           STR w roller HS             STR medial knee          IASTM 5'     Re-eval    KS         Pain assessment        NV See Obj       Neuro Re-Ed             Quad set  3"X20  3"x20  3"x20      3"x20 w VC & MC  3"x20 w VC  3"x20 w VC/MC   Towel crush w NMES (10" on, 10" off)             biodex LOS Floor/foamLv 10 x3 ea Floor/foamLv 10 x3 ea Floor/foam Lv 9 x 4 ea Floor/foam Lv 9 x 4 ea    Lv 11 x 5 Lv 11 x 5 Floor/foam Lv 11-10 x 3 ea   Standing TB pressback  Red 2x10  Red x15; into SLB red x 15 Red into SLB x 20     Red 3"x 20   Red 3"x20    Prone quad set   3"x20       3"x20      SAQ                          Ther Ex             Pt Edu  KS KS          Recumbent bike  10' 10' 10' 10'    Full 10' Full 10' Full 10'   Hamstring stretch seated    Stand 4x15"         Standing gastroc stretch at wall    Stand 4x15"         LAQ    2# 2x10      2x10  2x10    Manual HS and gastroc stretch by PT    NV      KS    Seated knee extension hang Prone 2 min Prone 3 min      Prone 3'      Mini squats 2x10  2x10  2x10       2x10    Heel raises  2x10        2x10  2x10  2x10    Standing hip abd/ext Red 2x10 ea        Yellow 2x10  Yellow 2x10 ea   Side step w band Red x 6            Knee extension overpressure  KS 10"x5 KS 10" x5 KS          Ther Activity                                       Gait Training                          Tandem gait at rail x6 x6 x6 x6         Step up  6" front x10   biodex x20     6" front/side x20 ea  6" front/side x 20 ea Step overs  6" x10         6" x 10    Up/down stairs  x2           Modalities             Cold pack R knee 8 min decline  decline    Decline  Decline normal... Well appearing, awake, alert, oriented to person, place, time/situation and in no apparent distress. Well appearing, awake, alert, oriented to person, place, time/situation and in no apparent distress. Nontoxic appearing.

## 2021-03-12 NOTE — LETTER
2021    [facility], [address] [place] [address]  [address] [place] [id number]    Patient: Cami See   YOB: 1940   Date of Visit: 3/12/2021     Encounter Diagnosis     ICD-10-CM    1  Status post right knee replacement  Z96 651    2  Chronic pain of right knee  M25 561     G89 29        Dear Dr Esme Wilson:    Thank you for your recent referral of Cami See  Please review the attached evaluation summary from Melinda's recent visit  Please verify that you agree with the plan of care by signing the attached order  If you have any questions or concerns, please do not hesitate to call  I sincerely appreciate the opportunity to share in the care of one of your patients and hope to have another opportunity to work with you in the near future  Sincerely,    Elvis Zapata, PT      Referring Provider:      I certify that I have read the below Plan of Care and certify the need for these services furnished under this plan of treatment while under my care  Jazzy Castillo MD  [place] [phone]  Via Fax: [phone]          PT Re-Evaluation     Today's date: 3/12/2021  Patient name: Cami See  : 1940  MRN: [id number]  Referring provider: Kiah Perez MD  Dx:   Encounter Diagnosis     ICD-10-CM    1  Status post right knee replacement  Z96 651    2  Chronic pain of right knee  M25 561     G89 29                   Assessment  Assessment details: Patient is a 69 y/o female s/p R TKA on 20  Since starting therapy, she presents with less pain, improved knee ROM, improved knee and hip strength, improved gait no longer requiring SPC for ambulation  She exhibits improved endurance and improved function, including improved score on FOTO  She remains with mild extension ROM impairments, decreased endurance, and presence of pain  She will benefit from reducing to 1 time per week, but continuation of therapy for the next 4 weeks    Patient in agreement  She continues to require skilled PT to address remaining impairments and allow for maximal function  She is motivated for therapy  Impairments: abnormal or restricted ROM, activity intolerance, impaired physical strength, lacks appropriate home exercise program and pain with function  Understanding of Dx/Px/POC: good   Prognosis: good    Goals  STG within 4 weeks:   1  Patient to be independent in HEP  met  2  Reduce pain by 50% to improve quality of life  MET   3  Improve knee flexion to 120 degrees to improve stairs and gait  MET  4  Improve knee extension to 0 degrees to improve gait  Partially met  LTG within 8 weeks:   1  Patient to be independent in ADLs/IADLs without difficulty  PARTIALLY MET  2  Improve knee strength to at least 4  MET  3  Patient to be able to ambulate community distances with appropriate assistive device  MET  4  Patient to be able to perform reciprocal stairs  MET    Plan  Patient would benefit from: skilled physical therapy and PT eval  Planned modality interventions: cryotherapy, hydrotherapy and unattended electrical stimulation  Planned therapy interventions: therapeutic training, therapeutic exercise, therapeutic activities, stretching, strengthening, postural training, patient education, neuromuscular re-education, manual therapy, joint mobilization, IADL retraining, activity modification, ADL retraining, ADL training, body mechanics training, flexibility, functional ROM exercises, gait training, graded activity, graded exercise, graded motor, home exercise program and balance/weight bearing training  Frequency: 1x week  Duration in weeks: 4  Plan of Care beginning date: 3/12/2021  Plan of Care expiration date: 4/9/2021  Treatment plan discussed with: patient        Subjective Evaluation    History of Present Illness  Mechanism of injury: Patient is a 69 y/o female s/p right TKA on 12/21/20  She arrives on 3/12/21 for re-evaluation without her cane   She has been able to ambulate without assistive device  States she can do "everything" at home, except is still having trouble with extension, she states  Patient reports she ambulated 5,700 steps yesterday, which is much improved from before surgery  She states she did have to rest and ice by the end of the day though  Pain  Current pain ratin  At best pain ratin  At worst pain ratin  Quality: tight  Progression: improved    Social Support  Steps to enter house: yes  Stairs in house: yes   Lives in: multiple-level home  Lives with: spouse    Employment status: not working (retired)  Hand dominance: right  Exercise history: some knee exercises that the surgeon's office gave her     Patient Goals  Patient goals for therapy: decreased pain and independence with ADLs/IADLs  Patient goal: "I want to be able to walk with no pain at all " "I don't want to limp when I walk "         Objective     Active Range of Motion     Right Knee   Flexion: 133 degrees   Extension: -3 degrees     Strength/Myotome Testing     Left Knee   Quadriceps contraction: fair    Right Knee   Flexion: 4+  Extension: 4+  Quadriceps contraction: fair    Ambulation   Weight-Bearing Status   Ambulation assistive devices: cane with larger attachment on the bottom     Ambulation: Level Surfaces   Ambulation with assistive device: independent             Precautions: h/o endometrial cancer, h/o L TKA, R TKA 20           Manuals 3/ 3/5 3/9 3/12    2/17 2/22 2/25   STR Posterior R knee   5'      IASTM 8' IASTM 5'     IASTM HS  5'           STR w roller HS             STR medial knee          IASTM 5'     Re-eval    KS         Pain assessment        NV See Obj       Neuro Re-Ed             Quad set  3"X20  3"x20  3"x20      3"x20 w VC & MC  3"x20 w VC  3"x20 w VC/MC   Towel crush w NMES (10" on, 10" off)             biodex LOS Floor/foamLv 10 x3 ea Floor/foamLv 10 x3 ea Floor/foam Lv 9 x 4 ea Floor/foam Lv 9 x 4 ea    Lv 11 x 5 Lv 11 x 5 Floor/foam Lv 11-10 x 3 ea   Standing TB pressback  Red 2x10  Red x15; into SLB red x 15 Red into SLB x 20     Red 3"x 20   Red 3"x20    Prone quad set   3"x20       3"x20      SAQ                          Ther Ex             Pt Edu  KS KS          Recumbent bike  10' 10' 10' 10'    Full 10' Full 10' Full 10'   Hamstring stretch seated    Stand 4x15"         Standing gastroc stretch at wall    Stand 4x15"         LAQ    2# 2x10      2x10  2x10    Manual HS and gastroc stretch by PT    NV      KS    Seated knee extension hang Prone 2 min Prone 3 min      Prone 3'      Mini squats 2x10  2x10  2x10       2x10    Heel raises  2x10        2x10  2x10  2x10    Standing hip abd/ext Red 2x10 ea        Yellow 2x10  Yellow 2x10 ea   Side step w band Red x 6            Knee extension overpressure  KS 10"x5 KS 10" x5 KS          Ther Activity                                       Gait Training                          Tandem gait at rail x6 x6 x6 x6         Step up  6" front x10   biodex x20     6" front/side x20 ea  6" front/side x 20 ea   Step overs  6" x10         6" x 10    Up/down stairs  x2           Modalities             Cold pack R knee 8 min decline  decline    Decline  Decline

## 2021-03-16 ENCOUNTER — OFFICE VISIT (OUTPATIENT)
Dept: PHYSICAL THERAPY | Facility: CLINIC | Age: 81
End: 2021-03-16
Payer: COMMERCIAL

## 2021-03-16 DIAGNOSIS — M25.561 CHRONIC PAIN OF RIGHT KNEE: ICD-10-CM

## 2021-03-16 DIAGNOSIS — G89.29 CHRONIC PAIN OF RIGHT KNEE: ICD-10-CM

## 2021-03-16 DIAGNOSIS — Z96.651 STATUS POST RIGHT KNEE REPLACEMENT: Primary | ICD-10-CM

## 2021-03-16 PROCEDURE — 97110 THERAPEUTIC EXERCISES: CPT | Performed by: PHYSICAL THERAPIST

## 2021-03-16 PROCEDURE — 97112 NEUROMUSCULAR REEDUCATION: CPT | Performed by: PHYSICAL THERAPIST

## 2021-03-16 NOTE — PROGRESS NOTES
Daily Note     Today's date: 3/16/2021  Patient name: Alvena Halsted  : 1940  MRN: [id number]  Referring provider: Stella Bruce MD  Dx:   Encounter Diagnosis     ICD-10-CM    1  Status post right knee replacement  Z96 651    2  Chronic pain of right knee  M25 561     G89 29                   Subjective: Patient states she is doing well  Objective: See treatment diary below      Assessment: Tolerated treatment well  She is challenged with session, fatiguing between exercises requiring seated rest  Continued improved extension noted overall  She is also able to perform reciprocal step overs  Patient would benefit from continued PT      Plan: Continue per plan of care  with one time per week of formal therapy and continuation of HEP  Precautions: h/o endometrial cancer, h/o L TKA, R TKA 20           Manuals 3/2 3/5 3/9 3/12 3/16   2/17 2/22 2/25   STR Posterior R knee   5'      IASTM 8' IASTM 5'     IASTM HS  5'           STR w roller HS             STR medial knee          IASTM 5'     Re-eval    KS         Pain assessment        NV See Obj       Neuro Re-Ed             Quad set  3"X20  3"x20  3"x20   3"x20    3"x20 w VC & MC  3"x20 w VC  3"x20 w VC/MC   Towel crush w NMES (10" on, 10" off)             biodex LOS Floor/foamLv 10 x3 ea Floor/foamLv 10 x3 ea Floor/foam Lv 9 x 4 ea Floor/foam Lv 9 x 4 ea Floor/foam Lv 9 x 3 ea   Lv 11 x 5 Lv 11 x 5 Floor/foam Lv 11-10 x 3 ea   Standing TB pressback  Red 2x10  Red x15; into SLB red x 15 Red into SLB x 20  Red into SLB x 20    Red 3"x 20   Red 3"x20    Prone quad set   3"x20    3"x20    3"x20      SAQ                          Ther Ex             Pt Edu  KS KS          Recumbent bike  10' 10' 10' 10' 10'   Full 10' Full 10' Full 10'   Hamstring stretch seated    Stand 4x15" Stand 4 x15"        Standing gastroc stretch at wall    Stand 4x15" Stand 4 x15"         LAQ    2# 2x10  2# 2x10     2x10  2x10    Manual HS and gastroc stretch by PT KS    Seated knee extension hang Prone 2 min Prone 3 min   Prone 3 min    Prone 3'      Mini squats 2x10  2x10  2x10  2x10      2x10    Heel raises  2x10        2x10  2x10  2x10    Standing hip abd/ext Red 2x10 ea        Yellow 2x10  Yellow 2x10 ea   Side step w band Red x 6            Knee extension overpressure  KS 10"x5 KS 10" x5 KS          Ther Activity                                       Gait Training                          Tandem gait at rail x6 x6 x6 x6         Step up  6" front x10   biodex x20     6" front/side x20 ea  6" front/side x 20 ea   Step overs  6" x10    forward/side 6"x20 ea     6" x 10    Up/down stairs  x2           Modalities             Cold pack R knee 8 min decline  decline decline   Decline  Decline

## 2021-03-19 ENCOUNTER — APPOINTMENT (OUTPATIENT)
Dept: PHYSICAL THERAPY | Facility: CLINIC | Age: 81
End: 2021-03-19
Payer: COMMERCIAL

## 2021-03-23 ENCOUNTER — OFFICE VISIT (OUTPATIENT)
Dept: PHYSICAL THERAPY | Facility: CLINIC | Age: 81
End: 2021-03-23
Payer: COMMERCIAL

## 2021-03-23 DIAGNOSIS — Z96.651 STATUS POST RIGHT KNEE REPLACEMENT: Primary | ICD-10-CM

## 2021-03-23 DIAGNOSIS — M25.561 CHRONIC PAIN OF RIGHT KNEE: ICD-10-CM

## 2021-03-23 DIAGNOSIS — G89.29 CHRONIC PAIN OF RIGHT KNEE: ICD-10-CM

## 2021-03-23 PROCEDURE — 97112 NEUROMUSCULAR REEDUCATION: CPT | Performed by: PHYSICAL THERAPIST

## 2021-03-23 PROCEDURE — 97110 THERAPEUTIC EXERCISES: CPT | Performed by: PHYSICAL THERAPIST

## 2021-03-23 NOTE — PROGRESS NOTES
Daily Note     Today's date: 3/23/2021  Patient name: Petra Jorgensen  : 1940  MRN: [id number]  Referring provider: Sneha Wong MD  Dx:   Encounter Diagnosis     ICD-10-CM    1  Status post right knee replacement  Z96 651    2  Chronic pain of right knee  M25 561     G89 29                   Subjective: Patient states she is doing "great"  States she's been able to go up and down stairs reciprocally  Objective: See treatment diary below      Assessment: Tolerated treatment well  Patient exhibits reciprocal performance ascending and descending stairs, which is improved within last two weeks  She remains with slight terminal knee extension limitation, but this improves from start to finish of session  Patient to see surgeon for follow up tomorrow, as scheduled  Plan to finish out PT, prior to d/c to HEP  Patient is pleased with her progress and has no complaints post session  Plan: Continue per plan of care  with one time per week of formal therapy and continuation of HEP  Precautions: h/o endometrial cancer, h/o L TKA, R TKA 20           Manuals 3/2 3/5 3/9 3/12 3/16 3/23  2/17 2/22 2/25   STR Posterior R knee   5'      IASTM 8' IASTM 5'     IASTM HS  5'           STR w roller HS             STR medial knee          IASTM 5'     Re-eval    KS         Pain assessment        NV See Obj       Neuro Re-Ed             Quad set  3"X20  3"x20  3"x20   3"x20  3"x20  3"x20 w VC & MC  3"x20 w VC  3"x20 w VC/MC   Towel crush w NMES (10" on, 10" off)             biodex LOS Floor/foamLv 10 x3 ea Floor/foamLv 10 x3 ea Floor/foam Lv 9 x 4 ea Floor/foam Lv 9 x 4 ea Floor/foam Lv 9 x 3 ea Floor/foam Lv 9 x 3 ea  Lv 11 x 5 Lv 11 x 5 Floor/foam Lv 11-10 x 3 ea   Standing TB pressback  Red 2x10  Red x15; into SLB red x 15 Red into SLB x 20  Red into SLB x 20  Black into SLB x 20  Red 3"x 20   Red 3"x20    Prone quad set   3"x20    3"x20    3"x20      SAQ                          Ther Ex             Pt Edu  KS KS          Recumbent bike  10' 10' 10' 10' 10' 10'  Full 10' Full 10' Full 10'   Hamstring stretch seated    Stand 4x15" Stand 4 x15" Stand 5x20"        Standing gastroc stretch at wall    Stand 4x15" Stand 4 x15"  By PT 5x20"       LAQ    2# 2x10  2# 2x10  2# 2x10 ea   2x10  2x10    Manual HS and gastroc stretch by PT          KS    Seated knee extension hang Prone 2 min Prone 3 min   Prone 3 min    Prone 3'      Mini squats 2x10  2x10  2x10  2x10  2x10     2x10    Heel raises  2x10        2x10  2x10  2x10    Standing hip abd/ext Red 2x10 ea     Red 2x10 ea   Yellow 2x10  Yellow 2x10 ea   Side step w band Red x 6     Red x6       Knee extension overpressure  KS 10"x5 KS 10" x5 KS          Ther Activity                                       Gait Training                          Tandem gait at rail x6 x6 x6 x6  x6       Step up  6" front x10   biodex x20   biodex front/side 2x10 ea  6" front/side x20 ea  6" front/side x 20 ea   Step overs  6" x10    forward/side 6"x20 ea Forward 6"x20     6" x 10    Up/down stairs  x2    x1       Modalities             Cold pack R knee 8 min decline  decline decline   Decline  Decline

## 2021-03-26 ENCOUNTER — OFFICE VISIT (OUTPATIENT)
Dept: INTERNAL MEDICINE CLINIC | Facility: CLINIC | Age: 81
End: 2021-03-26
Payer: COMMERCIAL

## 2021-03-26 ENCOUNTER — APPOINTMENT (OUTPATIENT)
Dept: PHYSICAL THERAPY | Facility: CLINIC | Age: 81
End: 2021-03-26
Payer: COMMERCIAL

## 2021-03-26 VITALS
OXYGEN SATURATION: 96 % | TEMPERATURE: 98.2 F | HEIGHT: 62 IN | HEART RATE: 89 BPM | WEIGHT: 185.8 LBS | SYSTOLIC BLOOD PRESSURE: 122 MMHG | DIASTOLIC BLOOD PRESSURE: 78 MMHG | BODY MASS INDEX: 34.19 KG/M2

## 2021-03-26 DIAGNOSIS — M81.0 AGE-RELATED OSTEOPOROSIS WITHOUT CURRENT PATHOLOGICAL FRACTURE: Primary | ICD-10-CM

## 2021-03-26 DIAGNOSIS — M79.604 LEG PAIN, CENTRAL, RIGHT: ICD-10-CM

## 2021-03-26 DIAGNOSIS — C54.1 MALIGNANT NEOPLASM OF ENDOMETRIUM (HCC): ICD-10-CM

## 2021-03-26 DIAGNOSIS — M54.10 RADICULOPATHY, UNSPECIFIED SPINAL REGION: ICD-10-CM

## 2021-03-26 PROCEDURE — 99214 OFFICE O/P EST MOD 30 MIN: CPT | Performed by: PHYSICIAN ASSISTANT

## 2021-03-26 PROCEDURE — 1160F RVW MEDS BY RX/DR IN RCRD: CPT | Performed by: PHYSICIAN ASSISTANT

## 2021-03-26 PROCEDURE — 1036F TOBACCO NON-USER: CPT | Performed by: PHYSICIAN ASSISTANT

## 2021-03-26 RX ORDER — CYCLOBENZAPRINE HCL 10 MG
10 TABLET ORAL 3 TIMES DAILY PRN
Qty: 30 TABLET | Refills: 2 | Status: SHIPPED | OUTPATIENT
Start: 2021-03-26 | End: 2021-08-20 | Stop reason: CLARIF

## 2021-03-26 NOTE — PROGRESS NOTES
Assessment/Plan:  Episodes of severe pain right lateral thigh right medial thigh that radiates into right calf  since her last knee surgery   Orthopedist has ruled out DVT and feels that her symptoms are not related to her knee surgeries  Currently no similar pain she has chronic pain in the right knee will x-ray her spine and hip try Flexeril and refer to Neurology       Diagnoses and all orders for this visit:    Age-related osteoporosis without current pathological fracture    Leg pain, central, right  -     XR spine lumbar 2 or 3 views injury; Future  -     XR hip/pelv 2-3 vws right if performed; Future  -     Ambulatory referral to Neurology; Future  -     cyclobenzaprine (FLEXERIL) 10 mg tablet; Take 1 tablet (10 mg total) by mouth 3 (three) times a day as needed for muscle spasms    Radiculopathy, unspecified spinal region  -     XR spine lumbar 2 or 3 views injury; Future  -     XR hip/pelv 2-3 vws right if performed; Future  -     Ambulatory referral to Neurology; Future  -     cyclobenzaprine (FLEXERIL) 10 mg tablet; Take 1 tablet (10 mg total) by mouth 3 (three) times a day as needed for muscle spasms    Malignant neoplasm of endometrium ([facility] [address] )        No problem-specific Assessment & Plan notes found for this encounter  BMI Counseling: Body mass index is 33 98 kg/m²  The BMI is above normal  Nutrition recommendations include decreasing portion sizes  Exercise recommendations include exercising 3-5 times per week  Subjective:      Patient ID: Armand Sanabria is a 68 y o  female  She has had pain in her right leg since her most recent right knee surgery  Total knee arthroplasty on the right in January  She describes episodes of severe pain right lateral 5 the back of her leg and radiates to the medial part of her thigh to her right calf  These episodes of severe pain usually occur at night in bed  She is unable to reproduce the pain by bending her knee bending or twisting her back    No numbness or worsening weakness she has chronic pain in that knee  She has had 3  Knee surgeries  She has complained to her orthopedic surgeon about this pain which occurs nearly every day and is becoming worse  She has pain in her knee when she walks but this has been chronic  No numbness the knee does not give out on her  She otherwise feels well no chest pain shortness of breath fever chills palpitations or dizziness  Repeat labs from January unrevealing      The following portions of the patient's history were reviewed and updated as appropriate:   She has a past medical history of Arthritis, Asthma, Benign colon polyp, Endometrial cancer ([facility] [address] ) (2015), Esophagitis, reflux, Hemorrhoids, internal, and Sebaceous gland disease  ,  does not have any pertinent problems on file  ,   has a past surgical history that includes Appendectomy; Oophorectomy; Lymph node biopsy; Hysterectomy; Laparoscopy; Knee arthroscopy; Robotic assisted hysterectomy; Wrist surgery; Salpingoophorectomy (Bilateral); Lymphadenectomy; Tubal ligation; HAND FRACTURE REPAIR; Lymphadenectomy; pr total knee arthroplasty (Left, 2/18/2019); and Cataract extraction (Right, 2021)  ,  family history includes Breast cancer (age of onset: 28) in her mother; Cancer in her father and mother; No Known Problems in her daughter, maternal aunt, maternal aunt, maternal aunt, maternal grandmother, paternal aunt, paternal aunt, and paternal grandmother; Stomach cancer in her father  ,   reports that she has never smoked  She has never used smokeless tobacco  She reports previous alcohol use  She reports that she does not use drugs  ,  has No Known Allergies     Current Outpatient Medications   Medication Sig Dispense Refill    aspirin 81 mg chewable tablet Chew 81 mg daily      Cyanocobalamin (VITAMIN B-12) 1000 MCG/15ML LIQD Take 4 tablets by mouth daily      dorzolamide-timolol (COSOPT) 22 3-6 8 MG/ML ophthalmic solution       ergocalciferol (VITAMIN D2) 50,000 units TAKE 1 CAPSULE BY MOUTH ONE TIME PER WEEK 12 capsule 0    latanoprost (XALATAN) 0 005 % ophthalmic solution Apply 1 drop to eye      Multiple Vitamins-Minerals (MULTI-B-PLUS) TABS Take by mouth      neomycin-polymyxin-hydrocortisone (CORTISPORIN) otic solution Administer 4 drops to the right ear every 8 (eight) hours 20 mL 3    TURMERIC PO Take 500 Units by mouth daily      calcium carbonate-vitamin D (OSCAL-D) 500 mg-200 units per tablet Take 1 tablet by mouth 2 (two) times a day with meals      cyclobenzaprine (FLEXERIL) 10 mg tablet Take 1 tablet (10 mg total) by mouth 3 (three) times a day as needed for muscle spasms 30 tablet 2    ibuprofen (MOTRIN) 600 mg tablet Take 1 tablet (600 mg total) by mouth every 8 (eight) hours as needed for mild pain or moderate pain (Patient not taking: Reported on 1/18/2021) 90 tablet 0    tacrolimus (PROTOPIC) 0 1 % ointment        No current facility-administered medications for this visit  Review of Systems   Constitutional: Negative for chills and fever  HENT: Negative for ear pain and sore throat  Eyes: Negative for pain and visual disturbance  Respiratory: Negative for cough and shortness of breath  Cardiovascular: Negative for chest pain and palpitations  Gastrointestinal: Negative for abdominal pain and vomiting  Genitourinary: Negative for dysuria and hematuria  Musculoskeletal: Positive for arthralgias, gait problem and myalgias  Negative for back pain  Skin: Negative for color change and rash  Neurological: Negative for seizures and syncope  All other systems reviewed and are negative  Objective:  Vitals:    03/26/21 0958   BP: 122/78   BP Location: Left arm   Patient Position: Sitting   Cuff Size: Large   Pulse: 89   Temp: 98 2 °F (36 8 °C)   TempSrc: Temporal   SpO2: 96%   Weight: 84 3 kg (185 lb 12 8 oz)   Height: 5' 2" (1 575 m)     Body mass index is 33 98 kg/m²  Physical Exam  Vitals signs reviewed  Constitutional:       Appearance: She is well-developed  She is obese  HENT:      Head: Normocephalic  Right Ear: Tympanic membrane and external ear normal       Left Ear: Tympanic membrane and external ear normal       Nose: Nose normal    Eyes:      Extraocular Movements: Extraocular movements intact  Conjunctiva/sclera: Conjunctivae normal       Pupils: Pupils are equal, round, and reactive to light  Neck:      Musculoskeletal: Normal range of motion and neck supple  Thyroid: No thyromegaly  Cardiovascular:      Rate and Rhythm: Normal rate and regular rhythm  Pulses: Normal pulses  Heart sounds: Normal heart sounds  Pulmonary:      Effort: Pulmonary effort is normal  No respiratory distress  Breath sounds: Normal breath sounds  Abdominal:      General: Abdomen is flat  Bowel sounds are normal       Palpations: Abdomen is soft  Musculoskeletal: Normal range of motion  General: Tenderness ( distal and of her vertical right knee scar) present  Skin:     General: Skin is warm and dry  Neurological:      General: No focal deficit present  Mental Status: She is alert and oriented to person, place, and time  Cranial Nerves: No cranial nerve deficit  Sensory: No sensory deficit  Gait: Gait abnormal ( walks with a limp favors her right leg)  Deep Tendon Reflexes: Reflexes abnormal ( symmetrically diminished)  Comments:  Straight leg lift negative both sides   Psychiatric:         Mood and Affect: Mood normal          Thought Content:  Thought content normal          Judgment: Judgment normal

## 2021-03-30 ENCOUNTER — OFFICE VISIT (OUTPATIENT)
Dept: PHYSICAL THERAPY | Facility: CLINIC | Age: 81
End: 2021-03-30
Payer: COMMERCIAL

## 2021-03-30 DIAGNOSIS — M25.561 CHRONIC PAIN OF RIGHT KNEE: ICD-10-CM

## 2021-03-30 DIAGNOSIS — Z96.651 STATUS POST RIGHT KNEE REPLACEMENT: Primary | ICD-10-CM

## 2021-03-30 DIAGNOSIS — G89.29 CHRONIC PAIN OF RIGHT KNEE: ICD-10-CM

## 2021-03-30 PROCEDURE — 97112 NEUROMUSCULAR REEDUCATION: CPT | Performed by: PHYSICAL THERAPIST

## 2021-03-30 PROCEDURE — 97110 THERAPEUTIC EXERCISES: CPT | Performed by: PHYSICAL THERAPIST

## 2021-03-30 NOTE — PROGRESS NOTES
Daily Note     Today's date: 3/30/2021  Patient name: Dinah Lopez  : 1940  MRN: [id number]  Referring provider: Jenny Moses MD  Dx:   Encounter Diagnosis     ICD-10-CM    1  Status post right knee replacement  Z96 651    2  Chronic pain of right knee  M25 561     G89 29                   Subjective: Patient states she saw her doctor last week who was pleased with her progress in therapy  She states she will be seeing a neurologist for leg pain that her doctor thinks is arising from her back  She states, "My leg is straighter "       Objective: See treatment diary below      Assessment: Tolerated treatment well  Patient requires verbal cueing for correct technique of exercise program   Will provide updated written HEP at next visit, in preparation for discharge  Plan: Continue per plan of care  with one time per week of formal therapy and continuation of HEP        Precautions: h/o endometrial cancer, h/o L TKA, R TKA 20           Manuals 3/2 3/5 3/9 3/12 3/16 3/23 3/30      STR Posterior R knee   5'           IASTM HS  5'           STR w roller HS             STR medial knee              Re-eval    KS         Pain assessment             Neuro Re-Ed             Quad set  3"X20  3"x20  3"x20   3"x20  3"x20       Towel crush w NMES (10" on, 10" off)             biodex LOS Floor/foamLv 10 x3 ea Floor/foamLv 10 x3 ea Floor/foam Lv 9 x 4 ea Floor/foam Lv 9 x 4 ea Floor/foam Lv 9 x 3 ea Floor/foam Lv 9 x 3 ea Foam lv 9 x 5      Standing TB pressback  Red 2x10  Red x15; into SLB red x 15 Red into SLB x 20  Red into SLB x 20  Black into SLB x 20 Black into SLB x 20 ea      Prone quad set   3"x20    3"x20   3"x20      SAQ       Towel crush 3"X20      SLB on foam        2x20" ea      Ther Ex             Pt Edu  KS KS          Recumbent bike  10' 10' 10' 10' 10' 10' 10'      Hamstring stretch seated    Stand 4x15" Stand 4 x15" Stand 5x20"        Standing gastroc stretch at wall    Stand 4x15" Stand 4 x15"  By PT 5x20"       LAQ    2# 2x10  2# 2x10  2# 2x10 ea 3# 2x10 ea      Manual HS and gastroc stretch by PT              Seated knee extension hang Prone 2 min Prone 3 min   Prone 3 min         Mini squats 2x10  2x10  2x10  2x10  2x10  2x10       Heel raises  2x10             Standing hip abd/ext Red 2x10 ea     Red 2x10 ea Green 2x10 ea      Side step w band Red x 6     Red x6 Green x 6      Knee extension overpressure  KS 10"x5 KS 10" x5 KS          Ther Activity                                       Gait Training                          Tandem gait at rail x6 x6 x6 x6  x6 x6      Step up  6" front x10   biodex x20   biodex front/side 2x10 ea       Step overs  6" x10    forward/side 6"x20 ea Forward 6"x20        Up/down stairs  x2    x1       Modalities             Cold pack R knee 8 min decline  decline decline  decline

## 2021-06-02 ENCOUNTER — TELEPHONE (OUTPATIENT)
Dept: NEUROLOGY | Facility: CLINIC | Age: 81
End: 2021-06-02

## 2021-06-02 NOTE — TELEPHONE ENCOUNTER
Best contact number for patient: [phone]     Emergency Contact name and number: Avery Alba [phone]     Referring provider and telephone number:    Primary Care Provider Name and if affiliated with [facility]:     Reason for Appointment/Dx: radiculopathy     Have you seen and followed up with a pediatric Neurologist for this disease in the past?      No (If yes ok to schedule with Dr Ilya Hook)    Neurology Location patient would like to be seen:    Order received? Yes                                                 Records Received? Yes    Have you ever seen another Neurologist?       No    Insurance Information    Insurance Name:    ID/Policy #:    Secondary Insurance:    ID/Policy#: Workman's Comp/ Accident/ School  Information      Workman's Comp/Accident/School related?        No    If yes name of Insurance company:    Claim #:    Date of Injury:    Type of Injury:     Name and Telephone Number:    Notes:                   Appointment date: 09/23/2021

## 2021-06-22 NOTE — PROGRESS NOTES
PT DISCHARGE:  Patient cancelled her discharge evaluation and scheduled for 4/6/21 and failed to reschedule  She failed to return multiple calls to clinic  Treatment cycle to be discharged

## 2021-06-28 ENCOUNTER — TELEPHONE (OUTPATIENT)
Dept: INTERNAL MEDICINE CLINIC | Facility: CLINIC | Age: 81
End: 2021-06-28

## 2021-06-28 NOTE — TELEPHONE ENCOUNTER
Pt is leaving 07/02/21 needs a rapid covid test 72 hrs prior, please advise, call back    Pt wants to make sure rapid test is for travelers

## 2021-08-20 ENCOUNTER — OFFICE VISIT (OUTPATIENT)
Dept: INTERNAL MEDICINE CLINIC | Facility: CLINIC | Age: 81
End: 2021-08-20
Payer: COMMERCIAL

## 2021-08-20 ENCOUNTER — APPOINTMENT (OUTPATIENT)
Dept: LAB | Facility: CLINIC | Age: 81
End: 2021-08-20
Payer: COMMERCIAL

## 2021-08-20 VITALS
SYSTOLIC BLOOD PRESSURE: 116 MMHG | DIASTOLIC BLOOD PRESSURE: 72 MMHG | TEMPERATURE: 97.7 F | OXYGEN SATURATION: 96 % | WEIGHT: 185.2 LBS | HEART RATE: 60 BPM | HEIGHT: 62 IN | BODY MASS INDEX: 34.08 KG/M2

## 2021-08-20 DIAGNOSIS — R27.0 ATAXIA: ICD-10-CM

## 2021-08-20 DIAGNOSIS — R29.6 FREQUENT FALLS: ICD-10-CM

## 2021-08-20 DIAGNOSIS — Z85.42 HISTORY OF ENDOMETRIAL CANCER: ICD-10-CM

## 2021-08-20 DIAGNOSIS — E55.9 VITAMIN D DEFICIENCY: ICD-10-CM

## 2021-08-20 DIAGNOSIS — G62.9 NEUROPATHY: ICD-10-CM

## 2021-08-20 DIAGNOSIS — R27.0 ATAXIA: Primary | ICD-10-CM

## 2021-08-20 LAB
ALBUMIN SERPL BCP-MCNC: 3.7 G/DL (ref 3.5–5)
ALP SERPL-CCNC: 72 U/L (ref 46–116)
ALT SERPL W P-5'-P-CCNC: 24 U/L (ref 12–78)
ANION GAP SERPL CALCULATED.3IONS-SCNC: 4 MMOL/L (ref 4–13)
AST SERPL W P-5'-P-CCNC: 17 U/L (ref 5–45)
BASOPHILS # BLD AUTO: 0.03 THOUSANDS/ΜL (ref 0–0.1)
BASOPHILS NFR BLD AUTO: 1 % (ref 0–1)
BILIRUB SERPL-MCNC: 0.73 MG/DL (ref 0.2–1)
BILIRUB UR QL STRIP: NEGATIVE
BUN SERPL-MCNC: 19 MG/DL (ref 5–25)
CALCIUM SERPL-MCNC: 9.1 MG/DL (ref 8.3–10.1)
CHLORIDE SERPL-SCNC: 110 MMOL/L (ref 100–108)
CLARITY UR: CLEAR
CO2 SERPL-SCNC: 26 MMOL/L (ref 21–32)
COLOR UR: YELLOW
CREAT SERPL-MCNC: 0.62 MG/DL (ref 0.6–1.3)
EOSINOPHIL # BLD AUTO: 0.11 THOUSAND/ΜL (ref 0–0.61)
EOSINOPHIL NFR BLD AUTO: 3 % (ref 0–6)
ERYTHROCYTE [DISTWIDTH] IN BLOOD BY AUTOMATED COUNT: 13 % (ref 11.6–15.1)
ERYTHROCYTE [SEDIMENTATION RATE] IN BLOOD: 11 MM/HOUR (ref 0–29)
EST. AVERAGE GLUCOSE BLD GHB EST-MCNC: 111 MG/DL
FOLATE SERPL-MCNC: >20 NG/ML (ref 3.1–17.5)
GFR SERPL CREATININE-BSD FRML MDRD: 87 ML/MIN/1.73SQ M
GLUCOSE P FAST SERPL-MCNC: 102 MG/DL (ref 65–99)
GLUCOSE UR STRIP-MCNC: NEGATIVE MG/DL
HBA1C MFR BLD: 5.5 %
HCT VFR BLD AUTO: 42.6 % (ref 34.8–46.1)
HCV AB SER QL: NORMAL
HGB BLD-MCNC: 14.1 G/DL (ref 11.5–15.4)
HGB UR QL STRIP.AUTO: NEGATIVE
IMM GRANULOCYTES # BLD AUTO: 0.02 THOUSAND/UL (ref 0–0.2)
IMM GRANULOCYTES NFR BLD AUTO: 1 % (ref 0–2)
IRON SERPL-MCNC: 133 UG/DL (ref 50–170)
KETONES UR STRIP-MCNC: NEGATIVE MG/DL
LEUKOCYTE ESTERASE UR QL STRIP: NEGATIVE
LYMPHOCYTES # BLD AUTO: 1.02 THOUSANDS/ΜL (ref 0.6–4.47)
LYMPHOCYTES NFR BLD AUTO: 25 % (ref 14–44)
MCH RBC QN AUTO: 31.3 PG (ref 26.8–34.3)
MCHC RBC AUTO-ENTMCNC: 33.1 G/DL (ref 31.4–37.4)
MCV RBC AUTO: 95 FL (ref 82–98)
MONOCYTES # BLD AUTO: 0.37 THOUSAND/ΜL (ref 0.17–1.22)
MONOCYTES NFR BLD AUTO: 9 % (ref 4–12)
NEUTROPHILS # BLD AUTO: 2.54 THOUSANDS/ΜL (ref 1.85–7.62)
NEUTS SEG NFR BLD AUTO: 61 % (ref 43–75)
NITRITE UR QL STRIP: NEGATIVE
NRBC BLD AUTO-RTO: 0 /100 WBCS
PH UR STRIP.AUTO: 5.5 [PH]
PLATELET # BLD AUTO: 232 THOUSANDS/UL (ref 149–390)
PMV BLD AUTO: 10.9 FL (ref 8.9–12.7)
POTASSIUM SERPL-SCNC: 4.4 MMOL/L (ref 3.5–5.3)
PROT SERPL-MCNC: 7.6 G/DL (ref 6.4–8.2)
PROT UR STRIP-MCNC: NEGATIVE MG/DL
RBC # BLD AUTO: 4.51 MILLION/UL (ref 3.81–5.12)
SODIUM SERPL-SCNC: 140 MMOL/L (ref 136–145)
SP GR UR STRIP.AUTO: 1.03 (ref 1–1.03)
TSH SERPL DL<=0.05 MIU/L-ACNC: 3.47 UIU/ML (ref 0.36–3.74)
UROBILINOGEN UR QL STRIP.AUTO: 0.2 E.U./DL
VIT B12 SERPL-MCNC: 448 PG/ML (ref 100–900)
WBC # BLD AUTO: 4.09 THOUSAND/UL (ref 4.31–10.16)

## 2021-08-20 PROCEDURE — 86803 HEPATITIS C AB TEST: CPT

## 2021-08-20 PROCEDURE — 1170F FXNL STATUS ASSESSED: CPT | Performed by: INTERNAL MEDICINE

## 2021-08-20 PROCEDURE — 36415 COLL VENOUS BLD VENIPUNCTURE: CPT

## 2021-08-20 PROCEDURE — 3725F SCREEN DEPRESSION PERFORMED: CPT | Performed by: INTERNAL MEDICINE

## 2021-08-20 PROCEDURE — 86431 RHEUMATOID FACTOR QUANT: CPT

## 2021-08-20 PROCEDURE — 86200 CCP ANTIBODY: CPT

## 2021-08-20 PROCEDURE — 1125F AMNT PAIN NOTED PAIN PRSNT: CPT | Performed by: INTERNAL MEDICINE

## 2021-08-20 PROCEDURE — 85652 RBC SED RATE AUTOMATED: CPT

## 2021-08-20 PROCEDURE — 1036F TOBACCO NON-USER: CPT | Performed by: INTERNAL MEDICINE

## 2021-08-20 PROCEDURE — 86038 ANTINUCLEAR ANTIBODIES: CPT

## 2021-08-20 PROCEDURE — 82607 VITAMIN B-12: CPT

## 2021-08-20 PROCEDURE — 1160F RVW MEDS BY RX/DR IN RCRD: CPT | Performed by: INTERNAL MEDICINE

## 2021-08-20 PROCEDURE — 81003 URINALYSIS AUTO W/O SCOPE: CPT | Performed by: INTERNAL MEDICINE

## 2021-08-20 PROCEDURE — 3288F FALL RISK ASSESSMENT DOCD: CPT | Performed by: INTERNAL MEDICINE

## 2021-08-20 PROCEDURE — 83036 HEMOGLOBIN GLYCOSYLATED A1C: CPT

## 2021-08-20 PROCEDURE — 86430 RHEUMATOID FACTOR TEST QUAL: CPT

## 2021-08-20 PROCEDURE — 84443 ASSAY THYROID STIM HORMONE: CPT

## 2021-08-20 PROCEDURE — 82746 ASSAY OF FOLIC ACID SERUM: CPT

## 2021-08-20 PROCEDURE — 99214 OFFICE O/P EST MOD 30 MIN: CPT | Performed by: INTERNAL MEDICINE

## 2021-08-20 PROCEDURE — 80053 COMPREHEN METABOLIC PANEL: CPT

## 2021-08-20 PROCEDURE — 83540 ASSAY OF IRON: CPT

## 2021-08-20 PROCEDURE — 85025 COMPLETE CBC W/AUTO DIFF WBC: CPT

## 2021-08-20 PROCEDURE — G0439 PPPS, SUBSEQ VISIT: HCPCS | Performed by: INTERNAL MEDICINE

## 2021-08-20 NOTE — PROGRESS NOTES
Assessment and Plan:     Problem List Items Addressed This Visit     None          Falls Plan of Care: balance, strength, and gait training instructions were provided  Preventive health issues were discussed with patient, and age appropriate screening tests were ordered as noted in patient's After Visit Summary  Personalized health advice and appropriate referrals for health education or preventive services given if needed, as noted in patient's After Visit Summary       History of Present Illness:     Patient presents for Medicare Annual Wellness visit    Patient Care Team:  Cristofer Schneider MD as PCP - Cait Vogt MD as PCP - [address] [place] [address] (RTE)  [facility]     Problem List:     Patient Active Problem List   Diagnosis    History of endometrial cancer    Age-related osteoporosis without current pathological fracture    Benign colon polyp    Arthritis    Breast screening    Encounter for immunization    Asthma    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    Health care maintenance    Encounter for follow-up surveillance of endometrial cancer    Rib pain on left side    Frequent falls    Seborrhea    Ataxia    Right hand pain    Malignant neoplasm of endometrium [facility])      Past Medical and Surgical History:     Past Medical History:   Diagnosis Date    Arthritis     Asthma     Benign colon polyp     Endometrial cancer ([address] ) 2015    Esophagitis, reflux     Hemorrhoids, internal     Sebaceous gland disease      Past Surgical History:   Procedure Laterality Date    APPENDECTOMY      CATARACT EXTRACTION Right 2021    HAND FRACTURE REPAIR      Open Treatment of Fracture of one Metacarpal Bone    HYSTERECTOMY      KNEE ARTHROSCOPY      Therapeutic    LYMPH NODE BIOPSY      LYMPHADENECTOMY      staging    LYMPHADENECTOMY      Staging    OOPHORECTOMY      PELVIC LAPAROSCOPY      WA TOTAL KNEE ARTHROPLASTY Left 2/18/2019    Procedure: ARTHROPLASTY KNEE TOTAL;  Surgeon: Zoë Horan MD;  Location: [facility];  Service: Orthopedics    ROBOTIC ASSISTED HYSTERECTOMY      SALPINGOOPHORECTOMY Bilateral     TUBAL LIGATION      WRIST SURGERY      open treatment of fracture of one metacarpal bone      Family History:     Family History   Problem Relation Age of Onset    Cancer Mother         unknown    Breast cancer Mother 28    Cancer Father     Stomach cancer Father     No Known Problems Daughter     No Known Problems Maternal Grandmother     No Known Problems Paternal Grandmother     No Known Problems Maternal Aunt     No Known Problems Maternal Aunt     No Known Problems Maternal Aunt     No Known Problems Paternal Aunt     No Known Problems Paternal Aunt       Social History:     Social History     Socioeconomic History    Marital status: /Civil Union     Spouse name: None    Number of children: None    Years of education: None    Highest education level: None   Occupational History    None   Tobacco Use    Smoking status: Never Smoker    Smokeless tobacco: Never Used   Vaping Use    Vaping Use: Never used   Substance and Sexual Activity    Alcohol use: Not Currently    Drug use: No    Sexual activity: Not Currently     Partners: Male   Other Topics Concern    None   Social History Narrative    Housewife or homemaker    Lives with relatives     Live with spouse    Travel to [facility]    Travel to [facility]     Social Determinants of Health     Financial Resource Strain:     Difficulty of Paying Living Expenses:    Food Insecurity:     Worried About Running Out of Food in the Last Year:     Ran Out of Food in the Last Year:    Transportation Needs:     Lack of Transportation (Medical):      Lack of Transportation (Non-Medical):    Physical Activity: Sufficiently Active    Days of Exercise per Week: 5 days    Minutes of Exercise per Session: 90 min   Stress: Stress Concern Present    Feeling of Stress : To some extent   Social Connections:     Frequency of Communication with Friends and Family:     Frequency of Social Gatherings with Friends and Family:     Attends Adventism Services:     Active Member of Clubs or Organizations:     Attends Club or Organization Meetings:     Marital Status:    Intimate Partner Violence:     Fear of Current or Ex-Partner:     Emotionally Abused:     Physically Abused:     Sexually Abused:       Medications and Allergies:     Current Outpatient Medications   Medication Sig Dispense Refill    aspirin 81 mg chewable tablet Chew 81 mg daily      calcium carbonate-vitamin D (OSCAL-D) 500 mg-200 units per tablet Take 1 tablet by mouth 2 (two) times a day with meals      Cyanocobalamin (VITAMIN B-12) 1000 MCG/15ML LIQD Take 4 tablets by mouth daily      dorzolamide-timolol (COSOPT) 22 3-6 8 MG/ML ophthalmic solution       latanoprost (XALATAN) 0 005 % ophthalmic solution Apply 1 drop to eye      Multiple Vitamins-Minerals (MULTI-B-PLUS) TABS Take by mouth      TURMERIC PO Take 500 Units by mouth daily      cyclobenzaprine (FLEXERIL) 10 mg tablet Take 1 tablet (10 mg total) by mouth 3 (three) times a day as needed for muscle spasms 30 tablet 2    ergocalciferol (VITAMIN D2) 50,000 units TAKE 1 CAPSULE BY MOUTH ONE TIME PER WEEK 12 capsule 0    ibuprofen (MOTRIN) 600 mg tablet Take 1 tablet (600 mg total) by mouth every 8 (eight) hours as needed for mild pain or moderate pain (Patient not taking: Reported on 1/18/2021) 90 tablet 0    neomycin-polymyxin-hydrocortisone (CORTISPORIN) otic solution Administer 4 drops to the right ear every 8 (eight) hours 20 mL 3    tacrolimus (PROTOPIC) 0 1 % ointment  (Patient not taking: Reported on 8/20/2021)       No current facility-administered medications for this visit       No Known Allergies   Immunizations:     Immunization History   Administered Date(s) Administered    INFLUENZA 12/01/2014, 01/05/2016, 01/04/2017, 01/08/2018    Influenza Split High Dose Preservative Free IM 12/01/2014, 01/05/2016, 01/04/2017, 01/08/2018    Influenza, high dose seasonal 0 7 mL 10/23/2018, 11/06/2019, 11/04/2020    Influenza, seasonal, injectable 12/12/2013    Pneumococcal Conjugate 13-Valent 01/05/2016, 02/10/2017    Pneumococcal Polysaccharide PPV23 10/23/2018    SARS-CoV-2 / COVID-19 mRNA IM (Pfizer-BioNTech) 02/22/2021, 03/15/2021    Tdap 12/01/2014    Varicella 02/03/2016      Health Maintenance:         Topic Date Due    Hepatitis C Screening  Never done    Breast Cancer Screening: Mammogram  08/07/2021    DXA SCAN  11/05/2022         Topic Date Due    Influenza Vaccine (1) 09/01/2021      Medicare Health Risk Assessment:     There were no vitals taken for this visit  Dakotah Ahumada is here for her Subsequent Wellness visit  Last Medicare Wellness visit information reviewed, patient interviewed and updates made to the record today  Health Risk Assessment:   Patient rates overall health as very good  Patient feels that their physical health rating is slightly better  Patient is satisfied with their life  Eyesight was rated as same  Hearing was rated as same  Patient feels that their emotional and mental health rating is slightly better  Patients states they are sometimes angry  Patient states they are sometimes unusually tired/fatigued  Pain experienced in the last 7 days has been none  Patient states that she has experienced no weight loss or gain in last 6 months  Depression Screening:   PHQ-2 Score: 2      Fall Risk Screening: In the past year, patient has experienced: history of falling in past year    Number of falls: 2 or more  Injured during fall?: No    Feels unsteady when standing or walking?: Yes    Worried about falling?: Yes      Urinary Incontinence Screening:   Patient has leaked urine accidently in the last six months       Home Safety:  Patient does not have trouble with stairs inside or outside of their home  Patient has working smoke alarms and has working carbon monoxide detector  Home safety hazards include: none  Does not have trouble with stairs but is difficult  Nutrition:   Current diet is Regular and Limited junk food  Medications:   Patient is currently taking over-the-counter supplements  OTC medications include: see medication list  Patient is able to manage medications  Activities of Daily Living (ADLs)/Instrumental Activities of Daily Living (IADLs):   Walk and transfer into and out of bed and chair?: Yes  Dress and groom yourself?: Yes    Bathe or shower yourself?: Yes    Feed yourself? Yes  Do your laundry/housekeeping?: Yes  Manage your money, pay your bills and track your expenses?: Yes  Make your own meals?: Yes    Do your own shopping?: Yes    Previous Hospitalizations:   Any hospitalizations or ED visits within the last 12 months?: No      Advance Care Planning:   Living will: Yes    Advanced directive: Yes      Cognitive Screening:   Provider or family/friend/caregiver concerned regarding cognition?: No    PREVENTIVE SCREENINGS      Cardiovascular Screening:    General: Screening Current      Breast Cancer Screening:     General: Screening Current      Cervical Cancer Screening:    General: Screening Not Indicated      Osteoporosis Screening:    General: Screening Not Indicated and History Osteoporosis      Abdominal Aortic Aneurysm (AAA) Screening:        General: Screening Not Indicated      Lung Cancer Screening:     General: Screening Not Indicated      Hepatitis C Screening:      Hep C Screening Accepted: Yes      Screening, Brief Intervention, and Referral to Treatment (SBIRT)    Screening  Typical number of drinks in a day: 0  Typical number of drinks in a week: 0  Interpretation: Low risk drinking behavior      AUDIT-C Screenin) How often did you have a drink containing alcohol in the past year? never  2) How many drinks did you have on a typical day when you were drinking in the past year? 0  3) How often did you have 6 or more drinks on one occasion in the past year? never    AUDIT-C Score: 0  Interpretation: Score 0-2 (female): Negative screen for alcohol misuse    Single Item Drug Screening:  How often have you used an illegal drug (including marijuana) or a prescription medication for non-medical reasons in the past year? never    Single Item Drug Screen Score: 0  Interpretation: Negative screen for possible drug use disorder      Edgardo Villanueva MD

## 2021-08-20 NOTE — PROGRESS NOTES
Assessment/Plan:       Diagnoses and all orders for this visit:    Ataxia  -     CBC and differential; Future  -     Comprehensive metabolic panel; Future  -     Hemoglobin A1C; Future  -     TSH, 3rd generation with Free T4 reflex; Future  -     UA (URINE) with reflex to Scope  -     Hepatitis C antibody; Future  -     Folate; Future  -     Iron; Future  -     Vitamin B12; Future  -     Sedimentation rate, automated; Future  -     SHARON Screen w/ Reflex to Titer/Pattern; Future  -     RF Screen w/ Reflex to Titer; Future  -     Cyclic citrul peptide antibody, IgG; Future  -     Ambulatory Referral to Otolaryngology; Future    Frequent falls  -     Ambulatory Referral to Otolaryngology; Future    History of endometrial cancer    Neuropathy  -     CBC and differential; Future  -     Comprehensive metabolic panel; Future  -     Hemoglobin A1C; Future  -     TSH, 3rd generation with Free T4 reflex; Future  -     UA (URINE) with reflex to Scope  -     Hepatitis C antibody; Future  -     Folate; Future  -     Iron; Future  -     Vitamin B12; Future  -     Sedimentation rate, automated; Future  -     SHARON Screen w/ Reflex to Titer/Pattern; Future  -     RF Screen w/ Reflex to Titer; Future  -     Cyclic citrul peptide antibody, IgG; Future    Vitamin D deficiency  -     CBC and differential; Future  -     Comprehensive metabolic panel; Future  -     Hemoglobin A1C; Future  -     TSH, 3rd generation with Free T4 reflex; Future  -     UA (URINE) with reflex to Scope  -     Hepatitis C antibody; Future  -     Folate; Future  -     Iron; Future  -     Vitamin B12; Future  -     Sedimentation rate, automated; Future  -     SHARON Screen w/ Reflex to Titer/Pattern; Future  -     RF Screen w/ Reflex to Titer; Future  -     Cyclic citrul peptide antibody, IgG; Future                Subjective:      Patient ID: Genevieve Boudreaux is a 68 y o  female  A 74-year-old female   Dizziness, falls, and gait ataxia    This began in June of this year rather abruptly and has been present ever since  She has fallen 3 times in the past week  She feels off balance  She has been using a cane  she had reported this in February 2020  The history and physical were nondiagnostic and brain MRI was normal   Apparently, this situation seemed to improve itself but as noted above for the past several months it has been daily and constant  She takes no medications that conceivably could worsen this  No alcohol and no drugs  She also reports a sensation of numbness of the bottoms of the feet , but that is not new  Again in early 2020 the thought was this might be related to chemotherapy for the endometrial cancer but she has not been on chemo for a couple of years    Endometrial cancer [facility] at [facility]  Associated rhinitis  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 extending from calcaneus to the midfoot  History of benign colon polyp  Colonoscopy done earlier in 2018  Mammogram done  on a regular basis has been normal  Lichen simplex chronicus of the feet is treated by Dermatology with variable results  age-related osteoporosis of been reported  Taking vitamin-D long-term  This needs a recheck      left total knee replacement done early 2019 with excellent outcome   Right total knee replacement done about 6 months ago but the patient still has some residual pain       She developed visual defect of the right eye  She went to an ophthalmologist who is diagnosis is "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 "  Apparently, the suggestion is that this is due to either hyperlipidemia or hypertension  I spoke with the ophthalmologist and explain that she is not hypertensive nor hyperlipidemic  I reviewed the numbers with him  He agreed that there was no indication to treat with antihypertensive or antihyperlipidemic drugs    And he suggested that the only treatment for this situation would be addition of aspirin 81 mg daily which she had already suggested to the patient  At a distance of about 2 years this has now improved back to a baseline state               The following portions of the patient's history were reviewed and updated as appropriate:   She has a past medical history of Arthritis, Asthma, Benign colon polyp, Endometrial cancer ([facility] [address] ) (2015), Esophagitis, reflux, Hemorrhoids, internal, and Sebaceous gland disease  ,  does not have any pertinent problems on file  ,   has a past surgical history that includes Appendectomy; Oophorectomy; Lymph node biopsy; Hysterectomy; Laparoscopy; Knee arthroscopy; Robotic assisted hysterectomy; Wrist surgery; Salpingoophorectomy (Bilateral); Lymphadenectomy; Tubal ligation; HAND FRACTURE REPAIR; Lymphadenectomy; pr total knee arthroplasty (Left, 2/18/2019); and Cataract extraction (Right, 2021)  ,  family history includes Breast cancer (age of onset: 28) in her mother; Cancer in her father and mother; No Known Problems in her daughter, maternal aunt, maternal aunt, maternal aunt, maternal grandmother, paternal aunt, paternal aunt, and paternal grandmother; Stomach cancer in her father  ,   reports that she has never smoked  She has never used smokeless tobacco  She reports previous alcohol use  She reports that she does not use drugs  ,  has No Known Allergies     Current Outpatient Medications   Medication Sig Dispense Refill    aspirin 81 mg chewable tablet Chew 81 mg daily      calcium carbonate-vitamin D (OSCAL-D) 500 mg-200 units per tablet Take 1 tablet by mouth 2 (two) times a day with meals      Cyanocobalamin (VITAMIN B-12) 1000 MCG/15ML LIQD Take 4 tablets by mouth daily      dorzolamide-timolol (COSOPT) 22 3-6 8 MG/ML ophthalmic solution       latanoprost (XALATAN) 0 005 % ophthalmic solution Apply 1 drop to eye      Multiple Vitamins-Minerals (MULTI-B-PLUS) TABS Take by mouth      TURMERIC PO Take 500 Units by mouth daily      tacrolimus (PROTOPIC) 0 1 % ointment  (Patient not taking: Reported on 8/20/2021)       No current facility-administered medications for this visit  Review of Systems   Musculoskeletal: Positive for arthralgias  Neurological: Positive for dizziness, light-headedness and numbness  Frequent fall   All other systems reviewed and are negative  Objective:  Vitals:    08/20/21 1008   BP: 116/72   Pulse: 60   Temp: 97 7 °F (36 5 °C)   SpO2: 96%      Physical Exam  Constitutional:       Appearance: She is well-developed  Comments: A female patient who appears to be the stated age, overweight  HENT:      Head: Normocephalic and atraumatic  Eyes:      Pupils: Pupils are equal, round, and reactive to light  Neck:      Thyroid: No thyromegaly  Trachea: No tracheal deviation  Cardiovascular:      Rate and Rhythm: Normal rate and regular rhythm  Heart sounds: Normal heart sounds  No murmur heard  No gallop  Pulmonary:      Effort: No respiratory distress  Breath sounds: No wheezing or rales  Abdominal:      General: Bowel sounds are normal       Palpations: Abdomen is soft  Tenderness: There is no abdominal tenderness  Musculoskeletal:         General: No tenderness or deformity  Normal range of motion  Cervical back: Normal range of motion and neck supple  Skin:     General: Skin is warm  Neurological:      Mental Status: She is alert and oriented to person, place, and time  Coordination: Coordination normal    Psychiatric:         Judgment: Judgment normal            Patient Instructions     A patient with gait ataxia which has had a worsening  She also reports neuropathy with paresthesia and numbness of the bottom of the feet  Will do a metabolic workup for this   Referred to Neurology; she already has an appointment set up for about 6 weeks from now       referral to ENT

## 2021-08-20 NOTE — PATIENT INSTRUCTIONS
A patient with gait ataxia which has had a worsening  She also reports neuropathy with paresthesia and numbness of the bottom of the feet  Will do a metabolic workup for this   Referred to Neurology; she already has an appointment set up for about 6 weeks from now       referral to ENT

## 2021-08-22 LAB — RYE IGE QN: NEGATIVE

## 2021-08-23 LAB
CRYOGLOB RF SER-ACNC: ABNORMAL [IU]/ML
RHEUMATOID FACT SER QL LA: POSITIVE

## 2021-08-24 LAB — CCP AB SER IA-ACNC: 1.8

## 2021-10-11 ENCOUNTER — HOSPITAL ENCOUNTER (OUTPATIENT)
Dept: MAMMOGRAPHY | Facility: CLINIC | Age: 81
Discharge: HOME | End: 2021-10-11
Payer: COMMERCIAL

## 2021-10-11 VITALS — BODY MASS INDEX: 34.04 KG/M2 | WEIGHT: 185 LBS | HEIGHT: 62 IN

## 2021-10-11 DIAGNOSIS — Z12.31 ENCOUNTER FOR SCREENING MAMMOGRAM FOR BREAST CANCER: ICD-10-CM

## 2021-10-11 PROCEDURE — 77067 SCR MAMMO BI INCL CAD: CPT

## 2021-10-11 PROCEDURE — 77063 BREAST TOMOSYNTHESIS BI: CPT

## 2021-10-22 ENCOUNTER — CLINICAL SUPPORT (OUTPATIENT)
Dept: INTERNAL MEDICINE CLINIC | Facility: CLINIC | Age: 81
End: 2021-10-22
Payer: COMMERCIAL

## 2021-10-22 DIAGNOSIS — Z23 ENCOUNTER FOR IMMUNIZATION: Primary | ICD-10-CM

## 2021-10-22 PROCEDURE — 90662 IIV NO PRSV INCREASED AG IM: CPT

## 2021-10-22 PROCEDURE — G0008 ADMIN INFLUENZA VIRUS VAC: HCPCS

## 2021-12-07 ENCOUNTER — OFFICE VISIT (OUTPATIENT)
Dept: GYNECOLOGIC ONCOLOGY | Facility: CLINIC | Age: 81
End: 2021-12-07
Payer: COMMERCIAL

## 2021-12-07 VITALS
RESPIRATION RATE: 18 BRPM | OXYGEN SATURATION: 98 % | WEIGHT: 184 LBS | SYSTOLIC BLOOD PRESSURE: 116 MMHG | BODY MASS INDEX: 33.86 KG/M2 | HEIGHT: 62 IN | TEMPERATURE: 97.8 F | DIASTOLIC BLOOD PRESSURE: 72 MMHG | HEART RATE: 67 BPM

## 2021-12-07 DIAGNOSIS — Z85.42 ENCOUNTER FOR FOLLOW-UP SURVEILLANCE OF ENDOMETRIAL CANCER: ICD-10-CM

## 2021-12-07 DIAGNOSIS — N39.46 MIXED STRESS AND URGE URINARY INCONTINENCE: ICD-10-CM

## 2021-12-07 DIAGNOSIS — Z08 ENCOUNTER FOR FOLLOW-UP SURVEILLANCE OF ENDOMETRIAL CANCER: ICD-10-CM

## 2021-12-07 DIAGNOSIS — Z85.42 HISTORY OF ENDOMETRIAL CANCER: Primary | ICD-10-CM

## 2021-12-07 PROCEDURE — 99213 OFFICE O/P EST LOW 20 MIN: CPT | Performed by: PHYSICIAN ASSISTANT

## 2021-12-07 PROCEDURE — 1036F TOBACCO NON-USER: CPT | Performed by: PHYSICIAN ASSISTANT

## 2021-12-07 PROCEDURE — 1160F RVW MEDS BY RX/DR IN RCRD: CPT | Performed by: PHYSICIAN ASSISTANT

## 2021-12-23 ENCOUNTER — TELEPHONE (OUTPATIENT)
Dept: INTERNAL MEDICINE CLINIC | Facility: CLINIC | Age: 81
End: 2021-12-23

## 2021-12-23 DIAGNOSIS — Z11.52 ENCOUNTER FOR SCREENING FOR COVID-19: Primary | ICD-10-CM

## 2021-12-23 PROCEDURE — U0003 INFECTIOUS AGENT DETECTION BY NUCLEIC ACID (DNA OR RNA); SEVERE ACUTE RESPIRATORY SYNDROME CORONAVIRUS 2 (SARS-COV-2) (CORONAVIRUS DISEASE [COVID-19]), AMPLIFIED PROBE TECHNIQUE, MAKING USE OF HIGH THROUGHPUT TECHNOLOGIES AS DESCRIBED BY CMS-2020-01-R: HCPCS | Performed by: INTERNAL MEDICINE

## 2021-12-23 PROCEDURE — U0005 INFEC AGEN DETEC AMPLI PROBE: HCPCS | Performed by: INTERNAL MEDICINE

## 2021-12-24 LAB — SARS-COV-2 RNA RESP QL NAA+PROBE: NEGATIVE

## 2022-03-01 ENCOUNTER — TELEPHONE (OUTPATIENT)
Dept: NEUROLOGY | Facility: CLINIC | Age: 82
End: 2022-03-01

## 2022-03-04 ENCOUNTER — OFFICE VISIT (OUTPATIENT)
Dept: NEUROLOGY | Facility: CLINIC | Age: 82
End: 2022-03-04
Payer: COMMERCIAL

## 2022-03-04 VITALS
SYSTOLIC BLOOD PRESSURE: 122 MMHG | OXYGEN SATURATION: 97 % | HEIGHT: 62 IN | TEMPERATURE: 97.2 F | WEIGHT: 195 LBS | BODY MASS INDEX: 35.88 KG/M2 | DIASTOLIC BLOOD PRESSURE: 78 MMHG | HEART RATE: 80 BPM

## 2022-03-04 DIAGNOSIS — G89.29 CHRONIC PAIN OF RIGHT KNEE: ICD-10-CM

## 2022-03-04 DIAGNOSIS — G62.0 DRUG-INDUCED POLYNEUROPATHY (HCC): Primary | ICD-10-CM

## 2022-03-04 DIAGNOSIS — M25.561 CHRONIC PAIN OF RIGHT KNEE: ICD-10-CM

## 2022-03-04 DIAGNOSIS — R27.8 SENSORY ATAXIA: ICD-10-CM

## 2022-03-04 PROCEDURE — 99204 OFFICE O/P NEW MOD 45 MIN: CPT | Performed by: PSYCHIATRY & NEUROLOGY

## 2022-03-04 PROCEDURE — 1160F RVW MEDS BY RX/DR IN RCRD: CPT | Performed by: PSYCHIATRY & NEUROLOGY

## 2022-03-04 PROCEDURE — 1036F TOBACCO NON-USER: CPT | Performed by: PSYCHIATRY & NEUROLOGY

## 2022-03-04 RX ORDER — GABAPENTIN 100 MG/1
CAPSULE ORAL
Qty: 100 CAPSULE | Refills: 5 | Status: SHIPPED | OUTPATIENT
Start: 2022-03-04

## 2022-03-04 NOTE — LETTER
March 4, 2022     Olivia Bradley MD  [address] [facility] [address]    Patient: Lincoln Kahn   YOB: 1940   Date of Visit: 3/4/2022       Dear Dr Serrato Screws: Thank you for referring Lincoln Kahn to me for evaluation  Below are my notes for this consultation  If you have questions, please do not hesitate to call me  I look forward to following your patient along with you  Sincerely,        Evy Adams MD        CC: No Recipients  Evy Adams MD  3/4/2022  1:42 PM  Sign when Signing Visit  Lincoln Kahn is a 80 y o  female presents with symptoms of numbness in her feet, balance difficulty and knee pain    Assessment:  1  Drug-induced polyneuropathy ([address] )    2  Sensory ataxia    3  Chronic pain of right knee        Plan:  Trial of gabapentin 100 milligrams t i d  Follow-up 2 months    Discussion:  Rush Matamoros reports that when she made this appointment several months ago she was having pain radiating from her right heel up her leg into her groin  She states that several months ago these symptoms resolved and have not recurred  She does report numbness and tingling in her feet which I suspect is secondary to chemotherapy that she had for her endometrial carcinoma as the symptoms began after that and have not improved  She is having some dysesthesias interrupting sleep and have recommended a trial of gabapentin starting with 100 milligrams  She may titrate up to 300 milligrams 3 times daily  We discussed potential adverse effects of the medication if she has problems she will notify me  As result of her neuropathy she does have some issues with her balance but states that she is doing much better since she has been using a cane  Discussed ways of minimizing fall risk  She reports chronic right knee pain since knee replacement surgery a few years ago with tenderness to palpation of the knee  Suspect primary musculoskeletal etiology    She has no findings today suggestive of radiculopathy  She may try melatonin to help with sleep as well  I will see her back in follow-up in few months      Subjective:    HPI  Zulema Knight is a right-handed woman who presents with the above complaints  She states when the point was initially made several months ago she was having quite a bit of pain that began in her right heel and radiated up her leg into her groin area  She states that a few months ago these pain symptoms resolved and have not recurred  She does have chronic pain in her right knee since she had knee replacement surgery a few years ago associated with tenderness to the knee  She reports that she has numbness and tingling in her feet especially on the left side  She states the symptoms began following treatment for endometrial carcinoma a few years ago which included chemotherapy  She was told at the time the symptoms may or may not improve  She finds that she will occasionally awaken at night with dysesthesias in the foot and she needs the rub the foot to try to go back to sleep  Sometimes she has a difficult time falling back to sleep  He denies any burning or electric shock type pains but more of a foreign body sensation between her toes  She also reports that she was having problems with her balance  She was having a fall a couple of times a week  Since she has been using her cane she is doing much better        Past Medical History:   Diagnosis Date    Arthritis     Asthma     Benign colon polyp     [facility]    Esophagitis, reflux     Hemorrhoids, internal     Sebaceous gland disease        Family History:  Family History   Problem Relation Age of Onset    Cancer Mother         unknown    Breast cancer Mother 28    Cancer Father     Stomach cancer Father     No Known Problems Daughter     No Known Problems Maternal Grandmother     No Known Problems Paternal Grandmother     No Known Problems Maternal Aunt     No Known Problems Maternal Aunt     No Known Problems Maternal Aunt     No Known Problems Paternal Aunt     No Known Problems Paternal Aunt        Past Surgical History:  Past Surgical History:   Procedure Laterality Date    APPENDECTOMY      CATARACT EXTRACTION Right 2021    HAND FRACTURE REPAIR      Open Treatment of Fracture of one Metacarpal Bone    HYSTERECTOMY      KNEE ARTHROSCOPY      Therapeutic    LYMPH NODE BIOPSY      LYMPHADENECTOMY      staging    LYMPHADENECTOMY      Staging    OOPHORECTOMY      PELVIC LAPAROSCOPY      [place] TOTAL KNEE ARTHROPLASTY Left 2/18/2019    Procedure: ARTHROPLASTY KNEE TOTAL;  Surgeon: Geetha Richard MD;  Location: [facility];  Service: Orthopedics    ROBOTIC ASSISTED HYSTERECTOMY      SALPINGOOPHORECTOMY Bilateral     TUBAL LIGATION      WRIST SURGERY      open treatment of fracture of one metacarpal bone       Social History:   reports that she has never smoked  She has never used smokeless tobacco  She reports previous alcohol use  She reports that she does not use drugs  Allergies:  Patient has no known allergies        Current Outpatient Medications:     calcium carbonate-vitamin D (OSCAL-D) 500 mg-200 units per tablet, Take 1 tablet by mouth 2 (two) times a day with meals  , Disp: , Rfl:     Cyanocobalamin (VITAMIN B-12) 1000 MCG/15ML LIQD, Take 4 tablets by mouth daily  , Disp: , Rfl:     dorzolamide-timolol (COSOPT) 22 3-6 8 MG/ML ophthalmic solution, , Disp: , Rfl:     latanoprost (XALATAN) 0 005 % ophthalmic solution, Apply 1 drop to eye, Disp: , Rfl:     Multiple Vitamins-Minerals (MULTI-B-PLUS) TABS, Take by mouth, Disp: , Rfl:     tacrolimus (PROTOPIC) 0 1 % ointment,  , Disp: , Rfl:     aspirin 81 mg chewable tablet, Chew 81 mg daily (Patient not taking: Reported on 12/7/2021 ), Disp: , Rfl:     gabapentin (NEURONTIN) 100 mg capsule, 1-3 pills 3 times daily as directed, Disp: 100 capsule, Rfl: 5    TURMERIC PO, Take 500 Units by mouth daily (Patient not taking: Reported on 3/4/2022 ), Disp: , Rfl:     I have reviewed the past medical, social and family history, current medications, allergies, vitals, review of systems and updated this information as appropriate today     Objective:    Vitals:  Blood pressure 122/78, pulse 80, temperature (!) 97 2 °F (36 2 °C), temperature source Temporal, height 5' 2" (1 575 m), weight 88 5 kg (195 lb), SpO2 97 %  Physical Exam    Neurological Exam    GENERAL:  Cooperative in no acute distress  Well-developed and well-nourished    HEAD and NECK   Head is atraumatic normocephalic with no lesions or masses  Neck is supple with full range of motion    CARDIOVASCULAR  Carotid Arteries-no carotid bruits  NEUROLOGIC:  Mental Status-the patient is awake alert and oriented without aphasia or apraxia  Cranial Nerves: Visual fields are full to confrontation    Extraocular movements are full without nystagmus  Pupils are 2-1/2 mm and reactive  Face is symmetrical to light touch  Movements of facial expression move symmetrically  Hearing is normal to finger rub bilaterally  Soft palate lifts symmetrically  Shoulder shrug is symmetrical  Tongue is midline without atrophy  Motor: No drift is noted on arm extension  Strength is full in the upper and lower extremities with normal bulk and tone  Sensory:  Diminished temperature and vibratory sensation in the distal lower extremities bilaterally  Cortical function is intact  Coordination: Finger to nose testing is performed accurately  Romberg is remarkable for increased sway with eyes closed  Gait reveals a normal base with subtle decreased left arm swing  Tandem walk is performed with frequently stepping off  Reflexes:  1/4 in the biceps, triceps and brachioradialis regions, trace at the knees and absent at the ankles  Toes are downgoing   MUSCULOSKELETAL:  Back is straight and nontender  Straight leg raising is remarkable for hamstring tightness bilaterally    No pain is noted with internal external rotation at the hips  She is able to flex to touch to the ankles bilaterally            ROS:    Review of Systems   Constitutional: Negative  Negative for appetite change and fever  HENT: Negative  Negative for hearing loss, tinnitus, trouble swallowing and voice change  Eyes: Negative  Negative for photophobia and pain  Respiratory: Negative  Negative for shortness of breath  Cardiovascular: Negative  Negative for palpitations  Gastrointestinal: Negative  Negative for nausea and vomiting  Endocrine: Negative  Negative for cold intolerance  Genitourinary: Negative  Negative for dysuria, frequency and urgency  Musculoskeletal: Positive for gait problem  Negative for myalgias and neck pain  Skin: Negative  Negative for rash  Neurological: Positive for dizziness, light-headedness and numbness  Negative for tremors, seizures, syncope, facial asymmetry, speech difficulty, weakness and headaches  Patient states numbness in feet  Hematological: Negative  Does not bruise/bleed easily  Psychiatric/Behavioral: Positive for sleep disturbance  Negative for confusion and hallucinations

## 2022-03-04 NOTE — PROGRESS NOTES
Cuong Roberson is a 80 y o  female presents with symptoms of numbness in her feet, balance difficulty and knee pain    Assessment:  1  Drug-induced polyneuropathy ([address] )    2  Sensory ataxia    3  Chronic pain of right knee        Plan:  Trial of gabapentin 100 milligrams t i d  Follow-up 2 months    Discussion:  Robb Alvarado reports that when she made this appointment several months ago she was having pain radiating from her right heel up her leg into her groin  She states that several months ago these symptoms resolved and have not recurred  She does report numbness and tingling in her feet which I suspect is secondary to chemotherapy that she had for her endometrial carcinoma as the symptoms began after that and have not improved  She is having some dysesthesias interrupting sleep and have recommended a trial of gabapentin starting with 100 milligrams  She may titrate up to 300 milligrams 3 times daily  We discussed potential adverse effects of the medication if she has problems she will notify me  As result of her neuropathy she does have some issues with her balance but states that she is doing much better since she has been using a cane  Discussed ways of minimizing fall risk  She reports chronic right knee pain since knee replacement surgery a few years ago with tenderness to palpation of the knee  Suspect primary musculoskeletal etiology  She has no findings today suggestive of radiculopathy  She may try melatonin to help with sleep as well  I will see her back in follow-up in few months      Subjective:    HPI  Robb Alvarado is a right-handed woman who presents with the above complaints  She states when the point was initially made several months ago she was having quite a bit of pain that began in her right heel and radiated up her leg into her groin area  She states that a few months ago these pain symptoms resolved and have not recurred    She does have chronic pain in her right knee since she had knee replacement surgery a few years ago associated with tenderness to the knee  She reports that she has numbness and tingling in her feet especially on the left side  She states the symptoms began following treatment for endometrial carcinoma a few years ago which included chemotherapy  She was told at the time the symptoms may or may not improve  She finds that she will occasionally awaken at night with dysesthesias in the foot and she needs the rub the foot to try to go back to sleep  Sometimes she has a difficult time falling back to sleep  He denies any burning or electric shock type pains but more of a foreign body sensation between her toes  She also reports that she was having problems with her balance  She was having a fall a couple of times a week  Since she has been using her cane she is doing much better        Past Medical History:   Diagnosis Date    Arthritis     Asthma     Benign colon polyp     [facility]    Esophagitis, reflux     Hemorrhoids, internal     Sebaceous gland disease        Family History:  Family History   Problem Relation Age of Onset    Cancer Mother         unknown    Breast cancer Mother 28    Cancer Father     Stomach cancer Father     No Known Problems Daughter     No Known Problems Maternal Grandmother     No Known Problems Paternal Grandmother     No Known Problems Maternal Aunt     No Known Problems Maternal Aunt     No Known Problems Maternal Aunt     No Known Problems Paternal Aunt     No Known Problems Paternal Aunt        Past Surgical History:  Past Surgical History:   Procedure Laterality Date    APPENDECTOMY      CATARACT EXTRACTION Right 2021    HAND FRACTURE REPAIR      Open Treatment of Fracture of one Metacarpal Bone    HYSTERECTOMY      KNEE ARTHROSCOPY      Therapeutic    LYMPH NODE BIOPSY      LYMPHADENECTOMY      staging    LYMPHADENECTOMY      Staging    OOPHORECTOMY      PELVIC LAPAROSCOPY      [place] TOTAL KNEE ARTHROPLASTY Left 2/18/2019    Procedure: ARTHROPLASTY KNEE TOTAL;  Surgeon: Reta Dee MD;  Location: [facility];  Service: Orthopedics    ROBOTIC ASSISTED HYSTERECTOMY      SALPINGOOPHORECTOMY Bilateral     TUBAL LIGATION      WRIST SURGERY      open treatment of fracture of one metacarpal bone       Social History:   reports that she has never smoked  She has never used smokeless tobacco  She reports previous alcohol use  She reports that she does not use drugs  Allergies:  Patient has no known allergies  Current Outpatient Medications:     calcium carbonate-vitamin D (OSCAL-D) 500 mg-200 units per tablet, Take 1 tablet by mouth 2 (two) times a day with meals  , Disp: , Rfl:     Cyanocobalamin (VITAMIN B-12) 1000 MCG/15ML LIQD, Take 4 tablets by mouth daily  , Disp: , Rfl:     dorzolamide-timolol (COSOPT) 22 3-6 8 MG/ML ophthalmic solution, , Disp: , Rfl:     latanoprost (XALATAN) 0 005 % ophthalmic solution, Apply 1 drop to eye, Disp: , Rfl:     Multiple Vitamins-Minerals (MULTI-B-PLUS) TABS, Take by mouth, Disp: , Rfl:     tacrolimus (PROTOPIC) 0 1 % ointment,  , Disp: , Rfl:     aspirin 81 mg chewable tablet, Chew 81 mg daily (Patient not taking: Reported on 12/7/2021 ), Disp: , Rfl:     gabapentin (NEURONTIN) 100 mg capsule, 1-3 pills 3 times daily as directed, Disp: 100 capsule, Rfl: 5    TURMERIC PO, Take 500 Units by mouth daily (Patient not taking: Reported on 3/4/2022 ), Disp: , Rfl:     I have reviewed the past medical, social and family history, current medications, allergies, vitals, review of systems and updated this information as appropriate today     Objective:    Vitals:  Blood pressure 122/78, pulse 80, temperature (!) 97 2 °F (36 2 °C), temperature source Temporal, height 5' 2" (1 575 m), weight 88 5 kg (195 lb), SpO2 97 %  Physical Exam    Neurological Exam    GENERAL:  Cooperative in no acute distress   Well-developed and well-nourished    HEAD and NECK Head is atraumatic normocephalic with no lesions or masses  Neck is supple with full range of motion    CARDIOVASCULAR  Carotid Arteries-no carotid bruits  NEUROLOGIC:  Mental Status-the patient is awake alert and oriented without aphasia or apraxia  Cranial Nerves: Visual fields are full to confrontation    Extraocular movements are full without nystagmus  Pupils are 2-1/2 mm and reactive  Face is symmetrical to light touch  Movements of facial expression move symmetrically  Hearing is normal to finger rub bilaterally  Soft palate lifts symmetrically  Shoulder shrug is symmetrical  Tongue is midline without atrophy  Motor: No drift is noted on arm extension  Strength is full in the upper and lower extremities with normal bulk and tone  Sensory:  Diminished temperature and vibratory sensation in the distal lower extremities bilaterally  Cortical function is intact  Coordination: Finger to nose testing is performed accurately  Romberg is remarkable for increased sway with eyes closed  Gait reveals a normal base with subtle decreased left arm swing  Tandem walk is performed with frequently stepping off  Reflexes:  1/4 in the biceps, triceps and brachioradialis regions, trace at the knees and absent at the ankles  Toes are downgoing   MUSCULOSKELETAL:  Back is straight and nontender  Straight leg raising is remarkable for hamstring tightness bilaterally  No pain is noted with internal external rotation at the hips  She is able to flex to touch to the ankles bilaterally            ROS:    Review of Systems   Constitutional: Negative  Negative for appetite change and fever  HENT: Negative  Negative for hearing loss, tinnitus, trouble swallowing and voice change  Eyes: Negative  Negative for photophobia and pain  Respiratory: Negative  Negative for shortness of breath  Cardiovascular: Negative  Negative for palpitations  Gastrointestinal: Negative  Negative for nausea and vomiting  Endocrine: Negative  Negative for cold intolerance  Genitourinary: Negative  Negative for dysuria, frequency and urgency  Musculoskeletal: Positive for gait problem  Negative for myalgias and neck pain  Skin: Negative  Negative for rash  Neurological: Positive for dizziness, light-headedness and numbness  Negative for tremors, seizures, syncope, facial asymmetry, speech difficulty, weakness and headaches  Patient states numbness in feet  Hematological: Negative  Does not bruise/bleed easily  Psychiatric/Behavioral: Positive for sleep disturbance  Negative for confusion and hallucinations

## 2022-06-08 ENCOUNTER — OFFICE VISIT (OUTPATIENT)
Dept: INTERNAL MEDICINE CLINIC | Facility: CLINIC | Age: 82
End: 2022-06-08
Payer: COMMERCIAL

## 2022-06-08 VITALS
WEIGHT: 194.6 LBS | DIASTOLIC BLOOD PRESSURE: 78 MMHG | SYSTOLIC BLOOD PRESSURE: 124 MMHG | TEMPERATURE: 96 F | HEART RATE: 91 BPM | RESPIRATION RATE: 18 BRPM | OXYGEN SATURATION: 96 % | BODY MASS INDEX: 35.59 KG/M2

## 2022-06-08 DIAGNOSIS — S29.9XXA TRAUMA OF CHEST, INITIAL ENCOUNTER: Primary | ICD-10-CM

## 2022-06-08 PROCEDURE — 1036F TOBACCO NON-USER: CPT | Performed by: INTERNAL MEDICINE

## 2022-06-08 PROCEDURE — 99213 OFFICE O/P EST LOW 20 MIN: CPT | Performed by: INTERNAL MEDICINE

## 2022-06-08 NOTE — PATIENT INSTRUCTIONS
A patient with a trip and fall and now a chest pain suggesting a traumatic rib fracture  Will x-ray both chest and right ribs

## 2022-06-08 NOTE — PROGRESS NOTES
Assessment/Plan:       Diagnoses and all orders for this visit:    Trauma of chest, initial encounter  -     XR ribs right w pa chest min 3 views; Future                Subjective:      Patient ID: Alvena Halsted is a 80 y o  female  6 days ago the patient fell  She actually cannot give me a good reason  She was walking and suddenly purging forward  She landed on the outstretched hand unfortunately does not have a Colles fracture but now she does have a complaint of pain felt under the base of the right breast exacerbated by direct pressure and also by breathing and now  So, the pain suggest a rib fracture in we should get an x-ray for informational purposes  The following portions of the patient's history were reviewed and updated as appropriate:   She has a past medical history of Arthritis, Asthma, Benign colon polyp, Endometrial cancer ([facility] ) (2015), Esophagitis, reflux, Hemorrhoids, internal, and Sebaceous gland disease  ,  does not have any pertinent problems on file  ,   has a past surgical history that includes Appendectomy; Oophorectomy; Lymph node biopsy; Hysterectomy; Laparoscopy; Knee arthroscopy; Robotic assisted hysterectomy; Wrist surgery; Salpingoophorectomy (Bilateral); Lymphadenectomy; Tubal ligation; HAND FRACTURE REPAIR; Lymphadenectomy; pr total knee arthroplasty (Left, 2/18/2019); and Cataract extraction (Right, 2021)  ,  family history includes Breast cancer (age of onset: 28) in her mother; Cancer in her father and mother; No Known Problems in her daughter, maternal aunt, maternal aunt, maternal aunt, maternal grandmother, paternal aunt, paternal aunt, and paternal grandmother; Stomach cancer in her father  ,   reports that she has never smoked  She has never used smokeless tobacco  She reports previous alcohol use  She reports that she does not use drugs  ,  has No Known Allergies     Current Outpatient Medications   Medication Sig Dispense Refill    aspirin 81 mg chewable tablet Chew 81 mg daily      calcium carbonate-vitamin D (OSCAL-D) 500 mg-200 units per tablet Take 1 tablet by mouth 2 (two) times a day with meals        Cyanocobalamin (VITAMIN B-12) 1000 MCG/15ML LIQD Take 4 tablets by mouth daily        dorzolamide-timolol (COSOPT) 22 3-6 8 MG/ML ophthalmic solution       gabapentin (NEURONTIN) 100 mg capsule 1-3 pills 3 times daily as directed 100 capsule 5    latanoprost (XALATAN) 0 005 % ophthalmic solution Apply 1 drop to eye      Multiple Vitamins-Minerals (MULTI-B-PLUS) TABS Take by mouth      tacrolimus (PROTOPIC) 0 1 % ointment        TURMERIC PO Take 500 Units by mouth daily       No current facility-administered medications for this visit  Review of Systems   Musculoskeletal:        Chest pain localized to the anterolateral chest wall right-sided chest under the breast          Objective:  Vitals:    06/08/22 1526   BP: 124/78   Pulse: 91   Resp: 18   Temp: (!) 96 °F (35 6 °C)   SpO2: 96%      Physical Exam  Cardiovascular:      Rate and Rhythm: Normal rate and regular rhythm  Pulmonary:      Effort: Pulmonary effort is normal       Breath sounds: Normal breath sounds  Patient Instructions   A patient with a trip and fall and now a chest pain suggesting a traumatic rib fracture  Will x-ray both chest and right ribs

## 2022-06-09 ENCOUNTER — APPOINTMENT (OUTPATIENT)
Dept: RADIOLOGY | Facility: CLINIC | Age: 82
End: 2022-06-09
Payer: COMMERCIAL

## 2022-06-09 DIAGNOSIS — S29.9XXA TRAUMA OF CHEST, INITIAL ENCOUNTER: ICD-10-CM

## 2022-06-09 PROCEDURE — 71101 X-RAY EXAM UNILAT RIBS/CHEST: CPT

## 2022-07-11 ENCOUNTER — OFFICE VISIT (OUTPATIENT)
Dept: NEUROLOGY | Facility: CLINIC | Age: 82
End: 2022-07-11
Payer: COMMERCIAL

## 2022-07-11 VITALS
DIASTOLIC BLOOD PRESSURE: 80 MMHG | SYSTOLIC BLOOD PRESSURE: 122 MMHG | WEIGHT: 195 LBS | HEART RATE: 64 BPM | BODY MASS INDEX: 35.88 KG/M2 | HEIGHT: 62 IN

## 2022-07-11 DIAGNOSIS — G62.0 DRUG-INDUCED POLYNEUROPATHY (HCC): Primary | ICD-10-CM

## 2022-07-11 DIAGNOSIS — R27.8 SENSORY ATAXIA: ICD-10-CM

## 2022-07-11 PROCEDURE — 1160F RVW MEDS BY RX/DR IN RCRD: CPT | Performed by: PSYCHIATRY & NEUROLOGY

## 2022-07-11 PROCEDURE — 99213 OFFICE O/P EST LOW 20 MIN: CPT | Performed by: PSYCHIATRY & NEUROLOGY

## 2022-07-11 NOTE — PROGRESS NOTES
Jordan Sierra is a 80 y o  female returns in follow-up today with history of peripheral neuropathy and balance difficulty    Assessment:  1  Drug-induced polyneuropathy ([facility] )    2  Sensory ataxia        Plan:  Follow-up as needed    Discussion:  Josette Alcocer reports overall she has been doing well  She states she has not had any falls  She uses her straight cane to help maintain balance  She states she took the gabapentin for a while but decided to stop it and has not noticed any significant adverse effects  She is aware of numbness but denies any significant neuropathic pain symptoms  She will plan to continue present management would like to come back to see me on an as-needed basis      Subjective:    MITCH Alcocer returns in follow-up today  She reports that since here last she has been doing very well  She states she has not had any falls  She does state that she ambulates with a straight cane for safety and feels this is helpful  She states she took gabapentin for a while and did not notice any change in her numbness so she discontinued using it  She currently denies any significant symptoms of neuropathic pain    She denies any new health issues      Past Medical History:   Diagnosis Date    Arthritis     Asthma     Benign colon polyp     [facility] ([facility] ) 2015    Esophagitis, reflux     Hemorrhoids, internal     Sebaceous gland disease        Family History:  Family History   Problem Relation Age of Onset    Cancer Mother         unknown    Breast cancer Mother 28    Cancer Father     Stomach cancer Father     No Known Problems Daughter     No Known Problems Maternal Grandmother     No Known Problems Paternal Grandmother     No Known Problems Maternal Aunt     No Known Problems Maternal Aunt     No Known Problems Maternal Aunt     No Known Problems Paternal Aunt     No Known Problems Paternal Aunt        Past Surgical History:  Past Surgical History:   Procedure Laterality Date    APPENDECTOMY      CATARACT EXTRACTION Right 2021    HAND FRACTURE REPAIR      Open Treatment of Fracture of one Metacarpal Bone    HYSTERECTOMY      KNEE ARTHROSCOPY      Therapeutic    LYMPH NODE BIOPSY      LYMPHADENECTOMY      staging    LYMPHADENECTOMY      Staging    OOPHORECTOMY      PELVIC LAPAROSCOPY      [facility] TOTAL KNEE ARTHROPLASTY Left 2/18/2019    Procedure: ARTHROPLASTY KNEE TOTAL;  Surgeon: Jose Miller MD;  [facility];  Service: Orthopedics    ROBOTIC ASSISTED HYSTERECTOMY      SALPINGOOPHORECTOMY Bilateral     TUBAL LIGATION      WRIST SURGERY      open treatment of fracture of one metacarpal bone       Social History:   reports that she has never smoked  She has never used smokeless tobacco  She reports previous alcohol use  She reports that she does not use drugs  Allergies:  Patient has no known allergies        Current Outpatient Medications:     aspirin 81 mg chewable tablet, Chew 81 mg daily, Disp: , Rfl:     calcium carbonate-vitamin D (OSCAL-D) 500 mg-200 units per tablet, Take 1 tablet by mouth 2 (two) times a day with meals  , Disp: , Rfl:     Cyanocobalamin (VITAMIN B-12) 1000 MCG/15ML LIQD, Take 4 tablets by mouth daily  , Disp: , Rfl:     dorzolamide-timolol (COSOPT) 22 3-6 8 MG/ML ophthalmic solution, , Disp: , Rfl:     latanoprost (XALATAN) 0 005 % ophthalmic solution, Apply 1 drop to eye, Disp: , Rfl:     Multiple Vitamins-Minerals (MULTI-B-PLUS) TABS, Take by mouth, Disp: , Rfl:     tacrolimus (PROTOPIC) 0 1 % ointment,  , Disp: , Rfl:     TURMERIC PO, Take 500 Units by mouth daily, Disp: , Rfl:     gabapentin (NEURONTIN) 100 mg capsule, 1-3 pills 3 times daily as directed (Patient not taking: Reported on 7/11/2022), Disp: 100 capsule, Rfl: 5    I have reviewed the past medical, social and family history, current medications, allergies, vitals, review of systems and updated this information as appropriate today     Objective:    Vitals:  Blood pressure 122/80, pulse 64, height 5' 2" (1 575 m), weight 88 5 kg (195 lb)  Physical Exam    Neurological Exam  GENERAL:  Well-developed well-nourished woman in no acute distress  HEENT/NECK: Head is atraumatic normocephalic, neck is supple  NEUROLOGIC:  Mental Status: Awake and alert without aphasia  Cranial Nerves: Extraocular movements are full  Face is symmetrical  Motor:  No drift is noted on arm extension  Coordination:  Finger-to-nose testing is performed accurately  Romberg reveals mild sway with eyes closed  Gait is stable with a straight cane            ROS:    Review of Systems   Constitutional: Negative  Negative for appetite change and fever  HENT: Negative  Negative for hearing loss, tinnitus, trouble swallowing and voice change  Eyes: Negative  Negative for photophobia and pain  Respiratory: Negative  Negative for shortness of breath  Cardiovascular: Negative  Negative for palpitations  Gastrointestinal: Negative  Negative for nausea and vomiting  Endocrine: Negative  Negative for cold intolerance  Genitourinary: Negative  Negative for dysuria, frequency and urgency  Musculoskeletal: Negative  Negative for myalgias and neck pain  Skin: Negative  Negative for rash  Neurological: Positive for numbness  Negative for dizziness, tremors, seizures, syncope, facial asymmetry, speech difficulty, weakness, light-headedness and headaches  Patient states numbness in both feet  Hematological: Negative  Does not bruise/bleed easily  Psychiatric/Behavioral: Positive for sleep disturbance  Negative for confusion and hallucinations

## 2022-08-16 ENCOUNTER — RA CDI HCC (OUTPATIENT)
Dept: OTHER | Facility: HOSPITAL | Age: 82
End: 2022-08-16

## 2022-08-16 NOTE — PROGRESS NOTES
Patti [facility]  coding opportunities       Chart reviewed, no opportunity found:   [address]        Patients Insurance     Medicare Insurance: [employer]

## 2022-08-22 ENCOUNTER — OFFICE VISIT (OUTPATIENT)
Dept: INTERNAL MEDICINE CLINIC | Facility: CLINIC | Age: 82
End: 2022-08-22
Payer: COMMERCIAL

## 2022-08-22 VITALS
SYSTOLIC BLOOD PRESSURE: 126 MMHG | RESPIRATION RATE: 20 BRPM | TEMPERATURE: 98.6 F | HEART RATE: 78 BPM | BODY MASS INDEX: 35.51 KG/M2 | HEIGHT: 62 IN | DIASTOLIC BLOOD PRESSURE: 60 MMHG | OXYGEN SATURATION: 96 % | WEIGHT: 193 LBS

## 2022-08-22 DIAGNOSIS — R73.9 HYPERGLYCEMIA: ICD-10-CM

## 2022-08-22 DIAGNOSIS — J45.20 MILD INTERMITTENT ASTHMA WITHOUT COMPLICATION: ICD-10-CM

## 2022-08-22 DIAGNOSIS — G62.9 NEUROPATHY: Primary | ICD-10-CM

## 2022-08-22 PROCEDURE — 3725F SCREEN DEPRESSION PERFORMED: CPT | Performed by: INTERNAL MEDICINE

## 2022-08-22 PROCEDURE — 3288F FALL RISK ASSESSMENT DOCD: CPT | Performed by: INTERNAL MEDICINE

## 2022-08-22 PROCEDURE — G0439 PPPS, SUBSEQ VISIT: HCPCS | Performed by: INTERNAL MEDICINE

## 2022-08-22 PROCEDURE — 1170F FXNL STATUS ASSESSED: CPT | Performed by: INTERNAL MEDICINE

## 2022-08-22 PROCEDURE — 1125F AMNT PAIN NOTED PAIN PRSNT: CPT | Performed by: INTERNAL MEDICINE

## 2022-08-22 PROCEDURE — 99214 OFFICE O/P EST MOD 30 MIN: CPT | Performed by: INTERNAL MEDICINE

## 2022-08-22 PROCEDURE — 1160F RVW MEDS BY RX/DR IN RCRD: CPT | Performed by: INTERNAL MEDICINE

## 2022-08-22 RX ORDER — AMOXICILLIN 500 MG/1
CAPSULE ORAL
COMMUNITY
Start: 2022-08-04 | End: 2022-08-22

## 2022-08-22 NOTE — PATIENT INSTRUCTIONS
A patient with chronic diagnoses who feels well and is at a stable baseline  Needs her routine laboratory testing     Revisit yearly or as needed

## 2022-08-22 NOTE — PROGRESS NOTES
Assessment and Plan:     Problem List Items Addressed This Visit        Respiratory    Asthma - Primary    Relevant Orders    Lipid Panel with Direct LDL reflex    CBC and differential    Comprehensive metabolic panel    TSH, 3rd generation with Free T4 reflex    UA (URINE) with reflex to Scope    Microalbumin / creatinine urine ratio    Hemoglobin A1C       Nervous and Auditory    Neuropathy    Relevant Orders    Lipid Panel with Direct LDL reflex    CBC and differential    Comprehensive metabolic panel    TSH, 3rd generation with Free T4 reflex    UA (URINE) with reflex to Scope    Microalbumin / creatinine urine ratio    Hemoglobin A1C      Other Visit Diagnoses     Hyperglycemia        Relevant Orders    Lipid Panel with Direct LDL reflex    CBC and differential    Comprehensive metabolic panel    TSH, 3rd generation with Free T4 reflex    UA (URINE) with reflex to Scope    Microalbumin / creatinine urine ratio    Hemoglobin A1C        BMI Counseling: Body mass index is 35 3 kg/m²  The BMI is above normal  Nutrition recommendations include decreasing portion sizes and moderation in carbohydrate intake  Rationale for BMI follow-up plan is due to patient being overweight or obese  Depression Screening and Follow-up Plan: Patient was screened for depression during today's encounter  They screened negative with a PHQ-2 score of 0  Preventive health issues were discussed with patient, and age appropriate screening tests were ordered as noted in patient's After Visit Summary  Personalized health advice and appropriate referrals for health education or preventive services given if needed, as noted in patient's After Visit Summary  History of Present Illness:     Patient presents for a Medicare Wellness Visit    68-year-old female A 68-year-old female   Dizziness, falls, and gait ataxia  This has been a recurrent problem for the past 2 years  Initial report was February 2020    History and physical were nondiagnostic and brain MRI was normal     She feels off balance  She has been using a cane  she had reported this in February 2020  She takes no medications that conceivably could worsen this  No alcohol and no drugs  She also reports a sensation of numbness of the bottoms of the feet , but that is not new  She has been to a neurologist and there has really been no effective treatment but she has not fallen lately  She is careful with her gait and she uses a cane  Again in early 2020 the thought was this might be related to chemotherapy for the endometrial cancer but she has not been on chemo for a couple of years    Endometrial cancer [facility] at [facility]  No recurrent  Associated rhinitis  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 extending from calcaneus to the midfoot  History of benign colon polyp  Colonoscopy done earlier in 2018  Mammogram done  on a regular basis has been normal  Lichen simplex chronicus of the feet is treated by Dermatology with variable results  age-related osteoporosis of been reported  Taking vitamin-D long-term  This needs a recheck      left total knee replacement done early 2019 with excellent outcome   Right total knee replacement done about a year and half ago Unfortunately, this was not as successful as the left knee and she still has some residual pain         She developed visual defect of the right eye  She went to an ophthalmologist who is diagnosis is "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 "  Apparently, the suggestion is that this is due to either hyperlipidemia or hypertension  I spoke with the ophthalmologist and explain that she is not hypertensive nor hyperlipidemic  I reviewed the numbers with him  He agreed that there was no indication to treat with antihypertensive or antihyperlipidemic drugs    And he suggested that the only treatment for this situation would be addition of aspirin 81 mg daily which she had already suggested to the patient  At a distance of about 3 years this has now improved back to a baseline state  She has visual difficulty which is multifactorial   In addition to this, she is diagnosed with glaucoma, and she is at cataract surgery  She continues to foll             Patient Care Team:  Ramona Hurtado MD as PCP - Alisha Thomas MD as PCP - [address] [place] [address] (RTE)  Ramona Hurtado MD as PCP - [facility] (RTE)     Review of Systems:     Review of Systems   Eyes: Positive for visual disturbance  Musculoskeletal: Positive for arthralgias  Neurological: Positive for light-headedness  All other systems reviewed and are negative         Problem List:     Patient Active Problem List   Diagnosis    History of endometrial cancer    Age-related osteoporosis without current pathological fracture    Benign colon polyp    Arthritis    Breast screening    Encounter for immunization    Asthma    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    Health care maintenance    Encounter for follow-up surveillance of endometrial cancer    Rib pain on left side    Frequent falls    Seborrhea    Ataxia    Right hand pain    Malignant neoplasm of endometrium ([address] )    Neuropathy    Mixed stress and urge urinary incontinence      Past Medical and Surgical History:     Past Medical History:   Diagnosis Date    Arthritis     Asthma     Benign colon polyp     Endometrial cancer ([address] ) 2015    Esophagitis, reflux     Hemorrhoids, internal     Sebaceous gland disease      Past Surgical History:   Procedure Laterality Date    APPENDECTOMY      CATARACT EXTRACTION Right 2021    HAND FRACTURE REPAIR      Open Treatment of Fracture of one Metacarpal Bone    HYSTERECTOMY      KNEE ARTHROSCOPY      Therapeutic    LYMPH NODE BIOPSY      LYMPHADENECTOMY      staging    LYMPHADENECTOMY      Staging    OOPHORECTOMY      PELVIC LAPAROSCOPY  [facility] TOTAL KNEE ARTHROPLASTY Left 2/18/2019    Procedure: ARTHROPLASTY KNEE TOTAL;  Surgeon: Maile Carrillo MD;  [facility];  Service: Orthopedics    ROBOTIC ASSISTED HYSTERECTOMY      SALPINGOOPHORECTOMY Bilateral     TUBAL LIGATION      WRIST SURGERY      open treatment of fracture of one metacarpal bone      Family History:     Family History   Problem Relation Age of Onset    Cancer Mother         unknown    Breast cancer Mother 28    Cancer Father     Stomach cancer Father     No Known Problems Daughter     No Known Problems Maternal Grandmother     No Known Problems Paternal Grandmother     No Known Problems Maternal Aunt     No Known Problems Maternal Aunt     No Known Problems Maternal Aunt     No Known Problems Paternal Aunt     No Known Problems Paternal Aunt       Social History:     Social History     Socioeconomic History    Marital status: /Civil Union     Spouse name: None    Number of children: None    Years of education: None    Highest education level: None   Occupational History    None   Tobacco Use    Smoking status: Never Smoker    Smokeless tobacco: Never Used   Vaping Use    Vaping Use: Never used   Substance and Sexual Activity    Alcohol use: Not Currently    Drug use: No    Sexual activity: Not Currently     Partners: Male   Other Topics Concern    None   Social History Narrative    Housewife or homemaker    Lives with relatives     Live with spouse    Travel to [facility]    Travel to [facility]     Social Determinants of Health     Financial Resource Strain: Low Risk     Difficulty of Paying Living Expenses: Not very hard   Food Insecurity: Not on file   Transportation Needs: No Transportation Needs    Lack of Transportation (Medical): No    Lack of Transportation (Non-Medical):  No   Physical Activity: Not on file   Stress: Not on file   Social Connections: Not on file   Intimate Partner Violence: Not on file   Housing Stability: Not on file Medications and Allergies:     Current Outpatient Medications   Medication Sig Dispense Refill    aspirin 81 mg chewable tablet Chew 81 mg daily      calcium carbonate-vitamin D (OSCAL-D) 500 mg-200 units per tablet Take 1 tablet by mouth 2 (two) times a day with meals        Cyanocobalamin (VITAMIN B-12) 1000 MCG/15ML LIQD Take 4 tablets by mouth daily        dorzolamide-timolol (COSOPT) 22 3-6 8 MG/ML ophthalmic solution       latanoprost (XALATAN) 0 005 % ophthalmic solution Apply 1 drop to eye      Multiple Vitamins-Minerals (MULTI-B-PLUS) TABS Take by mouth      TURMERIC PO Take 500 Units by mouth daily      tacrolimus (PROTOPIC) 0 1 % ointment   (Patient not taking: Reported on 8/22/2022)       No current facility-administered medications for this visit  No Known Allergies   Immunizations:     Immunization History   Administered Date(s) Administered    COVID-19 PFIZER VACCINE 0 3 ML IM 02/22/2021, 03/15/2021    INFLUENZA 12/01/2014, 01/05/2016, 01/04/2017, 01/08/2018    Influenza Quadrivalent 3 years and older 12/01/2014, 01/05/2016, 01/04/2017, 01/08/2018    Influenza Split High Dose Preservative Free IM 12/01/2014, 01/05/2016, 01/04/2017, 01/08/2018    Influenza, high dose seasonal 0 7 mL 10/23/2018, 11/06/2019, 11/04/2020, 10/22/2021    Influenza, seasonal, injectable 12/12/2013    Pneumococcal Conjugate 13-Valent 01/05/2016, 02/10/2017    Pneumococcal Polysaccharide PPV23 10/23/2018    Tdap 12/01/2014    Varicella 02/03/2016      Health Maintenance:         Topic Date Due    Breast Cancer Screening: Mammogram  10/11/2022    DXA SCAN  11/05/2022    Hepatitis C Screening  Completed         Topic Date Due    COVID-19 Vaccine (3 - Booster for VideoPros series) 08/15/2021    Influenza Vaccine (1) 09/01/2022      Medicare Screening Tests and Risk Assessments:     Rudy Garner is here for her Subsequent Wellness visit   Last Medicare Wellness visit information reviewed, patient interviewed and updates made to the record today  Health Risk Assessment:   Patient rates overall health as good  Patient feels that their physical health rating is same  Patient is satisfied with their life  Eyesight was rated as same  Hearing was rated as same  Patient feels that their emotional and mental health rating is same  Patients states they are never, rarely angry  Patient states they are never, rarely unusually tired/fatigued  Pain experienced in the last 7 days has been some  Patient's pain rating has been 3/10  Patient states that she has experienced no weight loss or gain in last 6 months  Depression Screening:   PHQ-2 Score: 0      Fall Risk Screening: In the past year, patient has experienced: no history of falling in past year      Urinary Incontinence Screening:   Patient has leaked urine accidently in the last six months  Home Safety:  Patient does not have trouble with stairs inside or outside of their home  Patient has working smoke alarms and has working carbon monoxide detector  Home safety hazards include: none  Nutrition:   Current diet is Low Carb and No Added Salt  Medications:   Patient is currently taking over-the-counter supplements  OTC medications include: see medication list  Patient is able to manage medications  Activities of Daily Living (ADLs)/Instrumental Activities of Daily Living (IADLs):   Walk and transfer into and out of bed and chair?: Yes  Dress and groom yourself?: Yes    Bathe or shower yourself?: Yes    Feed yourself?  Yes  Do your laundry/housekeeping?: Yes  Manage your money, pay your bills and track your expenses?: Yes  Make your own meals?: Yes    Do your own shopping?: Yes    Previous Hospitalizations:   Any hospitalizations or ED visits within the last 12 months?: No      Advance Care Planning:   Living will: Yes    Advanced directive: Yes      Cognitive Screening:   Provider or family/friend/caregiver concerned regarding cognition?: No    PREVENTIVE SCREENINGS      Cardiovascular Screening:    General: Screening Current      Breast Cancer Screening:     General: Screening Current      Cervical Cancer Screening:    General: Screening Not Indicated      Osteoporosis Screening:    General: Screening Not Indicated and History Osteoporosis      Abdominal Aortic Aneurysm (AAA) Screening:        General: Screening Not Indicated      Lung Cancer Screening:     General: Screening Not Indicated      Hepatitis C Screening:    General: Screening Current    Screening, Brief Intervention, and Referral to Treatment (SBIRT)    Screening  Typical number of drinks in a day: 0  Typical number of drinks in a week: 0  Interpretation: Low risk drinking behavior  Single Item Drug Screening:  How often have you used an illegal drug (including marijuana) or a prescription medication for non-medical reasons in the past year? never    Single Item Drug Screen Score: 0  Interpretation: Negative screen for possible drug use disorder    No exam data present     Physical Exam:     /60 (BP Location: Left arm, Patient Position: Sitting, Cuff Size: Large)   Pulse 78   Temp 98 6 °F (37 °C) (Tympanic)   Resp 20   Ht 5' 2" (1 575 m)   Wt 87 5 kg (193 lb)   SpO2 96%   BMI 35 30 kg/m²     Physical Exam  Vitals and nursing note reviewed  Constitutional:       General: She is not in acute distress  Appearance: She is well-developed  HENT:      Head: Normocephalic and atraumatic  Eyes:      General: No scleral icterus  Conjunctiva/sclera: Conjunctivae normal    Cardiovascular:      Rate and Rhythm: Normal rate and regular rhythm  Heart sounds: No murmur heard  Pulmonary:      Effort: Pulmonary effort is normal  No respiratory distress  Breath sounds: Normal breath sounds  Abdominal:      Palpations: Abdomen is soft  Tenderness: There is no abdominal tenderness  Musculoskeletal:      Cervical back: Neck supple  Skin:     General: Skin is warm and dry  Neurological:      Mental Status: She is alert     Psychiatric:         Mood and Affect: Mood normal          Judgment: Judgment normal           Tuan Jenkins MD

## 2022-08-23 ENCOUNTER — APPOINTMENT (OUTPATIENT)
Dept: LAB | Facility: CLINIC | Age: 82
End: 2022-08-23
Payer: COMMERCIAL

## 2022-08-23 DIAGNOSIS — R73.9 HYPERGLYCEMIA: ICD-10-CM

## 2022-08-23 DIAGNOSIS — G62.9 NEUROPATHY: ICD-10-CM

## 2022-08-23 DIAGNOSIS — J45.20 MILD INTERMITTENT ASTHMA WITHOUT COMPLICATION: ICD-10-CM

## 2022-08-23 LAB
ALBUMIN SERPL BCP-MCNC: 3.8 G/DL (ref 3.5–5)
ALP SERPL-CCNC: 70 U/L (ref 46–116)
ALT SERPL W P-5'-P-CCNC: 25 U/L (ref 12–78)
ANION GAP SERPL CALCULATED.3IONS-SCNC: 4 MMOL/L (ref 4–13)
AST SERPL W P-5'-P-CCNC: 22 U/L (ref 5–45)
BACTERIA UR QL AUTO: ABNORMAL /HPF
BASOPHILS # BLD AUTO: 0.02 THOUSANDS/ΜL (ref 0–0.1)
BASOPHILS NFR BLD AUTO: 0 % (ref 0–1)
BILIRUB SERPL-MCNC: 0.68 MG/DL (ref 0.2–1)
BILIRUB UR QL STRIP: NEGATIVE
BUN SERPL-MCNC: 12 MG/DL (ref 5–25)
CALCIUM SERPL-MCNC: 9.4 MG/DL (ref 8.3–10.1)
CHLORIDE SERPL-SCNC: 106 MMOL/L (ref 96–108)
CHOLEST SERPL-MCNC: 187 MG/DL
CLARITY UR: CLEAR
CO2 SERPL-SCNC: 26 MMOL/L (ref 21–32)
COLOR UR: YELLOW
CREAT SERPL-MCNC: 0.72 MG/DL (ref 0.6–1.3)
CREAT UR-MCNC: 128 MG/DL
EOSINOPHIL # BLD AUTO: 0.14 THOUSAND/ΜL (ref 0–0.61)
EOSINOPHIL NFR BLD AUTO: 3 % (ref 0–6)
ERYTHROCYTE [DISTWIDTH] IN BLOOD BY AUTOMATED COUNT: 12.9 % (ref 11.6–15.1)
EST. AVERAGE GLUCOSE BLD GHB EST-MCNC: 117 MG/DL
GFR SERPL CREATININE-BSD FRML MDRD: 79 ML/MIN/1.73SQ M
GLUCOSE P FAST SERPL-MCNC: 100 MG/DL (ref 65–99)
GLUCOSE UR STRIP-MCNC: NEGATIVE MG/DL
HBA1C MFR BLD: 5.7 %
HCT VFR BLD AUTO: 43.6 % (ref 34.8–46.1)
HDLC SERPL-MCNC: 54 MG/DL
HGB BLD-MCNC: 14.3 G/DL (ref 11.5–15.4)
HGB UR QL STRIP.AUTO: NEGATIVE
HYALINE CASTS #/AREA URNS LPF: ABNORMAL /LPF
IMM GRANULOCYTES # BLD AUTO: 0.02 THOUSAND/UL (ref 0–0.2)
IMM GRANULOCYTES NFR BLD AUTO: 0 % (ref 0–2)
KETONES UR STRIP-MCNC: NEGATIVE MG/DL
LDLC SERPL CALC-MCNC: 107 MG/DL (ref 0–100)
LEUKOCYTE ESTERASE UR QL STRIP: ABNORMAL
LYMPHOCYTES # BLD AUTO: 1.49 THOUSANDS/ΜL (ref 0.6–4.47)
LYMPHOCYTES NFR BLD AUTO: 31 % (ref 14–44)
MCH RBC QN AUTO: 31 PG (ref 26.8–34.3)
MCHC RBC AUTO-ENTMCNC: 32.8 G/DL (ref 31.4–37.4)
MCV RBC AUTO: 94 FL (ref 82–98)
MICROALBUMIN UR-MCNC: 9.8 MG/L (ref 0–20)
MICROALBUMIN/CREAT 24H UR: 8 MG/G CREATININE (ref 0–30)
MONOCYTES # BLD AUTO: 0.37 THOUSAND/ΜL (ref 0.17–1.22)
MONOCYTES NFR BLD AUTO: 8 % (ref 4–12)
MUCOUS THREADS UR QL AUTO: ABNORMAL
NEUTROPHILS # BLD AUTO: 2.79 THOUSANDS/ΜL (ref 1.85–7.62)
NEUTS SEG NFR BLD AUTO: 58 % (ref 43–75)
NITRITE UR QL STRIP: NEGATIVE
NON-SQ EPI CELLS URNS QL MICRO: ABNORMAL /HPF
NRBC BLD AUTO-RTO: 0 /100 WBCS
PH UR STRIP.AUTO: 6 [PH]
PLATELET # BLD AUTO: 238 THOUSANDS/UL (ref 149–390)
PMV BLD AUTO: 10.9 FL (ref 8.9–12.7)
POTASSIUM SERPL-SCNC: 4.1 MMOL/L (ref 3.5–5.3)
PROT SERPL-MCNC: 7.4 G/DL (ref 6.4–8.4)
PROT UR STRIP-MCNC: NEGATIVE MG/DL
RBC # BLD AUTO: 4.62 MILLION/UL (ref 3.81–5.12)
RBC #/AREA URNS AUTO: ABNORMAL /HPF
SODIUM SERPL-SCNC: 136 MMOL/L (ref 135–147)
SP GR UR STRIP.AUTO: 1.02 (ref 1–1.03)
TRIGL SERPL-MCNC: 132 MG/DL
TSH SERPL DL<=0.05 MIU/L-ACNC: 4.04 UIU/ML (ref 0.45–4.5)
UROBILINOGEN UR STRIP-ACNC: <2 MG/DL
WBC # BLD AUTO: 4.83 THOUSAND/UL (ref 4.31–10.16)
WBC #/AREA URNS AUTO: ABNORMAL /HPF

## 2022-08-23 PROCEDURE — 82570 ASSAY OF URINE CREATININE: CPT | Performed by: INTERNAL MEDICINE

## 2022-08-23 PROCEDURE — 84443 ASSAY THYROID STIM HORMONE: CPT

## 2022-08-23 PROCEDURE — 80061 LIPID PANEL: CPT

## 2022-08-23 PROCEDURE — 81001 URINALYSIS AUTO W/SCOPE: CPT | Performed by: INTERNAL MEDICINE

## 2022-08-23 PROCEDURE — 80053 COMPREHEN METABOLIC PANEL: CPT

## 2022-08-23 PROCEDURE — 82043 UR ALBUMIN QUANTITATIVE: CPT | Performed by: INTERNAL MEDICINE

## 2022-08-23 PROCEDURE — 83036 HEMOGLOBIN GLYCOSYLATED A1C: CPT

## 2022-08-23 PROCEDURE — 36415 COLL VENOUS BLD VENIPUNCTURE: CPT

## 2022-08-23 PROCEDURE — 85025 COMPLETE CBC W/AUTO DIFF WBC: CPT

## 2022-09-28 ENCOUNTER — TELEPHONE (OUTPATIENT)
Dept: GYNECOLOGIC ONCOLOGY | Facility: CLINIC | Age: 82
End: 2022-09-28

## 2022-09-28 DIAGNOSIS — Z12.31 SCREENING MAMMOGRAM FOR BREAST CANCER: Primary | ICD-10-CM

## 2022-10-13 ENCOUNTER — TELEPHONE (OUTPATIENT)
Dept: INTERNAL MEDICINE CLINIC | Facility: CLINIC | Age: 82
End: 2022-10-13

## 2022-10-13 NOTE — TELEPHONE ENCOUNTER
Please have Dr Agueda Franklin put in an order for the New Bivalent Booster    Pt is scheduled for 11/30 at [facility]

## 2022-10-14 DIAGNOSIS — Z23 ENCOUNTER FOR IMMUNIZATION: Primary | ICD-10-CM

## 2022-10-17 DIAGNOSIS — Z23 NEED FOR INFLUENZA VACCINATION: Primary | ICD-10-CM

## 2022-10-19 ENCOUNTER — CLINICAL SUPPORT (OUTPATIENT)
Dept: INTERNAL MEDICINE CLINIC | Facility: CLINIC | Age: 82
End: 2022-10-19
Payer: COMMERCIAL

## 2022-10-19 DIAGNOSIS — Z23 NEED FOR INFLUENZA VACCINATION: Primary | ICD-10-CM

## 2022-10-19 PROCEDURE — G0008 ADMIN INFLUENZA VIRUS VAC: HCPCS

## 2022-10-19 PROCEDURE — 90662 IIV NO PRSV INCREASED AG IM: CPT

## 2022-10-25 ENCOUNTER — HOSPITAL ENCOUNTER (OUTPATIENT)
Dept: MAMMOGRAPHY | Facility: CLINIC | Age: 82
Discharge: HOME | End: 2022-10-25
Payer: COMMERCIAL

## 2022-10-25 DIAGNOSIS — Z12.31 SCREENING MAMMOGRAM FOR BREAST CANCER: ICD-10-CM

## 2022-10-25 PROCEDURE — 77067 SCR MAMMO BI INCL CAD: CPT

## 2022-10-25 PROCEDURE — 77063 BREAST TOMOSYNTHESIS BI: CPT

## 2022-11-16 ENCOUNTER — TELEPHONE (OUTPATIENT)
Dept: GYNECOLOGIC ONCOLOGY | Facility: CLINIC | Age: 82
End: 2022-11-16

## 2022-11-16 NOTE — TELEPHONE ENCOUNTER
Left message for patient to reschedule appointment with Steph Schwab on 12/9  Offered 12/2 at [facility] in [place] [facility] [place], or a different day at [place]  Asked for call back

## 2022-11-30 ENCOUNTER — IMMUNIZATIONS (OUTPATIENT)
Dept: FAMILY MEDICINE CLINIC | Facility: CLINIC | Age: 82
End: 2022-11-30

## 2022-11-30 DIAGNOSIS — Z23 NEED FOR VACCINATION: Primary | ICD-10-CM

## 2022-12-01 ENCOUNTER — TELEPHONE (OUTPATIENT)
Dept: GYNECOLOGIC ONCOLOGY | Facility: CLINIC | Age: 82
End: 2022-12-01

## 2022-12-01 NOTE — TELEPHONE ENCOUNTER
Left message again for patient to reschedule appointment with Melissa Mukherjee on 12/9  Offered 12/2 at [facility] in [place], or a different day at [place]  Asked for call back

## 2023-01-06 ENCOUNTER — OFFICE VISIT (OUTPATIENT)
Dept: GYNECOLOGIC ONCOLOGY | Facility: CLINIC | Age: 83
End: 2023-01-06

## 2023-01-06 VITALS
WEIGHT: 193.5 LBS | BODY MASS INDEX: 35.61 KG/M2 | HEIGHT: 62 IN | HEART RATE: 91 BPM | DIASTOLIC BLOOD PRESSURE: 78 MMHG | SYSTOLIC BLOOD PRESSURE: 132 MMHG | OXYGEN SATURATION: 99 % | TEMPERATURE: 98.5 F | RESPIRATION RATE: 18 BRPM

## 2023-01-06 DIAGNOSIS — Z85.42 HISTORY OF ENDOMETRIAL CANCER: Primary | ICD-10-CM

## 2023-01-06 DIAGNOSIS — Z08 ENCOUNTER FOR FOLLOW-UP SURVEILLANCE OF ENDOMETRIAL CANCER: ICD-10-CM

## 2023-01-06 DIAGNOSIS — Z85.42 ENCOUNTER FOR FOLLOW-UP SURVEILLANCE OF ENDOMETRIAL CANCER: ICD-10-CM

## 2023-01-06 DIAGNOSIS — Z12.31 SCREENING MAMMOGRAM FOR BREAST CANCER: ICD-10-CM

## 2023-01-06 NOTE — PROGRESS NOTES
Assessment/Plan:    Problem List Items Addressed This Visit        Other    History of endometrial cancer - Primary     History of stage IB, grade 2 endometrial cancer with positive cytology  She is s/p completion of adjuvant chemotherapy and radiation in November 2015  She is clinically without evidence of disease recurrence       Return to the office in 1 year for annual surveillance           Encounter for follow-up surveillance of endometrial cancer   Other Visit Diagnoses     Screening mammogram for breast cancer        Relevant Orders    Mammo screening bilateral w 3d & cad            CHIEF COMPLAINT:   Endometrial cancer surveillance    Problem:  Cancer Staging   History of endometrial cancer  Staging form: Corpus Uteri - Carcinoma, AJCC 8th Edition  - Clinical: FIGO Stage IB - Signed by Huan Antonio PA-C on 5/14/2018        Previous therapy:  Oncology History   History of endometrial cancer    Initial Diagnosis    Endometrial cancer ([facility] [address] )     7/6/2015 Surgery    Robotic-assisted total laparoscopic hysterectomy, bilateral salpingo-oophorectomy, pelvic and para-aortic lymph node dissections on   A  1 3 out of 1 8 cm depth of invasion, negative nodes, no LVSI, positive peritoneal cytology      - 11/20/2015 Chemotherapy    Paclitaxel 175 mg/m2 and carboplatin AUC 6  She received six cycles  - 11/2015 Radiation    Vaginal brachytherapy           Patient ID: Basilio Rainey is a [de-identified] y o  female  who has no new complaints today  No vaginal bleeding, abdominal/pelvic pain  Normal bowel function  She continues to note urinary incontinence  She has seen uro-gynecology  No interval change in medical history since last visit  Quality of life is good  The following portions of the patient's history were reviewed and updated as appropriate: allergies, current medications, past medical history, past surgical history and problem list     Review of Systems   Constitutional: Negative  HENT: Negative      Eyes: Negative  Respiratory: Negative  Cardiovascular: Negative  Gastrointestinal: Negative  Genitourinary:        As per HPI  Musculoskeletal: Negative  Skin: Negative  Neurological: Negative  Psychiatric/Behavioral: Negative  Current Outpatient Medications   Medication Sig Dispense Refill   • aspirin 81 mg chewable tablet Chew 81 mg daily     • calcium carbonate-vitamin D (OSCAL-D) 500 mg-200 units per tablet Take 1 tablet by mouth 2 (two) times a day with meals       • Cyanocobalamin (VITAMIN B-12) 1000 MCG/15ML LIQD Take 4 tablets by mouth daily       • dorzolamide-timolol (COSOPT) 22 3-6 8 MG/ML ophthalmic solution      • latanoprost (XALATAN) 0 005 % ophthalmic solution Apply 1 drop to eye     • Multiple Vitamins-Minerals (MULTI-B-PLUS) TABS Take by mouth     • TURMERIC PO Take 500 Units by mouth daily     • tacrolimus (PROTOPIC) 0 1 % ointment   (Patient not taking: Reported on 8/22/2022)       No current facility-administered medications for this visit  Objective:    Blood pressure 132/78, pulse 91, temperature 98 5 °F (36 9 °C), temperature source Temporal, resp  rate 18, height 5' 2" (1 575 m), weight 87 8 kg (193 lb 8 oz), SpO2 99 %  Body mass index is 35 39 kg/m²  Body surface area is 1 89 meters squared  Physical Exam  Vitals reviewed  Exam conducted with a chaperone present  Constitutional:       General: She is not in acute distress  Appearance: Normal appearance  She is not ill-appearing  HENT:      Head: Normocephalic and atraumatic  Mouth/Throat:      Mouth: Mucous membranes are moist    Eyes:      General:         Right eye: No discharge  Left eye: No discharge  Conjunctiva/sclera: Conjunctivae normal    Pulmonary:      Effort: Pulmonary effort is normal    Abdominal:      Palpations: Abdomen is soft  There is no mass  Tenderness: There is no abdominal tenderness  Hernia: No hernia is present     Genitourinary:     Comments: The external female genitalia is normal  The bartholin's, uretheral and skenes glands are normal  The urethral meatus is normal (midline with no lesions)  Anus without fissure or lesion  Speculum exam reveals a grossly normal vagina  Radiation changes present  No masses, lesions,discharge or bleeding  No significant cystocele or rectocele noted  Bimanual exam notes a surgical absent cervix, uterus and adnexal structures  No masses or fullness  Bladder is without fullness, mass or tenderness  Musculoskeletal:      Right lower leg: No edema  Left lower leg: No edema  Skin:     General: Skin is warm and dry  Coloration: Skin is not jaundiced  Findings: No rash  Neurological:      General: No focal deficit present  Mental Status: She is alert and oriented to person, place, and time  Cranial Nerves: No cranial nerve deficit  Sensory: No sensory deficit  Motor: No weakness  Gait: Gait normal    Psychiatric:         Mood and Affect: Mood normal          Behavior: Behavior normal          Thought Content:  Thought content normal          Judgment: Judgment normal

## 2023-01-06 NOTE — ASSESSMENT & PLAN NOTE
History of stage IB, grade 2 endometrial cancer with positive cytology  She is s/p completion of adjuvant chemotherapy and radiation in November 2015   She is clinically without evidence of disease recurrence       Return to the office in 1 year for annual surveillance

## 2023-05-26 ENCOUNTER — TELEPHONE (OUTPATIENT)
Dept: NEUROLOGY | Facility: CLINIC | Age: 83
End: 2023-05-26

## 2023-05-26 NOTE — TELEPHONE ENCOUNTER
Received VM transcription: This is Geneva Dearth  I would like to make an appointment with Dr Eliza Bueno please  If you could call me back [phone]  I will appreciate it  Thank you   --------------------------------------------------------    LVM informing pt that her message was received and that the scheduling team would be notified to call her back to schedule with provider

## 2023-05-26 NOTE — TELEPHONE ENCOUNTER
I called patient back and left a detailed message stating that we received her message that she would like to schedule an appointment with Dr Constance Homans  I noticed she had seen Dr Gabriela Mcdonald in the past and what we would have to do is a transfer of care just getting approval from both doctors that we can transfer her with Dr Constance Homans  I had kindly asked the patient to call us back in letting us know the reason why she would like to see Dr Constance Homans and I have provided our call back number

## 2023-08-14 ENCOUNTER — RA CDI HCC (OUTPATIENT)
Dept: OTHER | Facility: HOSPITAL | Age: 83
End: 2023-08-14

## 2023-08-14 NOTE — PROGRESS NOTES
[address] coding opportunities       Chart reviewed, no opportunity found: [address] Review     Patients Insurance     Medicare Insurance: [employer]

## 2023-08-15 ENCOUNTER — RA CDI HCC (OUTPATIENT)
Dept: OTHER | Facility: HOSPITAL | Age: 83
End: 2023-08-15

## 2023-08-15 NOTE — PROGRESS NOTES
[address] [facility] coding opportunities       Chart reviewed, no opportunity found: CHART REVIEWED, Deidra     Patients Insurance     Medicare Insurance: [employer]

## 2023-10-26 ENCOUNTER — HOSPITAL ENCOUNTER (OUTPATIENT)
Age: 83
Discharge: HOME | End: 2023-10-26
Payer: COMMERCIAL

## 2023-10-26 VITALS — WEIGHT: 193 LBS | HEIGHT: 62 IN | BODY MASS INDEX: 35.51 KG/M2

## 2023-10-26 DIAGNOSIS — Z12.31 SCREENING MAMMOGRAM FOR BREAST CANCER: ICD-10-CM

## 2023-10-26 PROCEDURE — 77063 BREAST TOMOSYNTHESIS BI: CPT

## 2023-10-26 PROCEDURE — 77067 SCR MAMMO BI INCL CAD: CPT

## 2024-01-05 ENCOUNTER — TELEPHONE (OUTPATIENT)
Dept: HEMATOLOGY ONCOLOGY | Facility: CLINIC | Age: 84
End: 2024-01-05

## 2024-01-05 ENCOUNTER — OFFICE VISIT (OUTPATIENT)
Dept: GYNECOLOGIC ONCOLOGY | Facility: CLINIC | Age: 84
End: 2024-01-05
Payer: COMMERCIAL

## 2024-01-05 VITALS
TEMPERATURE: 98.4 F | HEIGHT: 62 IN | SYSTOLIC BLOOD PRESSURE: 138 MMHG | HEART RATE: 81 BPM | WEIGHT: 193.5 LBS | DIASTOLIC BLOOD PRESSURE: 68 MMHG | OXYGEN SATURATION: 97 % | BODY MASS INDEX: 35.61 KG/M2

## 2024-01-05 DIAGNOSIS — Z85.42 HISTORY OF ENDOMETRIAL CANCER: Primary | ICD-10-CM

## 2024-01-05 DIAGNOSIS — C54.1 MALIGNANT NEOPLASM OF ENDOMETRIUM (HCC): ICD-10-CM

## 2024-01-05 DIAGNOSIS — Z12.31 SCREENING MAMMOGRAM FOR BREAST CANCER: ICD-10-CM

## 2024-01-05 DIAGNOSIS — N64.59 INVERSION OF RIGHT NIPPLE: ICD-10-CM

## 2024-01-05 PROCEDURE — 99214 OFFICE O/P EST MOD 30 MIN: CPT | Performed by: PHYSICIAN ASSISTANT

## 2024-01-05 RX ORDER — MIRABEGRON 50 MG/1
50 TABLET, FILM COATED, EXTENDED RELEASE ORAL DAILY
COMMUNITY
Start: 2023-12-01

## 2024-01-05 RX ORDER — AZITHROMYCIN 250 MG/1
TABLET, FILM COATED ORAL
COMMUNITY
Start: 2023-12-22

## 2024-01-05 RX ORDER — BENZONATATE 100 MG/1
100 CAPSULE ORAL
COMMUNITY
Start: 2023-12-22

## 2024-01-05 RX ORDER — GUAIFENESIN 600 MG/1
600 TABLET, EXTENDED RELEASE ORAL
COMMUNITY
Start: 2023-12-22

## 2024-01-05 NOTE — TELEPHONE ENCOUNTER
Appointment Confirmation   Who are you speaking with? Patient   If it is not the patient, are they listed on an active communication consent form? N/A   Which provider is the appointment scheduled with?  Naz Guardado PA-C   When is the appointment scheduled?  Please list date and time 1/5/24 1030   At which location is the appointment scheduled to take place? [facility]   Did caller verbalize understanding of appointment details? Yes

## 2024-01-05 NOTE — PROGRESS NOTES
Assessment/Plan:    Problem List Items Addressed This Visit          Genitourinary    Malignant neoplasm of endometrium (HCC)     As per below documentation.            Other    History of endometrial cancer - Primary     History of stage IB, grade 2 endometrial cancer with positive cytology. She is s/p completion of adjuvant chemotherapy and radiation in November 2015. She is clinically without evidence of disease recurrence.      Return to the office in 1 year for annual surveillance.     Script provided for screening mammogram due October 2024.         Inversion of right nipple     Intermittent with associated itching.   Plan for diagnostic right breast imaging.         Relevant Orders    Mammo diagnostic right w 3d & cad    US breast right limited (diagnostic)     Other Visit Diagnoses       Screening mammogram for breast cancer        Relevant Orders    Mammo screening bilateral w cad              CHIEF COMPLAINT:   Endometrial cancer surveillance    Problem:  Cancer Staging   History of endometrial cancer  Staging form: Corpus Uteri - Carcinoma, AJCC 8th Edition  - Clinical: FIGO Stage IB - Signed by Naz Guardado PA-C on 5/14/2018        Previous therapy:  Oncology History   History of endometrial cancer    Initial Diagnosis    Endometrial cancer (HCC)     7/6/2015 Surgery    Robotic-assisted total laparoscopic hysterectomy, bilateral salpingo-oophorectomy, pelvic and para-aortic lymph node dissections on   A. 1.3 out of 1.8 cm depth of invasion, negative nodes, no LVSI, positive peritoneal cytology      - 11/20/2015 Chemotherapy    Paclitaxel 175 mg/m2 and carboplatin AUC 6. She received six cycles.       - 11/2015 Radiation    Vaginal brachytherapy           Patient ID: Melinda Aguilera is a 83 y.o. female  who has no new complaints today. No vaginal bleeding, abdominal/pelvic pain. Normal bowel and bladder function. The patient notes intermittent right nipple inversion x 1 month. Also, there is itching of  "her right breast over her nipple. She denies rash. Quality of life is good.        The following portions of the patient's history were reviewed and updated as appropriate: allergies, current medications, past medical history, past surgical history, and problem list.    Review of Systems   Constitutional: Negative.    HENT: Negative.     Eyes: Negative.    Respiratory: Negative.     Cardiovascular: Negative.    Gastrointestinal: Negative.    Genitourinary: Negative.    Musculoskeletal: Negative.    Skin: Negative.    Neurological: Negative.    Psychiatric/Behavioral: Negative.         Current Outpatient Medications   Medication Sig Dispense Refill    aspirin 81 mg chewable tablet Chew 81 mg daily      calcium carbonate-vitamin D (OSCAL-D) 500 mg-200 units per tablet Take 1 tablet by mouth 2 (two) times a day with meals        Cyanocobalamin (VITAMIN B-12) 1000 MCG/15ML LIQD Take 4 tablets by mouth daily        dorzolamide-timolol (COSOPT) 22.3-6.8 MG/ML ophthalmic solution       latanoprost (XALATAN) 0.005 % ophthalmic solution Apply 1 drop to eye      Multiple Vitamins-Minerals (MULTI-B-PLUS) TABS Take by mouth      TURMERIC PO Take 500 Units by mouth daily      azithromycin (ZITHROMAX) 250 mg tablet Take two tablets by mouth on first day, then 1 tablet daily until gone      benzonatate (TESSALON PERLES) 100 mg capsule Take 100 mg by mouth      guaiFENesin (MUCINEX) 600 mg 12 hr tablet Take 600 mg by mouth      Myrbetriq 50 MG TB24 Take 50 mg by mouth daily      tacrolimus (PROTOPIC) 0.1 % ointment   (Patient not taking: Reported on 8/22/2022)       No current facility-administered medications for this visit.           Objective:    Blood pressure 138/68, pulse 81, temperature 98.4 °F (36.9 °C), height 5' 2\" (1.575 m), weight 87.8 kg (193 lb 8 oz), SpO2 97%.  Body mass index is 35.39 kg/m².  Body surface area is 1.89 meters squared.    Physical Exam  Vitals reviewed. Exam conducted with a chaperone present. "   Constitutional:       General: She is not in acute distress.     Appearance: Normal appearance. She is not ill-appearing.   HENT:      Head: Normocephalic and atraumatic.      Mouth/Throat:      Mouth: Mucous membranes are moist.   Eyes:      General:         Right eye: No discharge.         Left eye: No discharge.      Conjunctiva/sclera: Conjunctivae normal.   Pulmonary:      Effort: Pulmonary effort is normal.   Abdominal:      Palpations: Abdomen is soft. There is no mass.      Tenderness: There is no abdominal tenderness.      Hernia: No hernia is present.   Genitourinary:     Comments: The external female genitalia is normal. The bartholin's, uretheral and skenes glands are normal. The urethral meatus is normal (midline with no lesions). Anus without fissure or lesion. Speculum exam reveals a grossly normal vagina. No masses, lesions,discharge or bleeding. No significant cystocele or rectocele noted. Bimanual exam notes a surgical absent cervix, uterus and adnexal structures. No masses or fullness. Bladder is without fullness, mass or tenderness.  Musculoskeletal:      Right lower leg: No edema.      Left lower leg: No edema.   Skin:     General: Skin is warm and dry.      Coloration: Skin is not jaundiced.      Findings: No rash.   Neurological:      General: No focal deficit present.      Mental Status: She is alert and oriented to person, place, and time.      Cranial Nerves: No cranial nerve deficit.      Sensory: No sensory deficit.      Motor: No weakness.      Gait: Gait normal.   Psychiatric:         Mood and Affect: Mood normal.         Behavior: Behavior normal.         Thought Content: Thought content normal.         Judgment: Judgment normal.

## 2024-01-05 NOTE — ASSESSMENT & PLAN NOTE
History of stage IB, grade 2 endometrial cancer with positive cytology. She is s/p completion of adjuvant chemotherapy and radiation in November 2015. She is clinically without evidence of disease recurrence.      Return to the office in 1 year for annual surveillance.     Script provided for screening mammogram due October 2024.

## 2024-01-12 ENCOUNTER — TELEPHONE (OUTPATIENT)
Dept: MAMMOGRAPHY | Facility: CLINIC | Age: 84
End: 2024-01-12

## 2024-01-25 ENCOUNTER — TELEPHONE (OUTPATIENT)
Dept: MAMMOGRAPHY | Facility: CLINIC | Age: 84
End: 2024-01-25

## 2024-01-31 ENCOUNTER — TELEPHONE (OUTPATIENT)
Dept: MAMMOGRAPHY | Facility: CLINIC | Age: 84
End: 2024-01-31

## 2024-02-21 PROBLEM — Z00.00 HEALTH CARE MAINTENANCE: Status: RESOLVED | Noted: 2019-01-16 | Resolved: 2024-02-21

## 2024-03-13 ENCOUNTER — HOSPITAL ENCOUNTER (OUTPATIENT)
Dept: MAMMOGRAPHY | Facility: CLINIC | Age: 84
Discharge: HOME | End: 2024-03-13
Payer: COMMERCIAL

## 2024-03-13 ENCOUNTER — HOSPITAL ENCOUNTER (OUTPATIENT)
Dept: ULTRASOUND IMAGING | Facility: CLINIC | Age: 84
Discharge: HOME | End: 2024-03-13
Payer: COMMERCIAL

## 2024-03-13 DIAGNOSIS — N64.59 INVERSION OF RIGHT NIPPLE: ICD-10-CM

## 2024-03-13 PROCEDURE — 77065 DX MAMMO INCL CAD UNI: CPT

## 2024-03-13 PROCEDURE — G0279 TOMOSYNTHESIS, MAMMO: HCPCS

## 2024-03-13 PROCEDURE — 76642 ULTRASOUND BREAST LIMITED: CPT

## 2024-03-13 NOTE — PROGRESS NOTES
Met with patient and Dr. Kocher regarding recommendation for;    ___X__ RIGHT ______LEFT      ___X__Ultrasound guided  ______Stereotactic breast biopsy.      __X___Verbalized understanding.      Blood thinners:  No: _____ Yes: ___X___ What: aspirin 81 mg                Biopsy teaching sheet given:  Yes: ___X___ No: ________    Pt given contact information and adv to call with any questions/needs    Patient advised to arrive at 1330 for a 1400 appointment

## 2024-03-21 ENCOUNTER — HOSPITAL ENCOUNTER (OUTPATIENT)
Dept: ULTRASOUND IMAGING | Facility: CLINIC | Age: 84
End: 2024-03-21
Payer: COMMERCIAL

## 2024-03-21 ENCOUNTER — HOSPITAL ENCOUNTER (OUTPATIENT)
Dept: MAMMOGRAPHY | Facility: CLINIC | Age: 84
Discharge: HOME | End: 2024-03-21
Payer: COMMERCIAL

## 2024-03-21 VITALS — SYSTOLIC BLOOD PRESSURE: 131 MMHG | DIASTOLIC BLOOD PRESSURE: 72 MMHG | HEART RATE: 69 BPM

## 2024-03-21 DIAGNOSIS — R92.8 ABNORMAL MAMMOGRAM: ICD-10-CM

## 2024-03-21 DIAGNOSIS — R92.8 ABNORMAL FINDINGS ON DIAGNOSTIC IMAGING OF BREAST: ICD-10-CM

## 2024-03-21 PROCEDURE — 19084 BX BREAST ADD LESION US IMAG: CPT

## 2024-03-21 PROCEDURE — 88305 TISSUE EXAM BY PATHOLOGIST: CPT | Performed by: PATHOLOGY

## 2024-03-21 PROCEDURE — 19083 BX BREAST 1ST LESION US IMAG: CPT

## 2024-03-21 PROCEDURE — A4648 IMPLANTABLE TISSUE MARKER: HCPCS

## 2024-03-21 RX ORDER — LIDOCAINE HYDROCHLORIDE 10 MG/ML
5 INJECTION, SOLUTION EPIDURAL; INFILTRATION; INTRACAUDAL; PERINEURAL ONCE
Status: COMPLETED | OUTPATIENT
Start: 2024-03-21 | End: 2024-03-21

## 2024-03-21 RX ADMIN — LIDOCAINE HYDROCHLORIDE 5 ML: 10 INJECTION, SOLUTION EPIDURAL; INFILTRATION; INTRACAUDAL; PERINEURAL at 14:01

## 2024-03-21 RX ADMIN — LIDOCAINE HYDROCHLORIDE 5 ML: 10 INJECTION, SOLUTION EPIDURAL; INFILTRATION; INTRACAUDAL; PERINEURAL at 14:04

## 2024-03-21 NOTE — PROGRESS NOTES
Procedure type:    __x___ultrasound guided _____stereotactic    Breast:    _____Left __x___Right    Location: 9 o'clock abhay    Needle: 12 gauge kandy    # of passes: 3    Clip: butterfly hydromark    Performed by: Dr. Ole Holm    Pressure held for 5 minutes by: Anu Gu    Sterneeraj Strips:    ___X__yes _____no    Band aid:    __X___yes_____no    Tolerated procedure:    __X___yes _____no

## 2024-03-21 NOTE — PROGRESS NOTES
Procedure type:    ___x__ultrasound guided _____stereotactic    Breast:    _____Left ____x_Right    Location: 11 o'clock abhay    Needle: 12 gauge kandy    # of passes: 3    Clip: cylinder securemark    Performed by: Dr. Ole Holm    Pressure held for 5 minutes by: Anu Gu    Sterneeraj Strips:    ___X__yes _____no    Band aid:    __X___yes_____no    Tolerated procedure:    __X___yes _____no

## 2024-03-22 PROCEDURE — 88305 TISSUE EXAM BY PATHOLOGIST: CPT | Performed by: PATHOLOGY

## 2024-03-23 ENCOUNTER — TELEPHONE (OUTPATIENT)
Dept: MAMMOGRAPHY | Facility: CLINIC | Age: 84
End: 2024-03-23

## 2024-03-23 NOTE — PROGRESS NOTES
Post procedure call completed    Bleeding: _____yes __X___no (pt denies)    Pain: _____yes ___X___no (pt denies, used ice, no need for OTC pain meds)    Redness/Swelling: ______yes ___X___no (pt denies)    Band aid removed: _____yes ___X__no (discussed removing because showered, discussed increased chance of infection if she didn't remove wet band aid, pt states understanding)    Steri-Strips intact: ___X___yes _____no (discussed with patient to remove steri strips on Tuesday if they have not come off on their own)    Pt with no questions at this time, adv to call with any questions or concerns, has name/# for contact

## 2024-08-27 ENCOUNTER — TELEPHONE (OUTPATIENT)
Age: 84
End: 2024-08-27

## 2024-08-27 NOTE — TELEPHONE ENCOUNTER
Caller: patient    Doctor: n/a    Reason for call: pt called asking who was the doctor that did her TKA, the name was provided to the patient    Call back#: [phone]

## 2024-08-28 ENCOUNTER — TELEPHONE (OUTPATIENT)
Age: 84
End: 2024-08-28

## 2024-08-28 DIAGNOSIS — M25.511 ACUTE PAIN OF RIGHT SHOULDER: Primary | ICD-10-CM

## 2024-08-28 NOTE — TELEPHONE ENCOUNTER
Hello,    Please advise if a forced appointment can be accommodated for the patient:    Call back #: [phone]    Insurance: [employer]    Reason for appointment: RIGHT humeral head impaction fracture. DOI 7/29. Pain 8-9/10. Unable to sleep. Taking Tylenol/Ibuprofen not helping    Requested doctor and/or location: any/[place]      Thank you.

## 2024-08-29 ENCOUNTER — OFFICE VISIT (OUTPATIENT)
Dept: OBGYN CLINIC | Facility: CLINIC | Age: 84
End: 2024-08-29
Payer: COMMERCIAL

## 2024-08-29 ENCOUNTER — APPOINTMENT (OUTPATIENT)
Dept: RADIOLOGY | Facility: CLINIC | Age: 84
End: 2024-08-29
Payer: COMMERCIAL

## 2024-08-29 VITALS
SYSTOLIC BLOOD PRESSURE: 124 MMHG | HEART RATE: 83 BPM | BODY MASS INDEX: 35.04 KG/M2 | OXYGEN SATURATION: 95 % | HEIGHT: 62 IN | DIASTOLIC BLOOD PRESSURE: 82 MMHG | WEIGHT: 190.4 LBS

## 2024-08-29 DIAGNOSIS — M25.511 ACUTE PAIN OF RIGHT SHOULDER: ICD-10-CM

## 2024-08-29 DIAGNOSIS — S46.001A INJURY OF RIGHT ROTATOR CUFF, INITIAL ENCOUNTER: Primary | ICD-10-CM

## 2024-08-29 PROCEDURE — 20610 DRAIN/INJ JOINT/BURSA W/O US: CPT | Performed by: ORTHOPAEDIC SURGERY

## 2024-08-29 PROCEDURE — 73030 X-RAY EXAM OF SHOULDER: CPT

## 2024-08-29 PROCEDURE — 99203 OFFICE O/P NEW LOW 30 MIN: CPT | Performed by: ORTHOPAEDIC SURGERY

## 2024-08-29 RX ORDER — BUPIVACAINE HYDROCHLORIDE 2.5 MG/ML
2 INJECTION, SOLUTION INFILTRATION; PERINEURAL
Status: COMPLETED | OUTPATIENT
Start: 2024-08-29 | End: 2024-08-29

## 2024-08-29 RX ORDER — TRIAMCINOLONE ACETONIDE 40 MG/ML
80 INJECTION, SUSPENSION INTRA-ARTICULAR; INTRAMUSCULAR
Status: COMPLETED | OUTPATIENT
Start: 2024-08-29 | End: 2024-08-29

## 2024-08-29 RX ADMIN — BUPIVACAINE HYDROCHLORIDE 2 ML: 2.5 INJECTION, SOLUTION INFILTRATION; PERINEURAL at 14:00

## 2024-08-29 RX ADMIN — TRIAMCINOLONE ACETONIDE 80 MG: 40 INJECTION, SUSPENSION INTRA-ARTICULAR; INTRAMUSCULAR at 14:00

## 2024-08-29 NOTE — PROGRESS NOTES
Orthopaedics Office Visit - New Patient Visit    ASSESSMENT/PLAN:    Assessment:   Right rotator cuff injury, July 2024    Plan:   Discussed diagnosis of rotator cuff tear  Recommend physical therapy to improve ROM and strength. Referral provided today  Will consider MRI of the right shoulder if pain and lack of ROM persists past 8 weeks of PT  Continue taking OTC analgesics and NSAIDs  Offered CSI of right shoulder today. Tolerated procedure well.  Follow-up in 8 weeks    To Do Next Visit:  Re-evaluation of current issue and consider MRI of right shoulder    _____________________________________________________  CHIEF COMPLAINT:  Chief Complaint   Patient presents with   • Right Shoulder - Pain     Fell in July, has been having pain since July. Was seen by  orthopedics, but was not happy with the outcome .  XR taken on 8/21 with  ortho. Images uploaded into system.          SUBJECTIVE:  Melinda Aguilera is a 83 y.o. female who presents for initial evaluation of her right shoulder. She fell while walking her dog back in July. She does not recall falling directly on the right shoulder. Her dog did not pull on the leash at the time of the fall. She indicates her right shoulder pain is located anteriorly. She has difficulty raising the right arm. She notes increased pain at night. She has tried Tramadol, Ibuprofen, and Tylenol with temporary relief.     PAST MEDICAL HISTORY:  Past Medical History:   Diagnosis Date   • Arthritis    • Asthma    • Benign colon polyp    • Endometrial cancer (HCC) 2015   • Esophagitis, reflux    • Hemorrhoids, internal    • Sebaceous gland disease        PAST SURGICAL HISTORY:  Past Surgical History:   Procedure Laterality Date   • APPENDECTOMY     • CATARACT EXTRACTION Right 2021   • HAND FRACTURE REPAIR      Open Treatment of Fracture of one Metacarpal Bone   • HYSTERECTOMY     • KNEE ARTHROSCOPY      Therapeutic   • LYMPH NODE BIOPSY     • LYMPHADENECTOMY      staging   • LYMPHADENECTOMY       Staging   • OOPHORECTOMY     • PELVIC LAPAROSCOPY     • IN ARTHRP KNE CONDYLE&PLATU MEDIAL&LAT COMPARTMENTS Left 2/18/2019    Procedure: ARTHROPLASTY KNEE TOTAL;  Surgeon: Joana Wasserman MD;  Location: [facility];  Service: Orthopedics   • ROBOTIC ASSISTED HYSTERECTOMY     • SALPINGOOPHORECTOMY Bilateral    • TUBAL LIGATION     • US GUIDANCE BREAST BIOPSY RIGHT EACH ADDITIONAL Right 3/21/2024   • US GUIDED BREAST BIOPSY RIGHT COMPLETE Right 3/21/2024   • WRIST SURGERY      open treatment of fracture of one metacarpal bone       FAMILY HISTORY:  Family History   Problem Relation Age of Onset   • Cancer Mother         unknown   • Breast cancer Mother 35   • Cancer Father    • Stomach cancer Father    • No Known Problems Daughter    • No Known Problems Maternal Grandmother    • No Known Problems Paternal Grandmother    • No Known Problems Maternal Aunt    • No Known Problems Maternal Aunt    • No Known Problems Maternal Aunt    • No Known Problems Paternal Aunt    • No Known Problems Paternal Aunt        SOCIAL HISTORY:  Social History     Tobacco Use   • Smoking status: Never   • Smokeless tobacco: Never   Vaping Use   • Vaping status: Never Used   Substance Use Topics   • Alcohol use: Not Currently   • Drug use: No       MEDICATIONS:    Current Outpatient Medications:   •  Cyanocobalamin (VITAMIN B-12) 1000 MCG/15ML LIQD, Take 4 tablets by mouth daily  , Disp: , Rfl:   •  dorzolamide-timolol (COSOPT) 22.3-6.8 MG/ML ophthalmic solution, , Disp: , Rfl:   •  latanoprost (XALATAN) 0.005 % ophthalmic solution, Apply 1 drop to eye, Disp: , Rfl:   •  Multiple Vitamins-Minerals (MULTI-B-PLUS) TABS, Take by mouth, Disp: , Rfl:   •  Myrbetriq 50 MG TB24, Take 50 mg by mouth daily, Disp: , Rfl:   •  TURMERIC PO, Take 500 Units by mouth daily, Disp: , Rfl:   •  aspirin 81 mg chewable tablet, Chew 81 mg daily (Patient not taking: Reported on 8/29/2024), Disp: , Rfl:   •  azithromycin (ZITHROMAX) 250 mg tablet, Take two  "tablets by mouth on first day, then 1 tablet daily until gone (Patient not taking: Reported on 8/29/2024), Disp: , Rfl:   •  benzonatate (TESSALON PERLES) 100 mg capsule, Take 100 mg by mouth (Patient not taking: Reported on 8/29/2024), Disp: , Rfl:   •  calcium carbonate-vitamin D (OSCAL-D) 500 mg-200 units per tablet, Take 1 tablet by mouth 2 (two) times a day with meals   (Patient not taking: Reported on 8/29/2024), Disp: , Rfl:   •  guaiFENesin (MUCINEX) 600 mg 12 hr tablet, Take 600 mg by mouth (Patient not taking: Reported on 8/29/2024), Disp: , Rfl:   •  tacrolimus (PROTOPIC) 0.1 % ointment,   (Patient not taking: Reported on 8/22/2022), Disp: , Rfl:     ALLERGIES:  No Known Allergies    REVIEW OF SYSTEMS:  MSK: see physical exam  Neuro: WNL  Pertinent items are otherwise noted in HPI.  A comprehensive review of systems was otherwise negative.    LABS:  HgA1c:   Lab Results   Component Value Date    HGBA1C 5.8 (H) 05/24/2024     BMP:   Lab Results   Component Value Date    GLUCOSE 108 07/07/2015    CALCIUM 9.0 05/24/2024     07/07/2015    K 4.3 05/24/2024    CO2 25 05/24/2024     05/24/2024    BUN 15 05/24/2024    CREATININE 0.7 05/24/2024     CBC: No components found for: \"CBC\"    _____________________________________________________  PHYSICAL EXAMINATION:  Vital signs: /82   Pulse 83   Ht 5' 2\" (1.575 m)   Wt 86.4 kg (190 lb 6.4 oz)   SpO2 95%   BMI 34.82 kg/m²   General: No acute distress, awake and alert  Psychiatric: Mood and affect appear appropriate  HEENT: Trachea Midline, No torticollis, no apparent facial trauma  Cardiovascular: No audible murmurs; Extremities appear perfused  Pulmonary: No audible wheezing or stridor  Skin: No open lesions; see further details (if any) below    MUSCULOSKELETAL EXAMINATION:  Extremities:  Right Shoulder  No gross deformity  ROM: FF 50°, ER 30°, IR R hip  ER 3/5, IR 4+/5, Empty Can 3/5, Belly Press 4+/5  SILT  NV distally " "intact    _____________________________________________________  STUDIES REVIEWED:  I personally reviewed the images obtained in office today and my independent interpretation is as follows:  XR right shoulder: mild glenohumeral osteoarthritis; no acute fractures or dislocations    PROCEDURES PERFORMED:  Large joint arthrocentesis: R subacromial bursa  Universal Protocol:  Consent: Verbal consent obtained.  Risks and benefits: risks, benefits and alternatives were discussed  Consent given by: patient  Time out: Immediately prior to procedure a \"time out\" was called to verify the correct patient, procedure, equipment, support staff and site/side marked as required.  Patient understanding: patient states understanding of the procedure being performed  Site marked: the operative site was marked  Patient identity confirmed: verbally with patient  Supporting Documentation  Indications: pain   Procedure Details  Location: shoulder - R subacromial bursa  Preparation: Patient was prepped and draped in the usual sterile fashion  Needle size: 22 G  Approach: posterolateral  Medications administered: 2 mL bupivacaine 0.25 %; 80 mg triamcinolone acetonide 40 mg/mL    Patient tolerance: patient tolerated the procedure well with no immediate complications  Dressing:  Sterile dressing applied          Scribe Attestation    I,:  Freida Paredes am acting as a scribe while in the presence of the attending physician.:       I,:  Josiah Elmore MD personally performed the services described in this documentation    as scribed in my presence.:          "

## 2024-09-17 ENCOUNTER — EVALUATION (OUTPATIENT)
Dept: PHYSICAL THERAPY | Facility: CLINIC | Age: 84
End: 2024-09-17
Payer: COMMERCIAL

## 2024-09-17 DIAGNOSIS — S46.001A INJURY OF RIGHT ROTATOR CUFF, INITIAL ENCOUNTER: Primary | ICD-10-CM

## 2024-09-17 DIAGNOSIS — M25.511 ACUTE PAIN OF RIGHT SHOULDER: ICD-10-CM

## 2024-09-17 PROCEDURE — 97161 PT EVAL LOW COMPLEX 20 MIN: CPT

## 2024-09-17 PROCEDURE — 97110 THERAPEUTIC EXERCISES: CPT

## 2024-09-17 NOTE — PROGRESS NOTES
PT Evaluation     Today's date: 2024  Patient name: Melinda Aguilera  : 1940  MRN: [id number]  Referring provider: Josiah Elmore MD  Dx:   Encounter Diagnosis     ICD-10-CM    1. Injury of right rotator cuff, initial encounter  S46.001A Ambulatory Referral to Physical Therapy      2. Acute pain of right shoulder  M25.511 Ambulatory Referral to Physical Therapy          Start Time: 1215  Stop Time: 1300  Total time in clinic (min): 45 minutes    Assessment  Impairments: abnormal or restricted ROM, activity intolerance, impaired balance, impaired physical strength, lacks appropriate home exercise program, pain with function, safety issue, poor posture , poor body mechanics, participation limitations, activity limitations and endurance  Symptom irritability: high    Assessment details: Patient is a pleasant 83 y.o. female who presents to physical therapy with main reports of acute R shoulder pain following a fall. Pt reports that she falls frequently due to her peripheral neuropathy.   No further referral appears necessary at this time based upon examination results.    Primary movement impairment diagnosis of decreased AROM in R shoulder limited by pain resulting in pathoanatomical symptoms of decreased ROM, decreased strength, and increased symptom irritability. Possible rotator cuff involvement present following the fall, refer for imaging following one month of PT if there are no positive results. This limits Melinda's ability to reach overhead or lift any heavy objects w/ RUE.     Prognosis is good given HEP compliance and PT 2 times per week over the next 12 weeks.  Positive prognostic indicators include positive attitude toward recovery.  Negative prognostic indicators include high symptom irritability levels.   Please contact me if you have any questions.  Thank you for the opportunity to share in Melinda Aguilera care.    Understanding of Dx/Px/POC: good     Prognosis: fair    Goals  Short term:  Pt  will be independent w/ individualized HEP  Pt will have a decrease in symptom irritability by 2 points     Long term:  Pt will be able to reach overhead wo symptom irritability   Pt will be able to lift 5 pounds overhead w/o symptom irritability   Pt will be able to lift 15 pounds from waste to chest w/o symptom irritability.   Pt will improve FOTO by [facility]      Plan  Patient would benefit from: skilled physical therapy  Referral necessary: No    Planned therapy interventions: IASTM, joint mobilization, manual therapy, massage, nerve gliding, neuromuscular re-education, patient/caregiver education, postural training, strengthening, stretching, therapeutic activities, therapeutic exercise, home exercise program, abdominal trunk stabilization, activity modification, body mechanics training, flexibility and functional ROM exercises    Frequency: 2x week  Plan of Care beginning date: 9/17/2024  Plan of Care expiration date: 12/10/2024  Treatment plan discussed with: patient        Subjective Evaluation    History of Present Illness  Mechanism of injury: Pt reports to outpatient PT for acute shoulder pain that started following a fall in July. Pt experienced a fall in July, 10 days later the R shoulder became extremely painful. Pt was unable to lift her arm. Pt unsure about the mechanism of injury as she did not fall on the shoulder.   Pt received an injection into the R shoulder which helped decrease some of the symptom irritability. Pt reports her pain is the worst at night.  Pt reports that she does not experience any radicular symptoms in the RUE. Pt had x-rays and did not show any dislocation in the R shoulder.    Pt utilizes a cane when in the community, can not utilize it currently in RUE  No red flags, history of cancer and fall risk          Not a recurrent problem   Quality of life: good    Patient Goals  Patient goals for therapy: decreased pain, increased motion, increased strength and independence with  ADLs/IADLs    Pain  Current pain ratin  At best pain ratin  At worst pain ratin  Location: Anterior aspect of shoulder  Quality: sharp and dull ache  Relieving factors: change in position, relaxation and medications  Aggravating factors: lifting and overhead activity  Progression: improved    Social Support  Lives with: adult children and spouse    Employment status: not working  Hand dominance: right    Treatments  Previous treatment: injection treatment        Objective     Postural Observations  Seated posture: poor  Standing posture: poor      Palpation     Right   Tenderness of the anterior deltoid, biceps, middle deltoid and upper trapezius.     Cervical/Thoracic Screen   Cervical range of motion within normal limits  Thoracic range of motion within normal limits    Neurological Testing     Sensation     Shoulder   Left Shoulder   Intact: light touch    Right Shoulder   Intact: Light touch    Active Range of Motion   Left Shoulder   Normal active range of motion    Right Shoulder   Flexion: 120 degrees with pain  Extension: WFL and with pain  Abduction: 85 degrees with pain  Adduction: WFL  External rotation 0°: WFL and with pain  External rotation 45°: WFL and with pain  Internal rotation 0°: WFL and with pain  Internal rotation 45°: WFL and with pain    Passive Range of Motion   Left Shoulder   Normal passive range of motion    Right Shoulder   Flexion: 160 degrees with pain  Extension: WFL and with pain  Abduction: 100 degrees with pain  External rotation 0°: WFL and with pain  External rotation 45°: WFL and with pain  External rotation 90°: WFL and with pain  Internal rotation 0°: WFL and with pain  Internal rotation 45°: WFL and with pain  Internal rotation 90°: WFL and with pain    Strength/Myotome Testing     Left Shoulder     Planes of Motion   Flexion: 4   Extension: 4   Abduction: 4   Adduction: WFL   External rotation at 0°: 4+   Internal rotation at 0°: 4+     Isolated Muscles   Biceps:  4+   Lower trapezius: 4   Middle trapezius: 4   Rhomboids: 4   Triceps: 5   Upper trapezius: 5     Right Shoulder     Planes of Motion   Flexion: 3   Extension: 3+   Abduction: 3   Adduction: WFL   External rotation at 0°: 4-   External rotation at 45°: 4-   External rotation at 90°: 3+   Internal rotation at 0°: 4-   Internal rotation at 45°: 4-   Internal rotation at 90°: 3+     Isolated Muscles   Biceps: 3+   Lower trapezius: 3+   Middle trapezius: 3+   Rhomboids: 3+   Triceps: 4+   Upper trapezius: 4     Tests     Right Shoulder   Positive belly press, Hawkin's, lift-off and painful arc.   Negative active compression ([place]), apprehension, clunk, drop arm, external rotation lag sign, sulcus sign and bicep load test positive.              Precautions: Fall Risk    POC expires Unit limit Auth Expiration date PT/OT/ST + Visit Limit?   12/10/24 BOMN Pend BOMN                           Visit/Unit Tracking  AUTH Status:  Date 9/17              Pend Used 1               Remaining                  HEP: [id number]   Manuals 9/17                                                                Neuro Re-Ed                                                                                                        Ther Ex             HEP education 8'                                                                                                       Ther Activity                                       Gait Training                                       Modalities

## 2024-09-19 ENCOUNTER — OFFICE VISIT (OUTPATIENT)
Dept: PHYSICAL THERAPY | Facility: CLINIC | Age: 84
End: 2024-09-19
Payer: COMMERCIAL

## 2024-09-19 DIAGNOSIS — S46.001A INJURY OF RIGHT ROTATOR CUFF, INITIAL ENCOUNTER: Primary | ICD-10-CM

## 2024-09-19 DIAGNOSIS — M25.511 ACUTE PAIN OF RIGHT SHOULDER: ICD-10-CM

## 2024-09-19 PROCEDURE — 97110 THERAPEUTIC EXERCISES: CPT

## 2024-09-19 PROCEDURE — 97112 NEUROMUSCULAR REEDUCATION: CPT

## 2024-09-19 NOTE — PROGRESS NOTES
"Daily Note     Today's date: 2024  Patient name: Melinda Aguliera  : 1940  MRN: [id number]  Referring provider: Josiah Elmore MD  Dx:   Encounter Diagnosis     ICD-10-CM    1. Injury of right rotator cuff, initial encounter  S46.001A       2. Acute pain of right shoulder  M25.511         Start Time: 0304  Stop Time: 344  Total time in clinic (min): 40 minutes    Subjective: Pt reports that she has been doing her HEP and currently has no problems or questions.       Objective: See treatment diary below      Assessment: Tolerated treatment well. Pt had full PROM w/ pain at end range flexion and shoulder abduction. Initiated strengthening program, Pt required frequent cueing for exercises. Pt had difficulty balancing while completing the parascapular exercises. Pt had difficulty w/ ER compared to IR. Patient demonstrated fatigue post treatment, exhibited good technique with therapeutic exercises, and would benefit from continued PT for increased ROM, increased mobility, and decreased symptom irritability.       Plan: Continue per plan of care.  Progress treatment as tolerated.       Precautions: Fall Risk    POC expires Unit limit Auth Expiration date PT/OT/ST + Visit Limit?   12/10/24 BOMN 24 BOMN                           Visit/Unit Tracking  AUTH Status:  Date              18 Used 1 2              Remaining                  HEP: [id number]   Manuals                                                                Neuro Re-Ed             TB row  BLTB 2x15           TB shoulder extensions  BLTB 2x15           TB robberies  BTB 2x15           TB ER/IR  RTB 2x10                                                  Ther Ex             HEP education 8'            UE bike  3'/3'           R shoulder PROM  JMK           Elda's  5'           Thoracic extensions  3\" x30           Active shoulder flexion  2x10                                     Ther Activity                                     "   Gait Training                                       Modalities

## 2024-09-24 ENCOUNTER — OFFICE VISIT (OUTPATIENT)
Dept: PHYSICAL THERAPY | Facility: CLINIC | Age: 84
End: 2024-09-24
Payer: COMMERCIAL

## 2024-09-24 DIAGNOSIS — S46.001A INJURY OF RIGHT ROTATOR CUFF, INITIAL ENCOUNTER: Primary | ICD-10-CM

## 2024-09-24 DIAGNOSIS — M25.511 ACUTE PAIN OF RIGHT SHOULDER: ICD-10-CM

## 2024-09-24 PROCEDURE — 97112 NEUROMUSCULAR REEDUCATION: CPT

## 2024-09-24 PROCEDURE — 97110 THERAPEUTIC EXERCISES: CPT

## 2024-09-24 NOTE — PROGRESS NOTES
Daily Note     Today's date: 2024  Patient name: Melinda Aguilera  : 1940  MRN: [id number]  Referring provider: Josiah Elmore MD  Dx:   Encounter Diagnosis     ICD-10-CM    1. Injury of right rotator cuff, initial encounter  S46.001A       2. Acute pain of right shoulder  M25.511           Start Time: 845  Stop Time: 927  Total time in clinic (min): 42 minutes    Subjective: Pt reports that she felt much better following last session. Pt reports her pain has decreased and her active motion has improved. Pt also reports she has been sleeping better.       Objective: See treatment diary below      Assessment: Tolerated treatment well. Pt still has the most symptom irritability w/ passive and active abduction. Once the Pt gets above 90 degrees abduction her pain begins. Pt has not pain w/ IR/ER and end range pain w/ flexion. Performed parascapular exercises from the seated position due to pt's balance. Patient demonstrated fatigue post treatment, exhibited good technique with therapeutic exercises, and would benefit from continued PT for increased ROM, increased mobility, and decreased symptom irritability.       Plan: Continue per plan of care.  Progress treatment as tolerated.       Precautions: Fall Risk    POC expires Unit limit Auth Expiration date PT/OT/ST + Visit Limit?   12/10/24 BOMN 24 BOMN                           Visit/Unit Tracking  AUTH Status:  Date             18 Used 1 2 3             Remaining                  HEP: [id number]   Manuals                                                               Neuro Re-Ed             TB row  BLTB 2x15 BLTB 2x15          TB shoulder extensions  BLTB 2x15 BTB 2x15          TB robberies  BTB 2x15 GTB 2x15          TB ER/IR  RTB 2x10                                                  Ther Ex             HEP education 8'            UE bike  3'/3' 3'/3'          R shoulder PROM  JMK CATRACHITAK          Pulley's  5' 5'         "  Thoracic extensions  3\" x30 3\" x30          Active shoulder flexion  2x10 2x10 1#          Active shoulder abduction   2x10                        Ther Activity                                       Gait Training                                       Modalities                                            "

## 2024-09-26 ENCOUNTER — OFFICE VISIT (OUTPATIENT)
Dept: PHYSICAL THERAPY | Facility: CLINIC | Age: 84
End: 2024-09-26
Payer: COMMERCIAL

## 2024-09-26 DIAGNOSIS — S46.001A INJURY OF RIGHT ROTATOR CUFF, INITIAL ENCOUNTER: Primary | ICD-10-CM

## 2024-09-26 DIAGNOSIS — M25.511 ACUTE PAIN OF RIGHT SHOULDER: ICD-10-CM

## 2024-09-26 PROCEDURE — 97112 NEUROMUSCULAR REEDUCATION: CPT | Performed by: PHYSICAL THERAPIST

## 2024-09-26 PROCEDURE — 97110 THERAPEUTIC EXERCISES: CPT | Performed by: PHYSICAL THERAPIST

## 2024-09-26 NOTE — PROGRESS NOTES
"Daily Note     Today's date: 2024  Patient name: Melinda Aguilera  : 1940  MRN: [id number]  Referring provider: Josiah Elmore MD  Dx:   Encounter Diagnosis     ICD-10-CM    1. Injury of right rotator cuff, initial encounter  S46.001A       2. Acute pain of right shoulder  M25.511                        Subjective: Patient states improved overhead reaching ability with her right UE.      Objective: See treatment diary below      Assessment: Patient demonstrated moderate pain with manual PROM; required verbal cueing for proper technique with robbery exercise.       Plan: Continue per plan of care.  Progress treatment as tolerated.       Precautions: Fall Risk    POC expires Unit limit Auth Expiration date PT/OT/ST + Visit Limit?   12/10/24 BOMN 24 BOMN                           Visit/Unit Tracking  AUTH Status:  Date            18 Used 1 2 3 4            Remaining                  HEP: [id number]   Manuals                                                              Neuro Re-Ed             TB row  BLTB 2x15 BLTB 2x15 BTB 2x15         TB shoulder extensions  BLTB 2x15 BTB 2x15 BTB 2x15         TB robberies  BTB 2x15 GTB 2x15 GTB 2x15         TB ER/IR  RTB 2x10                                                  Ther Ex             HEP education 8'            UE bike  3'/3' 3'/3' 3'/3'         R shoulder PROM  JMK JMK KK         Pulley's  5' 5' 5'         Thoracic extensions  3\" x30 3\" x30 3\" x30         Active shoulder flexion  2x10 2x10 1# 1# 2x10         Active shoulder abduction   2x10  2x10                      Ther Activity                                       Gait Training                                       Modalities                                            "

## 2024-10-01 ENCOUNTER — OFFICE VISIT (OUTPATIENT)
Dept: PHYSICAL THERAPY | Facility: CLINIC | Age: 84
End: 2024-10-01
Payer: COMMERCIAL

## 2024-10-01 DIAGNOSIS — S46.001A INJURY OF RIGHT ROTATOR CUFF, INITIAL ENCOUNTER: Primary | ICD-10-CM

## 2024-10-01 DIAGNOSIS — M25.511 ACUTE PAIN OF RIGHT SHOULDER: ICD-10-CM

## 2024-10-01 PROCEDURE — 97112 NEUROMUSCULAR REEDUCATION: CPT | Performed by: PHYSICAL THERAPIST

## 2024-10-01 PROCEDURE — 97110 THERAPEUTIC EXERCISES: CPT | Performed by: PHYSICAL THERAPIST

## 2024-10-01 NOTE — PROGRESS NOTES
"Daily Note     Today's date: 10/1/2024  Patient name: Melinda Aguilera  : 1940  MRN: [id number]  Referring provider: Josiah Elmore MD  Dx:   Encounter Diagnosis     ICD-10-CM    1. Injury of right rotator cuff, initial encounter  S46.001A       2. Acute pain of right shoulder  M25.511                        Subjective: Patient states difficulty with AROM shoulder extension.       Objective: See treatment diary below      Assessment: Patient demonstrated moderate difficulty with addition of wand extension; moderate pain with manual shoulder PROM.       Plan: Continue per plan of care.  Progress treatment as tolerated.       Precautions: Fall Risk    POC expires Unit limit Auth Expiration date PT/OT/ST + Visit Limit?   12/10/24 BOMN 24 BOMN                           Visit/Unit Tracking  AUTH Status:  Date 9/17 9/19 9/23 9/26 10/1          18 Used 1 2 3 4 5           Remaining                  HEP: [id number]   Manuals 9/17 9/19 9/23 9/26 10/1                                                            Neuro Re-Ed             TB row  BLTB 2x15 BLTB 2x15 BTB 2x15 BTB 2x15        TB shoulder extensions  BLTB 2x15 BTB 2x15 BTB 2x15 BTB 2x15        TB robberies  BTB 2x15 GTB 2x15 GTB 2x15 GTB 2x15        TB ER/IR  RTB 2x10                                                  Ther Ex             HEP education 8'            UE bike  3'/3' 3'/3' 3'/3' 3'/3'        R shoulder PROM  JMK JMK KK KK        Pulley's  5' 5' 5' 5'        Thoracic extensions  3\" x30 3\" x30 3\" x30 3\" x30        Active shoulder flexion  2x10 2x10 1# 1# 2x10 1# 2x10        Active shoulder abduction   2x10  2x10 2x10        Wand shoulder ext     2x10        Ther Activity                                       Gait Training                                       Modalities                                            "

## 2024-10-03 ENCOUNTER — OFFICE VISIT (OUTPATIENT)
Dept: PHYSICAL THERAPY | Facility: CLINIC | Age: 84
End: 2024-10-03
Payer: COMMERCIAL

## 2024-10-03 DIAGNOSIS — S46.001A INJURY OF RIGHT ROTATOR CUFF, INITIAL ENCOUNTER: Primary | ICD-10-CM

## 2024-10-03 DIAGNOSIS — M25.511 ACUTE PAIN OF RIGHT SHOULDER: ICD-10-CM

## 2024-10-03 PROCEDURE — 97140 MANUAL THERAPY 1/> REGIONS: CPT

## 2024-10-03 PROCEDURE — 97110 THERAPEUTIC EXERCISES: CPT

## 2024-10-03 PROCEDURE — 97112 NEUROMUSCULAR REEDUCATION: CPT

## 2024-10-03 NOTE — PROGRESS NOTES
"Daily Note     Today's date: 10/3/2024  Patient name: Melinda Aguilera  : 1940  MRN: [id number]  Referring provider: Josiah Elmore MD  Dx:   Encounter Diagnosis     ICD-10-CM    1. Injury of right rotator cuff, initial encounter  S46.001A       2. Acute pain of right shoulder  M25.511         Start Time: 1100  Stop Time: 1138  Total time in clinic (min): 38 minutes    Subjective: Patient reports she only has pain with active shoulder extension, no pain w/ flexion or abduction.       Objective: See treatment diary below      Assessment: Patient tolerated treatment well. Educated Pt on benefits of applying heat the affect shoulder rather than Ice following PT. Added GH mobs to promote active extension, Pt reported pain during manuals, but reported minimal to no pain w/ active extension afterwards. Pt did well w/ prone exercises, need cueing for proper para scapular contraction. Pt would benefit from Continued PT for increased strength and decreased symptom irritability.        Plan: Continue per plan of care.  Progress treatment as tolerated.       Precautions: Fall Risk    POC expires Unit limit Auth Expiration date PT/OT/ST + Visit Limit?   12/10/24 BOMN 24 BOMN                           Visit/Unit Tracking  AUTH Status:  Date 9/17 9/19 9/23 9/26 10/1 10/3         18 Used 1 2 3 4 5 6          Remaining                  HEP: [id number]   Manuals 9/17 9/19 9/23 9/26 10/1 10/3       GH mob G1-2      JMK P to A       R shoulder PROM      JMK                                 Neuro Re-Ed             TB row  BLTB 2x15 BLTB 2x15 BTB 2x15 BTB 2x15 12# 2x15 [place]       TB shoulder extensions  BLTB 2x15 BTB 2x15 BTB 2x15 BTB 2x15 8# 2x15 [place]       TB robberies  BTB 2x15 GTB 2x15 GTB 2x15 GTB 2x15        TB ER/IR  RTB 2x10           Prone shoulder extension      2x10 3\"        Prone Y      2x10 ea                    Ther Ex             HEP education 8'            UE bike  3'/3' 3'/3' 3'/3' 3'/3' 3'/3'       R " "shoulder PROM  JMK JMK KK KK        Pulley's  5' 5' 5' 5' 5'       Thoracic extensions  3\" x30 3\" x30 3\" x30 3\" x30        Active shoulder flexion  2x10 2x10 1# 1# 2x10 1# 2x10        Active shoulder abduction   2x10  2x10 2x10        Wand shoulder ext     2x10        Triceps extension renetta       2x10 10#       Ther Activity                                       Gait Training                                       Modalities                                            "

## 2024-10-08 ENCOUNTER — OFFICE VISIT (OUTPATIENT)
Dept: OBGYN CLINIC | Facility: CLINIC | Age: 84
End: 2024-10-08
Payer: COMMERCIAL

## 2024-10-08 VITALS
WEIGHT: 192.4 LBS | HEIGHT: 62 IN | BODY MASS INDEX: 35.41 KG/M2 | SYSTOLIC BLOOD PRESSURE: 105 MMHG | HEART RATE: 76 BPM | DIASTOLIC BLOOD PRESSURE: 70 MMHG

## 2024-10-08 DIAGNOSIS — S46.001A INJURY OF RIGHT ROTATOR CUFF, INITIAL ENCOUNTER: Primary | ICD-10-CM

## 2024-10-08 PROCEDURE — 99213 OFFICE O/P EST LOW 20 MIN: CPT | Performed by: ORTHOPAEDIC SURGERY

## 2024-10-08 NOTE — PROGRESS NOTES
Orthopaedics Office Visit - Established Patient Visit    ASSESSMENT/PLAN:    Assessment:   Right rotator cuff injury, July 2024   Significantly improved after SA injection and PT    Plan:   Overall Melinda is doing well  We discussed the CSI may be repeated on a 3 month basis as needed  Continue PT exercises at home  Follow up in the office as needed if symptoms worsen or fail to improve     To Do Next Visit:  PRN    _____________________________________________________  CHIEF COMPLAINT:  Chief Complaint   Patient presents with    Right Shoulder - Follow-up         SUBJECTIVE:  Melinda Aguilera is a 83 y.o. female who presents to the office for a follow up regarding her right shoulder. She underwent a right shoulder subacromial bursa CSI on 8/29/24, which was beneficial for her. She has been attending PT with improvement in her motion. She notes difficulty reaching behind her back. She is no longer having to take anything for pain control.       PAST MEDICAL HISTORY:  Past Medical History:   Diagnosis Date    Arthritis     Asthma     Benign colon polyp     Endometrial cancer (HCC) 2015    Esophagitis, reflux     Hemorrhoids, internal     Sebaceous gland disease        PAST SURGICAL HISTORY:  Past Surgical History:   Procedure Laterality Date    APPENDECTOMY      CATARACT EXTRACTION Right 2021    HAND FRACTURE REPAIR      Open Treatment of Fracture of one Metacarpal Bone    HYSTERECTOMY      KNEE ARTHROSCOPY      Therapeutic    LYMPH NODE BIOPSY      LYMPHADENECTOMY      staging    LYMPHADENECTOMY      Staging    OOPHORECTOMY      PELVIC LAPAROSCOPY      WY ARTHRP KNE CONDYLE&PLATU MEDIAL&LAT COMPARTMENTS Left 2/18/2019    Procedure: ARTHROPLASTY KNEE TOTAL;  Surgeon: Joana Wasserman MD;  Location: [facility];  Service: Orthopedics    ROBOTIC ASSISTED HYSTERECTOMY      SALPINGOOPHORECTOMY Bilateral     TUBAL LIGATION      US GUIDANCE BREAST BIOPSY RIGHT EACH ADDITIONAL Right 3/21/2024    US GUIDED BREAST BIOPSY RIGHT  COMPLETE Right 3/21/2024    WRIST SURGERY      open treatment of fracture of one metacarpal bone       FAMILY HISTORY:  Family History   Problem Relation Age of Onset    Cancer Mother         unknown    Breast cancer Mother 35    Cancer Father     Stomach cancer Father     No Known Problems Daughter     No Known Problems Maternal Grandmother     No Known Problems Paternal Grandmother     No Known Problems Maternal Aunt     No Known Problems Maternal Aunt     No Known Problems Maternal Aunt     No Known Problems Paternal Aunt     No Known Problems Paternal Aunt        SOCIAL HISTORY:  Social History     Tobacco Use    Smoking status: Never    Smokeless tobacco: Never   Vaping Use    Vaping status: Never Used   Substance Use Topics    Alcohol use: Not Currently    Drug use: No       MEDICATIONS:    Current Outpatient Medications:     Cyanocobalamin (VITAMIN B-12) 1000 MCG/15ML LIQD, Take 4 tablets by mouth daily  , Disp: , Rfl:     dorzolamide-timolol (COSOPT) 22.3-6.8 MG/ML ophthalmic solution, , Disp: , Rfl:     latanoprost (XALATAN) 0.005 % ophthalmic solution, Apply 1 drop to eye, Disp: , Rfl:     Multiple Vitamins-Minerals (MULTI-B-PLUS) TABS, Take by mouth, Disp: , Rfl:     Myrbetriq 50 MG TB24, Take 50 mg by mouth daily, Disp: , Rfl:     aspirin 81 mg chewable tablet, Chew 81 mg daily (Patient not taking: Reported on 8/29/2024), Disp: , Rfl:     azithromycin (ZITHROMAX) 250 mg tablet, Take two tablets by mouth on first day, then 1 tablet daily until gone (Patient not taking: Reported on 8/29/2024), Disp: , Rfl:     benzonatate (TESSALON PERLES) 100 mg capsule, Take 100 mg by mouth (Patient not taking: Reported on 8/29/2024), Disp: , Rfl:     calcium carbonate-vitamin D (OSCAL-D) 500 mg-200 units per tablet, Take 1 tablet by mouth 2 (two) times a day with meals   (Patient not taking: Reported on 8/29/2024), Disp: , Rfl:     guaiFENesin (MUCINEX) 600 mg 12 hr tablet, Take 600 mg by mouth (Patient not taking:  "Reported on 8/29/2024), Disp: , Rfl:     tacrolimus (PROTOPIC) 0.1 % ointment,   (Patient not taking: Reported on 8/22/2022), Disp: , Rfl:     TURMERIC PO, Take 500 Units by mouth daily, Disp: , Rfl:     ALLERGIES:  No Known Allergies    LABS:  HgA1c:   Lab Results   Component Value Date    HGBA1C 5.8 (H) 05/24/2024     BMP:   Lab Results   Component Value Date    GLUCOSE 108 07/07/2015    CALCIUM 9.0 05/24/2024     07/07/2015    K 4.3 05/24/2024    CO2 25 05/24/2024     05/24/2024    BUN 15 05/24/2024    CREATININE 0.7 05/24/2024     CBC: No components found for: \"CBC\"    _____________________________________________________  PHYSICAL EXAMINATION:  Vital signs: /70   Pulse 76   Ht 5' 2\" (1.575 m)   Wt 87.3 kg (192 lb 6.4 oz)   BMI 35.19 kg/m²   General: No acute distress, awake and alert  Psychiatric: Mood and affect appear appropriate  HEENT: Trachea Midline, No torticollis, no apparent facial trauma  Cardiovascular: No audible murmurs; Extremities appear perfused  Pulmonary: No audible wheezing or stridor  Skin: No open lesions; see further details (if any) below    MUSCULOSKELETAL EXAMINATION:    Extremities:  Right shoulder    No erythema, ecchymosis or edema   Forward elevation approx. 160 degrees  Full abduction, approx. 100 degrees  Limited internal rotation   External rotation approx. 50 degrees  No bony or soft tissue tenderness  Forward elevation strength 4+/5  Extremity appears warm and well perfused     _____________________________________________________  STUDIES REVIEWED:  none      PROCEDURES PERFORMED:  Procedures    Scribe Attestation      I,:  Fernanda Boyle MA am acting as a scribe while in the presence of the attending physician.:       I,:  Josiah Elmore MD personally performed the services described in this documentation    as scribed in my presence.:             "

## 2024-10-10 ENCOUNTER — APPOINTMENT (OUTPATIENT)
Dept: PHYSICAL THERAPY | Facility: CLINIC | Age: 84
End: 2024-10-10
Payer: COMMERCIAL

## 2024-12-10 ENCOUNTER — OFFICE VISIT (OUTPATIENT)
Dept: OBGYN CLINIC | Facility: CLINIC | Age: 84
End: 2024-12-10
Payer: COMMERCIAL

## 2024-12-10 VITALS
SYSTOLIC BLOOD PRESSURE: 104 MMHG | HEIGHT: 62 IN | DIASTOLIC BLOOD PRESSURE: 67 MMHG | WEIGHT: 194 LBS | HEART RATE: 80 BPM | BODY MASS INDEX: 35.7 KG/M2

## 2024-12-10 DIAGNOSIS — S46.209A INJURY OF TENDON OF BICEPS: ICD-10-CM

## 2024-12-10 DIAGNOSIS — S46.001A INJURY OF RIGHT ROTATOR CUFF, INITIAL ENCOUNTER: Primary | ICD-10-CM

## 2024-12-10 PROCEDURE — 99213 OFFICE O/P EST LOW 20 MIN: CPT | Performed by: ORTHOPAEDIC SURGERY

## 2024-12-10 PROCEDURE — 20610 DRAIN/INJ JOINT/BURSA W/O US: CPT | Performed by: ORTHOPAEDIC SURGERY

## 2024-12-10 RX ORDER — TRIAMCINOLONE ACETONIDE 40 MG/ML
20 INJECTION, SUSPENSION INTRA-ARTICULAR; INTRAMUSCULAR
Status: COMPLETED | OUTPATIENT
Start: 2024-12-10 | End: 2024-12-10

## 2024-12-10 RX ORDER — BUPIVACAINE HYDROCHLORIDE 2.5 MG/ML
2 INJECTION, SOLUTION INFILTRATION; PERINEURAL
Status: COMPLETED | OUTPATIENT
Start: 2024-12-10 | End: 2024-12-10

## 2024-12-10 RX ADMIN — BUPIVACAINE HYDROCHLORIDE 2 ML: 2.5 INJECTION, SOLUTION INFILTRATION; PERINEURAL at 09:15

## 2024-12-10 RX ADMIN — TRIAMCINOLONE ACETONIDE 20 MG: 40 INJECTION, SUSPENSION INTRA-ARTICULAR; INTRAMUSCULAR at 09:15

## 2024-12-10 NOTE — PROGRESS NOTES
Orthopaedics Office Visit - Established Patient Visit    ASSESSMENT/PLAN:    Assessment:   Right rotator cuff and biceps injury, July 2024   Arm with worsening function today, minimal  ability to forward flex to neutral      Plan:   X-rays reviewed and discussed with patient  Patient to be full weightbearing to RUE (Right Upper Extremity)  ROM as tolerated to  RUE (Right Upper Extremity)  Discussed with patient she should have her MRI study as scheduled in January. She was instructed to bring the disc and report following her MRI study for further review.   Patient to continue home exercise plan for strength and mobility training, exercises placed in after visit summary   Patient to continue at home analgesic regimen with Tylenol & Aleve as needed   Patient to follow up in approximately 5 weeks       To Do Next Visit:  Review MRI study     _____________________________________________________  CHIEF COMPLAINT:  Chief Complaint   Patient presents with    Right Shoulder - Follow-up         SUBJECTIVE:  Melinda Aguilera is a RHD 84 y.o. female who presents to the office for a follow up regarding her right shoulder rotator cuff injury status post corticosteroid injection on 8/29/2024. The injection provided temporary relief in symptoms. The patient attended physical therapy for a few months without improvement in symptoms. Pain is waking her up at night. She cannot lift her arm over her head without assistance. The patient denies any numbness, tingling, burning or cramping. Pain is managed with Aleve as needed.         PAST MEDICAL HISTORY:  Past Medical History:   Diagnosis Date    Arthritis     Asthma     Benign colon polyp     Endometrial cancer (HCC) 2015    Esophagitis, reflux     Hemorrhoids, internal     Sebaceous gland disease        PAST SURGICAL HISTORY:  Past Surgical History:   Procedure Laterality Date    APPENDECTOMY      CATARACT EXTRACTION Right 2021    HAND FRACTURE REPAIR      Open Treatment of Fracture of  one Metacarpal Bone    HYSTERECTOMY      KNEE ARTHROSCOPY      Therapeutic    LYMPH NODE BIOPSY      LYMPHADENECTOMY      staging    LYMPHADENECTOMY      Staging    OOPHORECTOMY      PELVIC LAPAROSCOPY      VT ARTHRP KNE CONDYLE&PLATU MEDIAL&LAT COMPARTMENTS Left 2/18/2019    Procedure: ARTHROPLASTY KNEE TOTAL;  Surgeon: Joana Wasserman MD;  Location: [facility];  Service: Orthopedics    ROBOTIC ASSISTED HYSTERECTOMY      SALPINGOOPHORECTOMY Bilateral     TUBAL LIGATION      US GUIDANCE BREAST BIOPSY RIGHT EACH ADDITIONAL Right 3/21/2024    US GUIDED BREAST BIOPSY RIGHT COMPLETE Right 3/21/2024    WRIST SURGERY      open treatment of fracture of one metacarpal bone       FAMILY HISTORY:  Family History   Problem Relation Age of Onset    Cancer Mother         unknown    Breast cancer Mother 35    Cancer Father     Stomach cancer Father     No Known Problems Daughter     No Known Problems Maternal Grandmother     No Known Problems Paternal Grandmother     No Known Problems Maternal Aunt     No Known Problems Maternal Aunt     No Known Problems Maternal Aunt     No Known Problems Paternal Aunt     No Known Problems Paternal Aunt        SOCIAL HISTORY:  Social History     Tobacco Use    Smoking status: Never    Smokeless tobacco: Never   Vaping Use    Vaping status: Never Used   Substance Use Topics    Alcohol use: Not Currently    Drug use: No       MEDICATIONS:    Current Outpatient Medications:     Cyanocobalamin (VITAMIN B-12) 1000 MCG/15ML LIQD, Take 4 tablets by mouth daily  , Disp: , Rfl:     dorzolamide-timolol (COSOPT) 22.3-6.8 MG/ML ophthalmic solution, , Disp: , Rfl:     latanoprost (XALATAN) 0.005 % ophthalmic solution, Apply 1 drop to eye, Disp: , Rfl:     Multiple Vitamins-Minerals (MULTI-B-PLUS) TABS, Take by mouth, Disp: , Rfl:     Myrbetriq 50 MG TB24, Take 50 mg by mouth daily, Disp: , Rfl:     aspirin 81 mg chewable tablet, Chew 81 mg daily (Patient not taking: Reported on 8/29/2024), Disp: , Rfl:     " azithromycin (ZITHROMAX) 250 mg tablet, Take two tablets by mouth on first day, then 1 tablet daily until gone (Patient not taking: Reported on 8/29/2024), Disp: , Rfl:     benzonatate (TESSALON PERLES) 100 mg capsule, Take 100 mg by mouth (Patient not taking: Reported on 8/29/2024), Disp: , Rfl:     calcium carbonate-vitamin D (OSCAL-D) 500 mg-200 units per tablet, Take 1 tablet by mouth 2 (two) times a day with meals   (Patient not taking: Reported on 8/29/2024), Disp: , Rfl:     guaiFENesin (MUCINEX) 600 mg 12 hr tablet, Take 600 mg by mouth (Patient not taking: Reported on 8/29/2024), Disp: , Rfl:     tacrolimus (PROTOPIC) 0.1 % ointment,   (Patient not taking: Reported on 8/22/2022), Disp: , Rfl:     TURMERIC PO, Take 500 Units by mouth daily, Disp: , Rfl:     ALLERGIES:  No Known Allergies    LABS:  HgA1c:   Lab Results   Component Value Date    HGBA1C 5.8 (H) 05/24/2024     BMP:   Lab Results   Component Value Date    GLUCOSE 108 07/07/2015    CALCIUM 9.0 05/24/2024     07/07/2015    K 4.3 05/24/2024    CO2 25 05/24/2024     05/24/2024    BUN 15 05/24/2024    CREATININE 0.7 05/24/2024     CBC: No components found for: \"CBC\"    _____________________________________________________  PHYSICAL EXAMINATION:  Vital signs: /67   Pulse 80   Ht 5' 2\" (1.575 m)   Wt 88 kg (194 lb)   BMI 35.48 kg/m²   General: No acute distress, awake and alert  Psychiatric: Mood and affect appear appropriate  HEENT: Trachea Midline, No torticollis, no apparent facial trauma  Cardiovascular: No audible murmurs; Extremities appear perfused  Pulmonary: No audible wheezing or stridor  Skin: No open lesions; see further details (if any) below    MUSCULOSKELETAL EXAMINATION:    Extremities:    Right Shoulder:   No anatomical deformity    There is tenderness present over the biceps, greater tuberosity .   Active range of motion   85 degrees forward flexion  Passive range of motion   100 degrees of forward flexion " "  There is 3/5 strength with supraspinatus testing.  There is 4/5 strength with infraspinatus testing.  There is 4/5 strength with subscapularis testing.  Perez test is positive  [place]on's test is positive   Speed's test is Positive  Fingers are pink and mobile  Compartments are soft  Brisk capillary refill  Sensation is intact   The patient is neurovascularly intact distally in the extremity.       _____________________________________________________  STUDIES REVIEWED:  none      PROCEDURES PERFORMED:  Large joint arthrocentesis: R subacromial bursa  Universal Protocol:  Consent: Verbal consent obtained.  Risks and benefits: risks, benefits and alternatives were discussed  Consent given by: patient  Time out: Immediately prior to procedure a \"time out\" was called to verify the correct patient, procedure, equipment, support staff and site/side marked as required.  Patient understanding: patient states understanding of the procedure being performed  Site marked: the operative site was marked  Patient identity confirmed: verbally with patient  Supporting Documentation  Indications: pain   Procedure Details  Location: shoulder - R subacromial bursa  Preparation: Patient was prepped and draped in the usual sterile fashion  Needle size: 22 G  Ultrasound guidance: no  Approach: posterolateral  Medications administered: 2 mL bupivacaine 0.25 %; 20 mg triamcinolone acetonide 40 mg/mL    Patient tolerance: patient tolerated the procedure well with no immediate complications  Dressing:  Sterile dressing applied          Scribe Attestation      I,:  Ness Turcios am acting as a scribe while in the presence of the attending physician.:       I,:  Josiah Elmore MD personally performed the services described in this documentation    as scribed in my presence.:             "

## 2024-12-10 NOTE — PATIENT INSTRUCTIONS
Home exercise plan                      Overhead pulley's   Hold for 20-30 seconds   Perform 5 times         Pull up bar or lat pull down hang  Attempt to lower your bottom to the bench below you while having hands overhead.  Hold for 20-30 seconds   Perform 5 times       Wall walks   Walk your fingers/hand up the wall and hold for 30 seconds at the top  3 x 10

## 2025-01-10 ENCOUNTER — OFFICE VISIT (OUTPATIENT)
Dept: GYNECOLOGIC ONCOLOGY | Facility: CLINIC | Age: 85
End: 2025-01-10
Payer: COMMERCIAL

## 2025-01-10 VITALS
OXYGEN SATURATION: 98 % | BODY MASS INDEX: 34.93 KG/M2 | DIASTOLIC BLOOD PRESSURE: 88 MMHG | HEART RATE: 94 BPM | SYSTOLIC BLOOD PRESSURE: 158 MMHG | TEMPERATURE: 97.6 F | WEIGHT: 191 LBS

## 2025-01-10 DIAGNOSIS — Z85.42 ENCOUNTER FOR FOLLOW-UP SURVEILLANCE OF ENDOMETRIAL CANCER: Primary | ICD-10-CM

## 2025-01-10 DIAGNOSIS — Z08 ENCOUNTER FOR FOLLOW-UP SURVEILLANCE OF ENDOMETRIAL CANCER: Primary | ICD-10-CM

## 2025-01-10 DIAGNOSIS — Z85.42 HISTORY OF ENDOMETRIAL CANCER: ICD-10-CM

## 2025-01-10 DIAGNOSIS — Z12.31 SCREENING MAMMOGRAM FOR BREAST CANCER: ICD-10-CM

## 2025-01-10 PROCEDURE — 99459 PELVIC EXAMINATION: CPT | Performed by: PHYSICIAN ASSISTANT

## 2025-01-10 PROCEDURE — 99213 OFFICE O/P EST LOW 20 MIN: CPT | Performed by: PHYSICIAN ASSISTANT

## 2025-01-10 RX ORDER — CARBOXYMETHYLCELLULOSE SODIUM 5 MG/ML
1 SOLUTION/ DROPS OPHTHALMIC 4 TIMES DAILY
COMMUNITY
Start: 2024-07-17

## 2025-01-10 RX ORDER — ERYTHROMYCIN 5 MG/G
OINTMENT OPHTHALMIC
COMMUNITY
Start: 2024-10-10

## 2025-01-10 NOTE — PROGRESS NOTES
Name: Melinda Aguilera      : 1940      MRN: [id number]  Encounter Provider: Naz Guardado PA-C  Encounter Date: 1/10/2025   Encounter department: [facility]  :  Assessment & Plan  Screening mammogram for breast cancer    Orders:  •  Mammo screening bilateral w 3d and cad; Future    Encounter for follow-up surveillance of endometrial cancer         History of endometrial cancer  History of stage IB, grade 2 endometrial cancer with positive cytology s/p completion of adjuvant chemotherapy and vaginal brachytherapy in 2015. She is clinically without evidence of disease recurrence.     Return to the office in 1 year for continued endometrial cancer surveillance.     Script provided for screening mammogram.               History of Present Illness     Reason for Visit / CC:  Survivorship / Surveillance Visit    Melinda Aguilera is a 84 y.o. female   who has no new complaints today. No vaginal bleeding, abdominal/pelvic pain. Normal bowel and bladder function. No interval change in medical history since last visit. Quality of life is good.         Oncology History   Cancer Staging   History of endometrial cancer  Staging form: Corpus Uteri - Carcinoma, AJCC 8th Edition  - Clinical: FIGO Stage IB - Signed by Naz Guardado PA-C on 2018  Histologic grade (G): G2  Histologic grading system: 3 grade system  Oncology History   History of endometrial cancer    Initial Diagnosis    Endometrial cancer (HCC)     2015 Surgery    Robotic-assisted total laparoscopic hysterectomy, bilateral salpingo-oophorectomy, pelvic and para-aortic lymph node dissections on   A. 1.3 out of 1.8 cm depth of invasion, negative nodes, no LVSI, positive peritoneal cytology      - 2015 Chemotherapy    Paclitaxel 175 mg/m2 and carboplatin AUC 6. She received six cycles.       - 2015 Radiation    Vaginal brachytherapy        Review of Systems   Constitutional: Negative.    HENT:  Negative.     Eyes: Negative.    Respiratory: Negative.     Cardiovascular: Negative.    Gastrointestinal: Negative.    Genitourinary: Negative.    Musculoskeletal: Negative.    Skin: Negative.    Neurological: Negative.    Psychiatric/Behavioral: Negative.      A complete review of systems is negative other than that noted above in the HPI.  Medical History Reviewed by provider this encounter:  Tobacco  Allergies  Meds  Problems  Med Hx  Surg Hx  Fam Hx     .  Current Outpatient Medications on File Prior to Visit   Medication Sig Dispense Refill   • carboxymethylcellulose (REFRESH PLUS) 0.5 % SOLN Apply 1 drop to eye 4 (four) times a day     • Cyanocobalamin (VITAMIN B-12) 1000 MCG/15ML LIQD Take 4 tablets by mouth daily       • dorzolamide-timolol (COSOPT) 22.3-6.8 MG/ML ophthalmic solution      • erythromycin (ILOTYCIN) ophthalmic ointment apply 0.25 inches in both eyelids at night once weekly, remove in morning     • latanoprost (XALATAN) 0.005 % ophthalmic solution Apply 1 drop to eye     • Multiple Vitamins-Minerals (MULTI-B-PLUS) TABS Take by mouth     • Myrbetriq 50 MG TB24 Take 50 mg by mouth daily     • aspirin 81 mg chewable tablet Chew 81 mg daily (Patient not taking: Reported on 8/29/2024)     • azithromycin (ZITHROMAX) 250 mg tablet Take two tablets by mouth on first day, then 1 tablet daily until gone (Patient not taking: Reported on 8/29/2024)     • benzonatate (TESSALON PERLES) 100 mg capsule Take 100 mg by mouth (Patient not taking: Reported on 8/29/2024)     • calcium carbonate-vitamin D (OSCAL-D) 500 mg-200 units per tablet Take 1 tablet by mouth 2 (two) times a day with meals   (Patient not taking: Reported on 8/29/2024)     • guaiFENesin (MUCINEX) 600 mg 12 hr tablet Take 600 mg by mouth (Patient not taking: Reported on 8/29/2024)     • tacrolimus (PROTOPIC) 0.1 % ointment   (Patient not taking: Reported on 8/22/2022)     • TURMERIC PO Take 500 Units by mouth daily       No current  facility-administered medications on file prior to visit.         Objective   /88 (BP Location: Left arm, Patient Position: Sitting, Cuff Size: Standard)   Pulse 94   Temp 97.6 °F (36.4 °C) (Temporal)   Wt 86.6 kg (191 lb)   SpO2 98%   BMI 34.93 kg/m²     Body mass index is 34.93 kg/m².  Pain Screening:  Pain Score: 0-No pain    Physical Exam  Vitals reviewed. Exam conducted with a chaperone present.   Constitutional:       General: She is not in acute distress.     Appearance: Normal appearance. She is not ill-appearing.   HENT:      Head: Normocephalic and atraumatic.      Mouth/Throat:      Mouth: Mucous membranes are moist.   Eyes:      General:         Right eye: No discharge.         Left eye: No discharge.      Conjunctiva/sclera: Conjunctivae normal.   Pulmonary:      Effort: Pulmonary effort is normal.   Abdominal:      Palpations: Abdomen is soft. There is no mass.      Tenderness: There is no abdominal tenderness.      Hernia: No hernia is present.   Genitourinary:     Comments: The external female genitalia is normal. The bartholin's, uretheral and skenes glands are normal. The urethral meatus is normal (midline with no lesions). Anus without fissure or lesion. Speculum exam reveals a grossly normal vagina. No masses, lesions,discharge or bleeding. No significant cystocele or rectocele noted. Bimanual exam notes a surgical absent cervix, uterus and adnexal structures. No masses or fullness. Bladder is without fullness, mass or tenderness.  Musculoskeletal:      Right lower leg: No edema.      Left lower leg: No edema.   Skin:     General: Skin is warm and dry.      Coloration: Skin is not jaundiced.      Findings: No rash.   Neurological:      General: No focal deficit present.      Mental Status: She is alert and oriented to person, place, and time.      Cranial Nerves: No cranial nerve deficit.      Sensory: No sensory deficit.      Motor: No weakness.      Gait: Gait normal.   Psychiatric:          Mood and Affect: Mood normal.         Behavior: Behavior normal.         Thought Content: Thought content normal.         Judgment: Judgment normal.          Radiology Results Review: I have reviewed radiology reports from 3/13-3/21/24 including: breast and f/u breast imaging.

## 2025-01-10 NOTE — ASSESSMENT & PLAN NOTE
History of stage IB, grade 2 endometrial cancer with positive cytology s/p completion of adjuvant chemotherapy and vaginal brachytherapy in November 2015. She is clinically without evidence of disease recurrence.     Return to the office in 1 year for continued endometrial cancer surveillance.     Script provided for screening mammogram.

## 2025-03-24 ENCOUNTER — HOSPITAL ENCOUNTER (OUTPATIENT)
Age: 85
Discharge: HOME | End: 2025-03-24
Payer: COMMERCIAL

## 2025-03-24 VITALS — WEIGHT: 191 LBS | HEIGHT: 62 IN | BODY MASS INDEX: 35.15 KG/M2

## 2025-03-24 DIAGNOSIS — Z12.31 SCREENING MAMMOGRAM FOR BREAST CANCER: ICD-10-CM

## 2025-03-24 PROCEDURE — 77067 SCR MAMMO BI INCL CAD: CPT

## 2025-03-24 PROCEDURE — 77063 BREAST TOMOSYNTHESIS BI: CPT

## 2025-03-31 ENCOUNTER — TELEPHONE (OUTPATIENT)
Dept: GYNECOLOGIC ONCOLOGY | Facility: CLINIC | Age: 85
End: 2025-03-31

## 2025-03-31 NOTE — TELEPHONE ENCOUNTER
Called and left a message that appointment was rescheduled.  Appointment was rescheduled to 2/6 at 10:40 am in [facility].  Patient needs to confirm.

## 2025-05-06 ENCOUNTER — OFFICE VISIT (OUTPATIENT)
Dept: OBGYN CLINIC | Facility: CLINIC | Age: 85
End: 2025-05-06
Payer: COMMERCIAL

## 2025-05-06 VITALS — WEIGHT: 198.4 LBS | BODY MASS INDEX: 36.51 KG/M2 | HEIGHT: 62 IN

## 2025-05-06 DIAGNOSIS — M12.811 ROTATOR CUFF TEAR ARTHROPATHY, RIGHT: Primary | ICD-10-CM

## 2025-05-06 DIAGNOSIS — M19.011 PRIMARY OSTEOARTHRITIS OF RIGHT SHOULDER: ICD-10-CM

## 2025-05-06 DIAGNOSIS — M75.101 ROTATOR CUFF TEAR ARTHROPATHY, RIGHT: Primary | ICD-10-CM

## 2025-05-06 PROCEDURE — 99214 OFFICE O/P EST MOD 30 MIN: CPT | Performed by: ORTHOPAEDIC SURGERY

## 2025-05-06 PROCEDURE — 20610 DRAIN/INJ JOINT/BURSA W/O US: CPT | Performed by: ORTHOPAEDIC SURGERY

## 2025-05-06 RX ORDER — BUPIVACAINE HYDROCHLORIDE 2.5 MG/ML
2 INJECTION, SOLUTION INFILTRATION; PERINEURAL
Status: COMPLETED | OUTPATIENT
Start: 2025-05-06 | End: 2025-05-06

## 2025-05-06 RX ORDER — METHYLPREDNISOLONE ACETATE 40 MG/ML
2 INJECTION, SUSPENSION INTRA-ARTICULAR; INTRALESIONAL; INTRAMUSCULAR; SOFT TISSUE
Status: COMPLETED | OUTPATIENT
Start: 2025-05-06 | End: 2025-05-06

## 2025-05-06 RX ADMIN — METHYLPREDNISOLONE ACETATE 2 ML: 40 INJECTION, SUSPENSION INTRA-ARTICULAR; INTRALESIONAL; INTRAMUSCULAR; SOFT TISSUE at 11:00

## 2025-05-06 RX ADMIN — BUPIVACAINE HYDROCHLORIDE 2 ML: 2.5 INJECTION, SOLUTION INFILTRATION; PERINEURAL at 11:00

## 2025-05-06 NOTE — PROGRESS NOTES
Orthopaedics Office Visit - Established Patient Visit    ASSESSMENT/PLAN:    Assessment:   Right rotator cuff and biceps injury, July 2024   Arm with worsening function today, minimal  ability to forward flex to neutral  Plan:   MRI results reviewed and discussed with patient and management plan developed based on results  Patient to be full weightbearing to RUE (Right Upper Extremity)  ROM as tolerated to  RUE (Right Upper Extremity)   Patient will treat non operatively at this point of time as she has relief from previous cortisone injections and formal PT. She was offered and accepted a sub acromial cortisone injection today, which she tolerated well.   Patient to continue at home analgesic regimen with Tylenol & Aleve as needed   Continue with home exercises   Patient to follow up in 3 months . If no relief will consider referral for surgical consult.     To Do Next Visit:  Review MRI study     _____________________________________________________  CHIEF COMPLAINT:  Chief Complaint   Patient presents with    Right Shoulder - Follow-up     SUBJECTIVE:  Melinda Aguilera is a RHD 84 y.o. female who presents to the office for a follow up regarding her right shoulder rotator cuff injury status post subacromial corticosteroid injection on 12/10/2024. The injection provided significant relief until 3 weeks ago. She attended physical therapy for a few months without improvement in symptoms. She cannot lift her arm over her head without assistance. The patient denies any numbness, tingling, burning or cramping. Pain is managed with Aleve as needed.       PAST MEDICAL HISTORY:  Past Medical History:   Diagnosis Date    Arthritis     Asthma     Benign colon polyp     Endometrial cancer (HCC) 2015    Esophagitis, reflux     Hemorrhoids, internal     Sebaceous gland disease        PAST SURGICAL HISTORY:  Past Surgical History:   Procedure Laterality Date    APPENDECTOMY      CATARACT EXTRACTION Right 2021    HAND FRACTURE REPAIR       Open Treatment of Fracture of one Metacarpal Bone    HYSTERECTOMY      KNEE ARTHROSCOPY      Therapeutic    LYMPH NODE BIOPSY      LYMPHADENECTOMY      staging    LYMPHADENECTOMY      Staging    OOPHORECTOMY      PELVIC LAPAROSCOPY      IA ARTHRP KNE CONDYLE&PLATU MEDIAL&LAT COMPARTMENTS Left 2/18/2019    Procedure: ARTHROPLASTY KNEE TOTAL;  Surgeon: Joana Wasserman MD;  Location: [facility];  Service: Orthopedics    ROBOTIC ASSISTED HYSTERECTOMY      SALPINGOOPHORECTOMY Bilateral     TUBAL LIGATION      US GUIDANCE BREAST BIOPSY RIGHT EACH ADDITIONAL Right 3/21/2024    US GUIDED BREAST BIOPSY RIGHT COMPLETE Right 3/21/2024    WRIST SURGERY      open treatment of fracture of one metacarpal bone     FAMILY HISTORY:  Family History   Problem Relation Age of Onset    Cancer Mother         unknown    Breast cancer Mother 35    Cancer Father     Stomach cancer Father     No Known Problems Daughter     No Known Problems Maternal Grandmother     No Known Problems Paternal Grandmother     No Known Problems Maternal Aunt     No Known Problems Maternal Aunt     No Known Problems Maternal Aunt     No Known Problems Paternal Aunt     No Known Problems Paternal Aunt      SOCIAL HISTORY:  Social History     Tobacco Use    Smoking status: Never    Smokeless tobacco: Never   Vaping Use    Vaping status: Never Used   Substance Use Topics    Alcohol use: Not Currently    Drug use: No       MEDICATIONS:    Current Outpatient Medications:     carboxymethylcellulose (REFRESH PLUS) 0.5 % SOLN, Apply 1 drop to eye 4 (four) times a day, Disp: , Rfl:     Cyanocobalamin (VITAMIN B-12) 1000 MCG/15ML LIQD, Take 4 tablets by mouth daily  , Disp: , Rfl:     dorzolamide-timolol (COSOPT) 22.3-6.8 MG/ML ophthalmic solution, , Disp: , Rfl:     erythromycin (ILOTYCIN) ophthalmic ointment, apply 0.25 inches in both eyelids at night once weekly, remove in morning, Disp: , Rfl:     latanoprost (XALATAN) 0.005 % ophthalmic solution, Apply 1 drop to  "eye, Disp: , Rfl:     Multiple Vitamins-Minerals (MULTI-B-PLUS) TABS, Take by mouth, Disp: , Rfl:     Myrbetriq 50 MG TB24, Take 50 mg by mouth daily, Disp: , Rfl:     aspirin 81 mg chewable tablet, Chew 81 mg daily (Patient not taking: Reported on 8/29/2024), Disp: , Rfl:     azithromycin (ZITHROMAX) 250 mg tablet, Take two tablets by mouth on first day, then 1 tablet daily until gone (Patient not taking: Reported on 8/29/2024), Disp: , Rfl:     benzonatate (TESSALON PERLES) 100 mg capsule, Take 100 mg by mouth (Patient not taking: Reported on 8/29/2024), Disp: , Rfl:     calcium carbonate-vitamin D (OSCAL-D) 500 mg-200 units per tablet, Take 1 tablet by mouth 2 (two) times a day with meals   (Patient not taking: Reported on 8/29/2024), Disp: , Rfl:     guaiFENesin (MUCINEX) 600 mg 12 hr tablet, Take 600 mg by mouth (Patient not taking: Reported on 8/29/2024), Disp: , Rfl:     tacrolimus (PROTOPIC) 0.1 % ointment,   (Patient not taking: Reported on 8/22/2022), Disp: , Rfl:     TURMERIC PO, Take 500 Units by mouth daily, Disp: , Rfl:     ALLERGIES:  No Known Allergies    LABS:  HgA1c:   Lab Results   Component Value Date    HGBA1C 5.8 (H) 05/24/2024     BMP:   Lab Results   Component Value Date    GLUCOSE 108 07/07/2015    CALCIUM 9.0 05/24/2024     07/07/2015    K 4.3 05/24/2024    CO2 25 05/24/2024     05/24/2024    BUN 15 05/24/2024    CREATININE 0.7 05/24/2024     CBC: No components found for: \"CBC\"    _____________________________________________________  PHYSICAL EXAMINATION:  Vital signs: Ht 5' 2\" (1.575 m)   Wt 90 kg (198 lb 6.4 oz)   BMI 36.29 kg/m²   General: No acute distress, awake and alert  Psychiatric: Mood and affect appear appropriate  HEENT: Trachea Midline, No torticollis, no apparent facial trauma  Cardiovascular: No audible murmurs; Extremities appear perfused  Pulmonary: No audible wheezing or stridor  Skin: No open lesions; see further details (if any) below    MUSCULOSKELETAL " "EXAMINATION:    Extremities:    Right Shoulder:   No anatomical deformity    There is tenderness present over the biceps, greater tuberosity .   Active range of motion   85 degrees forward flexion  Passive range of motion   100 degrees of forward flexion   There is 3/5 strength with supraspinatus testing.  There is 4/5 strength with infraspinatus testing.  There is 4/5 strength with subscapularis testing.  Perez test is positive  [place]'s test is positive   Speed's test is Positive  Fingers are pink and mobile  Compartments are soft  Brisk capillary refill  Sensation is intact   The patient is neurovascularly intact distally in the extremity.       _____________________________________________________  STUDIES REVIEWED:  MRI right shoulder dated 4/28/25 independently reviewed and demonstrates full thickness tear of supraspinatus tendon with retraction. Moderate tendinopathy of long biceps tendon. Degenerative treat of labrum with focal articular cartilage loss at the humeral head.       PROCEDURES PERFORMED:  Large joint arthrocentesis: R subacromial bursa    Performed by: Josiah Elmore MD  Authorized by: Josiah Elmore MD    Universal Protocol:  Consent: Verbal consent obtained.  Risks and benefits: risks, benefits and alternatives were discussed  Consent given by: patient  Time out: Immediately prior to procedure a \"time out\" was called to verify the correct patient, procedure, equipment, support staff and site/side marked as required.  Patient understanding: patient states understanding of the procedure being performed  Site marked: the operative site was marked  Supporting Documentation  Indications: pain     Is this a Visco injection? NoProcedure Details  Location: shoulder - R subacromial bursa  Preparation: Patient was prepped and draped in the usual sterile fashion  Needle size: 22 G  Ultrasound guidance: no  Approach: posterior  Medications administered: 2 mL bupivacaine 0.25 %; 2 mL " methylPREDNISolone acetate 40 mg/mL    Patient tolerance: patient tolerated the procedure well with no immediate complications  Dressing:  Sterile dressing applied          Scribe Attestation      I,:  Helen Catherine am acting as a scribe while in the presence of the attending physician.:       I,:  Josiah Elmore MD personally performed the services described in this documentation    as scribed in my presence.:

## (undated) DEVICE — BETHLEHEM UNIV TOTAL KNEE, KIT: Brand: CARDINAL HEALTH

## (undated) DEVICE — SUT ETHILON 3-0 FS-1 18 IN 663G

## (undated) DEVICE — SPONGE LAP 18 X 18 IN

## (undated) DEVICE — PLUMEPEN PRO 10FT

## (undated) DEVICE — DRESSING MEPILEX AG BORDER 4 X 8 IN

## (undated) DEVICE — SYRINGE 50ML LL

## (undated) DEVICE — PADDING CAST 4 IN  COTTON STRL

## (undated) DEVICE — SUT FIBERWIRE #2 1/2 CIRCLE T-5 38IN AR-7200

## (undated) DEVICE — OCCLUSIVE GAUZE STRIP,3% BISMUTH TRIBROMOPHENATE IN PETROLATUM BLEND: Brand: XEROFORM

## (undated) DEVICE — GLOVE SRG BIOGEL 9

## (undated) DEVICE — NEEDLE 18 G X 1 1/2 SAFETY

## (undated) DEVICE — COOL TEMP PAD

## (undated) DEVICE — DRAPE SHEET X-LG

## (undated) DEVICE — MAYO STAND COVER: Brand: CONVERTORS

## (undated) DEVICE — IMPERVIOUS STOCKINETTE: Brand: DEROYAL

## (undated) DEVICE — THE SIMPULSE SOLO SYSTEM WITH ULTREX RETRACTABLE SPLASH SHIELD TIP: Brand: SIMPULSE SOLO

## (undated) DEVICE — CAPIT KNEE ATTUNE FB CEMENT - DEPUY

## (undated) DEVICE — CEMENT MIXING TOWER

## (undated) DEVICE — BIPOLAR SEALER 23-113-1 AQM 2.3: Brand: AQUAMANTYS™

## (undated) DEVICE — ABDOMINAL PAD: Brand: DERMACEA

## (undated) DEVICE — GAUZE SPONGES,16 PLY: Brand: CURITY

## (undated) DEVICE — HOOD: Brand: FLYTE, SURGICOOL

## (undated) DEVICE — U-DRAPE: Brand: CONVERTORS

## (undated) DEVICE — SUT VICRYL 0 CT-1 27 IN J260H

## (undated) DEVICE — DUAL CUT SAGITTAL BLADE

## (undated) DEVICE — GLOVE INDICATOR PI UNDERGLOVE SZ 9 BLUE

## (undated) DEVICE — SUT VICRYL 2-0 SH 27 IN UNDYED J417H

## (undated) DEVICE — CHLORAPREP HI-LITE 26ML ORANGE

## (undated) DEVICE — 3M™ IOBAN™ 2 ANTIMICROBIAL INCISE DRAPE 6650EZ: Brand: IOBAN™ 2

## (undated) DEVICE — DRAPE EQUIPMENT RF WAND

## (undated) DEVICE — COBAN 4 IN STERILE

## (undated) DEVICE — LIGHT HANDLE COVER SLEEVE DISP BLUE STELLAR

## (undated) DEVICE — RECIPROCATING BLADE, DOUBLE SIDED (70.0 X 0.96 X 12.5MM)

## (undated) DEVICE — SUT ETHILON 3-0 PS-1 18 IN 1663H

## (undated) DEVICE — SCD SEQUENTIAL COMPRESSION COMFORT SLEEVE MEDIUM KNEE LENGTH: Brand: KENDALL SCD

## (undated) DEVICE — ASTOUND SURGICAL GOWN, XXX LARGE, X-LONG: Brand: CONVERTORS

## (undated) DEVICE — ACE WRAP 6 IN UNSTERILE

## (undated) DEVICE — INTENDED FOR TISSUE SEPARATION, AND OTHER PROCEDURES THAT REQUIRE A SHARP SURGICAL BLADE TO PUNCTURE OR CUT.: Brand: BARD-PARKER SAFETY BLADES SIZE 10, STERILE

## (undated) DEVICE — TRAY FOLEY 16FR URIMETER SURESTEP